# Patient Record
Sex: MALE | Race: WHITE | NOT HISPANIC OR LATINO | Employment: UNEMPLOYED | URBAN - METROPOLITAN AREA
[De-identification: names, ages, dates, MRNs, and addresses within clinical notes are randomized per-mention and may not be internally consistent; named-entity substitution may affect disease eponyms.]

---

## 2017-01-24 ENCOUNTER — HOSPITAL ENCOUNTER (INPATIENT)
Facility: HOSPITAL | Age: 47
LOS: 6 days | Discharge: HOME HEALTH | End: 2017-01-31
Attending: EMERGENCY MEDICINE | Admitting: FAMILY MEDICINE

## 2017-01-24 DIAGNOSIS — L03.211 FACIAL CELLULITIS: Primary | ICD-10-CM

## 2017-01-24 DIAGNOSIS — IMO0001 UNCONTROLLED TYPE 2 DIABETES MELLITUS WITHOUT COMPLICATION, WITHOUT LONG-TERM CURRENT USE OF INSULIN: ICD-10-CM

## 2017-01-24 LAB
ALBUMIN SERPL-MCNC: 3.9 G/DL (ref 3.5–5.2)
ALBUMIN/GLOB SERPL: 1 G/DL
ALP SERPL-CCNC: 108 U/L (ref 39–117)
ALT SERPL W P-5'-P-CCNC: 15 U/L (ref 1–41)
ANION GAP SERPL CALCULATED.3IONS-SCNC: 14.2 MMOL/L
AST SERPL-CCNC: 10 U/L (ref 1–40)
BASOPHILS # BLD AUTO: 0.04 10*3/MM3 (ref 0–0.2)
BASOPHILS NFR BLD AUTO: 0.2 % (ref 0–1.5)
BILIRUB SERPL-MCNC: 0.5 MG/DL (ref 0.1–1.2)
BUN BLD-MCNC: 10 MG/DL (ref 6–20)
BUN/CREAT SERPL: 11.4 (ref 7–25)
CALCIUM SPEC-SCNC: 9.9 MG/DL (ref 8.6–10.5)
CHLORIDE SERPL-SCNC: 95 MMOL/L (ref 98–107)
CO2 SERPL-SCNC: 28.8 MMOL/L (ref 22–29)
CREAT BLD-MCNC: 0.88 MG/DL (ref 0.76–1.27)
DEPRECATED RDW RBC AUTO: 42.9 FL (ref 37–54)
EOSINOPHIL # BLD AUTO: 0.16 10*3/MM3 (ref 0–0.7)
EOSINOPHIL NFR BLD AUTO: 0.9 % (ref 0.3–6.2)
ERYTHROCYTE [DISTWIDTH] IN BLOOD BY AUTOMATED COUNT: 13.1 % (ref 11.5–14.5)
GFR SERPL CREATININE-BSD FRML MDRD: 94 ML/MIN/1.73
GLOBULIN UR ELPH-MCNC: 3.8 GM/DL
GLUCOSE BLD-MCNC: 317 MG/DL (ref 65–99)
HCT VFR BLD AUTO: 48 % (ref 40.4–52.2)
HGB BLD-MCNC: 15.8 G/DL (ref 13.7–17.6)
IMM GRANULOCYTES # BLD: 0.06 10*3/MM3 (ref 0–0.03)
IMM GRANULOCYTES NFR BLD: 0.3 % (ref 0–0.5)
LYMPHOCYTES # BLD AUTO: 5.76 10*3/MM3 (ref 0.9–4.8)
LYMPHOCYTES NFR BLD AUTO: 31.5 % (ref 19.6–45.3)
MCH RBC QN AUTO: 29.6 PG (ref 27–32.7)
MCHC RBC AUTO-ENTMCNC: 32.9 G/DL (ref 32.6–36.4)
MCV RBC AUTO: 89.9 FL (ref 79.8–96.2)
MONOCYTES # BLD AUTO: 1.44 10*3/MM3 (ref 0.2–1.2)
MONOCYTES NFR BLD AUTO: 7.9 % (ref 5–12)
NEUTROPHILS # BLD AUTO: 10.82 10*3/MM3 (ref 1.9–8.1)
NEUTROPHILS NFR BLD AUTO: 59.2 % (ref 42.7–76)
PLATELET # BLD AUTO: 237 10*3/MM3 (ref 140–500)
PMV BLD AUTO: 11.4 FL (ref 6–12)
POTASSIUM BLD-SCNC: 4.8 MMOL/L (ref 3.5–5.2)
PROCALCITONIN SERPL-MCNC: 0.14 NG/ML (ref 0.1–0.25)
PROT SERPL-MCNC: 7.7 G/DL (ref 6–8.5)
RBC # BLD AUTO: 5.34 10*6/MM3 (ref 4.6–6)
SODIUM BLD-SCNC: 138 MMOL/L (ref 136–145)
WBC NRBC COR # BLD: 18.28 10*3/MM3 (ref 4.5–10.7)

## 2017-01-24 PROCEDURE — 83605 ASSAY OF LACTIC ACID: CPT | Performed by: PHYSICIAN ASSISTANT

## 2017-01-24 PROCEDURE — 36415 COLL VENOUS BLD VENIPUNCTURE: CPT | Performed by: FAMILY MEDICINE

## 2017-01-24 PROCEDURE — 84145 PROCALCITONIN (PCT): CPT | Performed by: PHYSICIAN ASSISTANT

## 2017-01-24 PROCEDURE — 83036 HEMOGLOBIN GLYCOSYLATED A1C: CPT | Performed by: FAMILY MEDICINE

## 2017-01-24 PROCEDURE — 25010000002 ONDANSETRON PER 1 MG: Performed by: PHYSICIAN ASSISTANT

## 2017-01-24 PROCEDURE — 25010000002 MORPHINE PER 10 MG: Performed by: EMERGENCY MEDICINE

## 2017-01-24 PROCEDURE — 80053 COMPREHEN METABOLIC PANEL: CPT | Performed by: EMERGENCY MEDICINE

## 2017-01-24 PROCEDURE — 85025 COMPLETE CBC W/AUTO DIFF WBC: CPT | Performed by: EMERGENCY MEDICINE

## 2017-01-24 PROCEDURE — 99284 EMERGENCY DEPT VISIT MOD MDM: CPT

## 2017-01-24 PROCEDURE — 25010000002 PIPERACILLIN SOD-TAZOBACTAM PER 1 G: Performed by: PHYSICIAN ASSISTANT

## 2017-01-24 PROCEDURE — 87040 BLOOD CULTURE FOR BACTERIA: CPT | Performed by: PHYSICIAN ASSISTANT

## 2017-01-24 RX ORDER — SODIUM CHLORIDE 0.9 % (FLUSH) 0.9 %
10 SYRINGE (ML) INJECTION AS NEEDED
Status: DISCONTINUED | OUTPATIENT
Start: 2017-01-24 | End: 2017-01-31 | Stop reason: HOSPADM

## 2017-01-24 RX ORDER — ONDANSETRON 2 MG/ML
4 INJECTION INTRAMUSCULAR; INTRAVENOUS ONCE
Status: COMPLETED | OUTPATIENT
Start: 2017-01-24 | End: 2017-01-24

## 2017-01-24 RX ADMIN — ONDANSETRON 4 MG: 2 INJECTION INTRAMUSCULAR; INTRAVENOUS at 23:42

## 2017-01-24 RX ADMIN — MORPHINE SULFATE 4 MG: 4 INJECTION, SOLUTION INTRAMUSCULAR; INTRAVENOUS at 23:41

## 2017-01-24 RX ADMIN — TAZOBACTAM SODIUM AND PIPERACILLIN SODIUM 4.5 G: 500; 4 INJECTION, SOLUTION INTRAVENOUS at 23:28

## 2017-01-25 ENCOUNTER — APPOINTMENT (OUTPATIENT)
Dept: GENERAL RADIOLOGY | Facility: HOSPITAL | Age: 47
End: 2017-01-25
Attending: FAMILY MEDICINE

## 2017-01-25 PROBLEM — L03.211 FACIAL CELLULITIS: Status: ACTIVE | Noted: 2017-01-25

## 2017-01-25 LAB
25(OH)D3 SERPL-MCNC: 17.9 NG/ML (ref 30–100)
ABO GROUP BLD: NORMAL
ALBUMIN SERPL-MCNC: 3.9 G/DL (ref 3.5–5.2)
ALBUMIN/GLOB SERPL: 1.1 G/DL
ALP SERPL-CCNC: 106 U/L (ref 39–117)
ALT SERPL W P-5'-P-CCNC: 12 U/L (ref 1–41)
AMPHET+METHAMPHET UR QL: NEGATIVE
ANION GAP SERPL CALCULATED.3IONS-SCNC: 12.4 MMOL/L
APTT PPP: 26.6 SECONDS (ref 22.7–35.4)
ARTERIAL PATENCY WRIST A: POSITIVE
AST SERPL-CCNC: 14 U/L (ref 1–40)
ATMOSPHERIC PRESS: 740.6 MMHG
BARBITURATES UR QL SCN: NEGATIVE
BASE EXCESS BLDA CALC-SCNC: 1.1 MMOL/L (ref 0–2)
BASOPHILS # BLD AUTO: 0.05 10*3/MM3 (ref 0–0.2)
BASOPHILS NFR BLD AUTO: 0.3 % (ref 0–1.5)
BDY SITE: ABNORMAL
BENZODIAZ UR QL SCN: NEGATIVE
BILIRUB SERPL-MCNC: 0.6 MG/DL (ref 0.1–1.2)
BILIRUB UR QL STRIP: NEGATIVE
BLD GP AB SCN SERPL QL: NEGATIVE
BUN BLD-MCNC: 7 MG/DL (ref 6–20)
BUN/CREAT SERPL: 8.6 (ref 7–25)
CALCIUM SPEC-SCNC: 9.3 MG/DL (ref 8.6–10.5)
CANNABINOIDS SERPL QL: NEGATIVE
CHLORIDE SERPL-SCNC: 94 MMOL/L (ref 98–107)
CHOLEST SERPL-MCNC: 149 MG/DL (ref 0–200)
CLARITY UR: CLEAR
CO2 SERPL-SCNC: 28.6 MMOL/L (ref 22–29)
COCAINE UR QL: NEGATIVE
COLOR UR: YELLOW
CREAT BLD-MCNC: 0.81 MG/DL (ref 0.76–1.27)
CRP SERPL-MCNC: 5 MG/DL (ref 0–0.5)
D DIMER PPP FEU-MCNC: 0.42 MCGFEU/ML (ref 0–0.49)
D-LACTATE SERPL-SCNC: 1.2 MMOL/L (ref 0.5–2)
D-LACTATE SERPL-SCNC: 1.3 MMOL/L (ref 0.5–2)
DEPRECATED RDW RBC AUTO: 41.3 FL (ref 37–54)
EOSINOPHIL # BLD AUTO: 0.21 10*3/MM3 (ref 0–0.7)
EOSINOPHIL NFR BLD AUTO: 1.1 % (ref 0.3–6.2)
ERYTHROCYTE [DISTWIDTH] IN BLOOD BY AUTOMATED COUNT: 12.9 % (ref 11.5–14.5)
ERYTHROCYTE [SEDIMENTATION RATE] IN BLOOD: 42 MM/HR (ref 0–15)
FIBRINOGEN PPP-MCNC: 652 MG/DL (ref 219–464)
GFR SERPL CREATININE-BSD FRML MDRD: 104 ML/MIN/1.73
GLOBULIN UR ELPH-MCNC: 3.6 GM/DL
GLUCOSE BLD-MCNC: 308 MG/DL (ref 65–99)
GLUCOSE BLDC GLUCOMTR-MCNC: 224 MG/DL (ref 70–130)
GLUCOSE BLDC GLUCOMTR-MCNC: 235 MG/DL (ref 70–130)
GLUCOSE BLDC GLUCOMTR-MCNC: 260 MG/DL (ref 70–130)
GLUCOSE BLDC GLUCOMTR-MCNC: 272 MG/DL (ref 70–130)
GLUCOSE BLDC GLUCOMTR-MCNC: 292 MG/DL (ref 70–130)
GLUCOSE UR STRIP-MCNC: ABNORMAL MG/DL
HBA1C MFR BLD: 12.2 % (ref 4.8–5.6)
HCO3 BLDA-SCNC: 26.1 MMOL/L (ref 22–28)
HCT VFR BLD AUTO: 45.7 % (ref 40.4–52.2)
HDLC SERPL-MCNC: 27 MG/DL (ref 40–60)
HGB BLD-MCNC: 15.1 G/DL (ref 13.7–17.6)
HGB UR QL STRIP.AUTO: NEGATIVE
IMM GRANULOCYTES # BLD: 0.07 10*3/MM3 (ref 0–0.03)
IMM GRANULOCYTES NFR BLD: 0.4 % (ref 0–0.5)
INR PPP: 1.1 (ref 0.9–1.1)
KETONES UR QL STRIP: ABNORMAL
LDH SERPL-CCNC: 137 U/L (ref 135–225)
LDLC SERPL CALC-MCNC: 72 MG/DL (ref 0–100)
LDLC/HDLC SERPL: 2.67 {RATIO}
LEUKOCYTE ESTERASE UR QL STRIP.AUTO: NEGATIVE
LYMPHOCYTES # BLD AUTO: 4.79 10*3/MM3 (ref 0.9–4.8)
LYMPHOCYTES NFR BLD AUTO: 25.8 % (ref 19.6–45.3)
MAGNESIUM SERPL-MCNC: 2 MG/DL (ref 1.6–2.6)
MCH RBC QN AUTO: 29.1 PG (ref 27–32.7)
MCHC RBC AUTO-ENTMCNC: 33 G/DL (ref 32.6–36.4)
MCV RBC AUTO: 88.1 FL (ref 79.8–96.2)
METHADONE UR QL SCN: NEGATIVE
MODALITY: ABNORMAL
MONOCYTES # BLD AUTO: 1.81 10*3/MM3 (ref 0.2–1.2)
MONOCYTES NFR BLD AUTO: 9.7 % (ref 5–12)
NEUTROPHILS # BLD AUTO: 11.65 10*3/MM3 (ref 1.9–8.1)
NEUTROPHILS NFR BLD AUTO: 62.7 % (ref 42.7–76)
NITRITE UR QL STRIP: NEGATIVE
NRBC BLD MANUAL-RTO: 0 /100 WBC (ref 0–0)
OPIATES UR QL: POSITIVE
OXYCODONE UR QL SCN: POSITIVE
PCO2 BLDA: 41.5 MM HG (ref 35–45)
PH BLDA: 7.41 PH UNITS (ref 7.35–7.45)
PH UR STRIP.AUTO: 7 [PH] (ref 5–8)
PLATELET # BLD AUTO: 213 10*3/MM3 (ref 140–500)
PMV BLD AUTO: 11.2 FL (ref 6–12)
PO2 BLDA: 69.9 MM HG (ref 80–100)
POTASSIUM BLD-SCNC: 4 MMOL/L (ref 3.5–5.2)
PROT SERPL-MCNC: 7.5 G/DL (ref 6–8.5)
PROT UR QL STRIP: NEGATIVE
PROTHROMBIN TIME: 13.8 SECONDS (ref 11.7–14.2)
RBC # BLD AUTO: 5.19 10*6/MM3 (ref 4.6–6)
RH BLD: POSITIVE
SAO2 % BLDCOA: 93.8 % (ref 92–99)
SODIUM BLD-SCNC: 135 MMOL/L (ref 136–145)
SP GR UR STRIP: 1.03 (ref 1–1.03)
TOTAL RATE: 20 BREATHS/MINUTE
TRIGL SERPL-MCNC: 250 MG/DL (ref 0–150)
URATE SERPL-MCNC: 4.1 MG/DL (ref 3.4–7)
UROBILINOGEN UR QL STRIP: ABNORMAL
VLDLC SERPL-MCNC: 50 MG/DL (ref 5–40)
WBC NRBC COR # BLD: 18.58 10*3/MM3 (ref 4.5–10.7)

## 2017-01-25 PROCEDURE — 80307 DRUG TEST PRSMV CHEM ANLYZR: CPT | Performed by: FAMILY MEDICINE

## 2017-01-25 PROCEDURE — 82306 VITAMIN D 25 HYDROXY: CPT | Performed by: FAMILY MEDICINE

## 2017-01-25 PROCEDURE — 84432 ASSAY OF THYROGLOBULIN: CPT | Performed by: FAMILY MEDICINE

## 2017-01-25 PROCEDURE — 63710000001 INSULIN ASPART PER 5 UNITS: Performed by: FAMILY MEDICINE

## 2017-01-25 PROCEDURE — 86900 BLOOD TYPING SEROLOGIC ABO: CPT

## 2017-01-25 PROCEDURE — 84479 ASSAY OF THYROID (T3 OR T4): CPT | Performed by: FAMILY MEDICINE

## 2017-01-25 PROCEDURE — 25010000002 HYDROMORPHONE PER 4 MG: Performed by: FAMILY MEDICINE

## 2017-01-25 PROCEDURE — 81003 URINALYSIS AUTO W/O SCOPE: CPT | Performed by: FAMILY MEDICINE

## 2017-01-25 PROCEDURE — 85379 FIBRIN DEGRADATION QUANT: CPT | Performed by: FAMILY MEDICINE

## 2017-01-25 PROCEDURE — 94799 UNLISTED PULMONARY SVC/PX: CPT

## 2017-01-25 PROCEDURE — 86901 BLOOD TYPING SEROLOGIC RH(D): CPT

## 2017-01-25 PROCEDURE — 82962 GLUCOSE BLOOD TEST: CPT

## 2017-01-25 PROCEDURE — 83735 ASSAY OF MAGNESIUM: CPT | Performed by: FAMILY MEDICINE

## 2017-01-25 PROCEDURE — 25010000002 VANCOMYCIN: Performed by: PHYSICIAN ASSISTANT

## 2017-01-25 PROCEDURE — 71020 HC CHEST PA AND LATERAL: CPT

## 2017-01-25 PROCEDURE — 80202 ASSAY OF VANCOMYCIN: CPT | Performed by: FAMILY MEDICINE

## 2017-01-25 PROCEDURE — 25010000002 VANCOMYCIN: Performed by: FAMILY MEDICINE

## 2017-01-25 PROCEDURE — 82803 BLOOD GASES ANY COMBINATION: CPT

## 2017-01-25 PROCEDURE — 25010000002 HYDROMORPHONE PER 4 MG: Performed by: EMERGENCY MEDICINE

## 2017-01-25 PROCEDURE — 84436 ASSAY OF TOTAL THYROXINE: CPT | Performed by: FAMILY MEDICINE

## 2017-01-25 PROCEDURE — 80053 COMPREHEN METABOLIC PANEL: CPT | Performed by: FAMILY MEDICINE

## 2017-01-25 PROCEDURE — 86140 C-REACTIVE PROTEIN: CPT | Performed by: FAMILY MEDICINE

## 2017-01-25 PROCEDURE — 82652 VIT D 1 25-DIHYDROXY: CPT | Performed by: FAMILY MEDICINE

## 2017-01-25 PROCEDURE — 85730 THROMBOPLASTIN TIME PARTIAL: CPT | Performed by: FAMILY MEDICINE

## 2017-01-25 PROCEDURE — 84260 ASSAY OF SEROTONIN: CPT | Performed by: FAMILY MEDICINE

## 2017-01-25 PROCEDURE — 83605 ASSAY OF LACTIC ACID: CPT | Performed by: FAMILY MEDICINE

## 2017-01-25 PROCEDURE — 84681 ASSAY OF C-PEPTIDE: CPT | Performed by: FAMILY MEDICINE

## 2017-01-25 PROCEDURE — 85652 RBC SED RATE AUTOMATED: CPT | Performed by: FAMILY MEDICINE

## 2017-01-25 PROCEDURE — 85384 FIBRINOGEN ACTIVITY: CPT | Performed by: FAMILY MEDICINE

## 2017-01-25 PROCEDURE — 25010000002 ENOXAPARIN PER 10 MG: Performed by: FAMILY MEDICINE

## 2017-01-25 PROCEDURE — 84550 ASSAY OF BLOOD/URIC ACID: CPT | Performed by: FAMILY MEDICINE

## 2017-01-25 PROCEDURE — 86850 RBC ANTIBODY SCREEN: CPT

## 2017-01-25 PROCEDURE — 83525 ASSAY OF INSULIN: CPT | Performed by: FAMILY MEDICINE

## 2017-01-25 PROCEDURE — 80061 LIPID PANEL: CPT | Performed by: FAMILY MEDICINE

## 2017-01-25 PROCEDURE — 83615 LACTATE (LD) (LDH) ENZYME: CPT | Performed by: FAMILY MEDICINE

## 2017-01-25 PROCEDURE — 85025 COMPLETE CBC W/AUTO DIFF WBC: CPT | Performed by: FAMILY MEDICINE

## 2017-01-25 PROCEDURE — 25010000002 PIPERACILLIN SOD-TAZOBACTAM PER 1 G: Performed by: FAMILY MEDICINE

## 2017-01-25 PROCEDURE — 84480 ASSAY TRIIODOTHYRONINE (T3): CPT | Performed by: FAMILY MEDICINE

## 2017-01-25 PROCEDURE — 85610 PROTHROMBIN TIME: CPT | Performed by: FAMILY MEDICINE

## 2017-01-25 PROCEDURE — 36600 WITHDRAWAL OF ARTERIAL BLOOD: CPT

## 2017-01-25 PROCEDURE — 25010000002 METHYLPREDNISOLONE PER 125 MG: Performed by: FAMILY MEDICINE

## 2017-01-25 PROCEDURE — 84443 ASSAY THYROID STIM HORMONE: CPT | Performed by: FAMILY MEDICINE

## 2017-01-25 PROCEDURE — 86376 MICROSOMAL ANTIBODY EACH: CPT | Performed by: FAMILY MEDICINE

## 2017-01-25 RX ORDER — TELMISARTAN AND HYDROCHLORTHIAZIDE 80; 12.5 MG/1; MG/1
1 TABLET ORAL DAILY
Status: DISCONTINUED | OUTPATIENT
Start: 2017-01-25 | End: 2017-01-26

## 2017-01-25 RX ORDER — HYDROMORPHONE HYDROCHLORIDE 1 MG/ML
0.5 INJECTION, SOLUTION INTRAMUSCULAR; INTRAVENOUS; SUBCUTANEOUS
Status: DISCONTINUED | OUTPATIENT
Start: 2017-01-25 | End: 2017-01-31

## 2017-01-25 RX ORDER — PIOGLITAZONEHYDROCHLORIDE 30 MG/1
30 TABLET ORAL DAILY
Status: DISCONTINUED | OUTPATIENT
Start: 2017-01-25 | End: 2017-01-31 | Stop reason: HOSPADM

## 2017-01-25 RX ORDER — NEBIVOLOL 5 MG/1
5 TABLET ORAL DAILY
Status: DISCONTINUED | OUTPATIENT
Start: 2017-01-25 | End: 2017-01-31 | Stop reason: HOSPADM

## 2017-01-25 RX ORDER — POTASSIUM CHLORIDE 1.5 G/1.77G
40 POWDER, FOR SOLUTION ORAL AS NEEDED
Status: DISCONTINUED | OUTPATIENT
Start: 2017-01-25 | End: 2017-01-31 | Stop reason: HOSPADM

## 2017-01-25 RX ORDER — ACETAMINOPHEN 325 MG/1
650 TABLET ORAL EVERY 4 HOURS PRN
Status: DISCONTINUED | OUTPATIENT
Start: 2017-01-25 | End: 2017-01-31 | Stop reason: HOSPADM

## 2017-01-25 RX ORDER — POTASSIUM CHLORIDE 7.45 MG/ML
10 INJECTION INTRAVENOUS
Status: DISCONTINUED | OUTPATIENT
Start: 2017-01-25 | End: 2017-01-31 | Stop reason: HOSPADM

## 2017-01-25 RX ORDER — METHYLPREDNISOLONE SODIUM SUCCINATE 125 MG/2ML
125 INJECTION, POWDER, LYOPHILIZED, FOR SOLUTION INTRAMUSCULAR; INTRAVENOUS EVERY 8 HOURS
Status: DISCONTINUED | OUTPATIENT
Start: 2017-01-25 | End: 2017-01-26

## 2017-01-25 RX ORDER — ATORVASTATIN CALCIUM 20 MG/1
20 TABLET, FILM COATED ORAL DAILY
Status: DISCONTINUED | OUTPATIENT
Start: 2017-01-25 | End: 2017-01-31 | Stop reason: HOSPADM

## 2017-01-25 RX ORDER — NICOTINE 21 MG/24HR
1 PATCH, TRANSDERMAL 24 HOURS TRANSDERMAL EVERY 24 HOURS
Status: DISCONTINUED | OUTPATIENT
Start: 2017-01-25 | End: 2017-01-31 | Stop reason: HOSPADM

## 2017-01-25 RX ORDER — HYDROCODONE BITARTRATE AND ACETAMINOPHEN 7.5; 325 MG/1; MG/1
1 TABLET ORAL EVERY 6 HOURS PRN
Status: DISCONTINUED | OUTPATIENT
Start: 2017-01-25 | End: 2017-01-31 | Stop reason: HOSPADM

## 2017-01-25 RX ORDER — CLONIDINE HYDROCHLORIDE 0.2 MG/1
0.2 TABLET ORAL 2 TIMES DAILY PRN
Status: DISCONTINUED | OUTPATIENT
Start: 2017-01-25 | End: 2017-01-31 | Stop reason: HOSPADM

## 2017-01-25 RX ORDER — SODIUM CHLORIDE 0.9 % (FLUSH) 0.9 %
1-10 SYRINGE (ML) INJECTION AS NEEDED
Status: DISCONTINUED | OUTPATIENT
Start: 2017-01-25 | End: 2017-01-31 | Stop reason: HOSPADM

## 2017-01-25 RX ORDER — NIFEDIPINE 90 MG/1
90 TABLET, EXTENDED RELEASE ORAL DAILY
Status: DISCONTINUED | OUTPATIENT
Start: 2017-01-25 | End: 2017-01-31 | Stop reason: HOSPADM

## 2017-01-25 RX ORDER — POTASSIUM CHLORIDE 750 MG/1
40 CAPSULE, EXTENDED RELEASE ORAL AS NEEDED
Status: DISCONTINUED | OUTPATIENT
Start: 2017-01-25 | End: 2017-01-31 | Stop reason: HOSPADM

## 2017-01-25 RX ORDER — IPRATROPIUM BROMIDE AND ALBUTEROL SULFATE 2.5; .5 MG/3ML; MG/3ML
3 SOLUTION RESPIRATORY (INHALATION) EVERY 4 HOURS PRN
Status: DISCONTINUED | OUTPATIENT
Start: 2017-01-25 | End: 2017-01-31 | Stop reason: HOSPADM

## 2017-01-25 RX ADMIN — HYDROCODONE BITARTRATE AND ACETAMINOPHEN 1 TABLET: 7.5; 325 TABLET ORAL at 20:11

## 2017-01-25 RX ADMIN — PIOGLITAZONE 30 MG: 30 TABLET ORAL at 11:40

## 2017-01-25 RX ADMIN — ATORVASTATIN CALCIUM 20 MG: 20 TABLET, FILM COATED ORAL at 08:45

## 2017-01-25 RX ADMIN — METFORMIN HYDROCHLORIDE 1000 MG: 1000 TABLET ORAL at 17:20

## 2017-01-25 RX ADMIN — TELMISARTAN AND HYDROCHLOROTHIAZIDE 1 TABLET: 80; 12.5 TABLET ORAL at 08:45

## 2017-01-25 RX ADMIN — TAZOBACTAM SODIUM AND PIPERACILLIN SODIUM 3.38 G: 375; 3 INJECTION, SOLUTION INTRAVENOUS at 11:36

## 2017-01-25 RX ADMIN — HYDROMORPHONE HYDROCHLORIDE 0.5 MG: 1 INJECTION, SOLUTION INTRAMUSCULAR; INTRAVENOUS; SUBCUTANEOUS at 17:20

## 2017-01-25 RX ADMIN — VANCOMYCIN HYDROCHLORIDE 1500 MG: 1 INJECTION, POWDER, LYOPHILIZED, FOR SOLUTION INTRAVENOUS at 09:01

## 2017-01-25 RX ADMIN — METFORMIN HYDROCHLORIDE 1000 MG: 1000 TABLET ORAL at 08:45

## 2017-01-25 RX ADMIN — HYDROMORPHONE HYDROCHLORIDE 0.5 MG: 1 INJECTION, SOLUTION INTRAMUSCULAR; INTRAVENOUS; SUBCUTANEOUS at 06:43

## 2017-01-25 RX ADMIN — HYDROMORPHONE HYDROCHLORIDE 0.5 MG: 1 INJECTION, SOLUTION INTRAMUSCULAR; INTRAVENOUS; SUBCUTANEOUS at 02:34

## 2017-01-25 RX ADMIN — HYDROMORPHONE HYDROCHLORIDE 0.5 MG: 1 INJECTION, SOLUTION INTRAMUSCULAR; INTRAVENOUS; SUBCUTANEOUS at 04:32

## 2017-01-25 RX ADMIN — NICOTINE 1 PATCH: 14 PATCH, EXTENDED RELEASE TRANSDERMAL at 01:32

## 2017-01-25 RX ADMIN — SITAGLIPTIN 100 MG: 100 TABLET, FILM COATED ORAL at 08:45

## 2017-01-25 RX ADMIN — ENOXAPARIN SODIUM 40 MG: 40 INJECTION SUBCUTANEOUS at 08:45

## 2017-01-25 RX ADMIN — INSULIN ASPART 7 UNITS: 100 INJECTION, SOLUTION INTRAVENOUS; SUBCUTANEOUS at 02:42

## 2017-01-25 RX ADMIN — INSULIN ASPART 7 UNITS: 100 INJECTION, SOLUTION INTRAVENOUS; SUBCUTANEOUS at 11:34

## 2017-01-25 RX ADMIN — METHYLPREDNISOLONE SODIUM SUCCINATE 125 MG: 125 INJECTION, POWDER, FOR SOLUTION INTRAMUSCULAR; INTRAVENOUS at 22:22

## 2017-01-25 RX ADMIN — HYDROMORPHONE HYDROCHLORIDE 0.5 MG: 1 INJECTION, SOLUTION INTRAMUSCULAR; INTRAVENOUS; SUBCUTANEOUS at 13:51

## 2017-01-25 RX ADMIN — HYDROMORPHONE HYDROCHLORIDE 0.5 MG: 1 INJECTION, SOLUTION INTRAMUSCULAR; INTRAVENOUS; SUBCUTANEOUS at 11:33

## 2017-01-25 RX ADMIN — HYDROMORPHONE HYDROCHLORIDE 0.5 MG: 1 INJECTION, SOLUTION INTRAMUSCULAR; INTRAVENOUS; SUBCUTANEOUS at 22:21

## 2017-01-25 RX ADMIN — NIFEDIPINE 90 MG: 90 TABLET, EXTENDED RELEASE ORAL at 08:45

## 2017-01-25 RX ADMIN — INSULIN ASPART 5 UNITS: 100 INJECTION, SOLUTION INTRAVENOUS; SUBCUTANEOUS at 08:46

## 2017-01-25 RX ADMIN — VANCOMYCIN HYDROCHLORIDE 1500 MG: 1 INJECTION, POWDER, LYOPHILIZED, FOR SOLUTION INTRAVENOUS at 19:25

## 2017-01-25 RX ADMIN — HYDROMORPHONE HYDROCHLORIDE 1 MG: 1 INJECTION, SOLUTION INTRAMUSCULAR; INTRAVENOUS; SUBCUTANEOUS at 01:02

## 2017-01-25 RX ADMIN — INSULIN ASPART 7 UNITS: 100 INJECTION, SOLUTION INTRAVENOUS; SUBCUTANEOUS at 17:20

## 2017-01-25 RX ADMIN — TAZOBACTAM SODIUM AND PIPERACILLIN SODIUM 3.38 G: 375; 3 INJECTION, SOLUTION INTRAVENOUS at 17:20

## 2017-01-25 RX ADMIN — VANCOMYCIN HYDROCHLORIDE 3000 MG: 1 INJECTION, POWDER, LYOPHILIZED, FOR SOLUTION INTRAVENOUS at 00:23

## 2017-01-25 RX ADMIN — HYDROMORPHONE HYDROCHLORIDE 0.5 MG: 1 INJECTION, SOLUTION INTRAMUSCULAR; INTRAVENOUS; SUBCUTANEOUS at 08:46

## 2017-01-25 RX ADMIN — INSULIN ASPART 5 UNITS: 100 INJECTION, SOLUTION INTRAVENOUS; SUBCUTANEOUS at 22:21

## 2017-01-25 RX ADMIN — HYDROMORPHONE HYDROCHLORIDE 0.5 MG: 1 INJECTION, SOLUTION INTRAMUSCULAR; INTRAVENOUS; SUBCUTANEOUS at 20:11

## 2017-01-26 ENCOUNTER — APPOINTMENT (OUTPATIENT)
Dept: CT IMAGING | Facility: HOSPITAL | Age: 47
End: 2017-01-26

## 2017-01-26 ENCOUNTER — APPOINTMENT (OUTPATIENT)
Dept: MRI IMAGING | Facility: HOSPITAL | Age: 47
End: 2017-01-26
Attending: FAMILY MEDICINE

## 2017-01-26 PROBLEM — J20.9 ACUTE BRONCHITIS: Status: ACTIVE | Noted: 2017-01-26

## 2017-01-26 PROBLEM — R09.02 HYPOXIA: Status: ACTIVE | Noted: 2017-01-26

## 2017-01-26 PROBLEM — R82.4 KETONURIA: Status: ACTIVE | Noted: 2017-01-26

## 2017-01-26 PROBLEM — L02.03: Status: ACTIVE | Noted: 2017-01-26

## 2017-01-26 PROBLEM — E11.00 TYPE 2 DIABETES MELLITUS WITH HYPEROSMOLARITY, UNCONTROLLED (HCC): Status: ACTIVE | Noted: 2017-01-26

## 2017-01-26 PROBLEM — E11.65 TYPE 2 DIABETES MELLITUS WITH HYPEROSMOLARITY, UNCONTROLLED (HCC): Status: ACTIVE | Noted: 2017-01-26

## 2017-01-26 PROBLEM — E78.1 HYPERTRIGLYCERIDEMIA: Status: ACTIVE | Noted: 2017-01-26

## 2017-01-26 PROBLEM — I10 SEVERE HYPERTENSION: Status: ACTIVE | Noted: 2017-01-26

## 2017-01-26 PROBLEM — R81 GLUCOSURIA: Status: ACTIVE | Noted: 2017-01-26

## 2017-01-26 PROBLEM — E87.1 HYPONATREMIA: Status: ACTIVE | Noted: 2017-01-26

## 2017-01-26 PROBLEM — E55.9 VITAMIN D DEFICIENCY: Status: ACTIVE | Noted: 2017-01-26

## 2017-01-26 LAB
ALBUMIN SERPL-MCNC: 3.5 G/DL (ref 3.5–5.2)
ALBUMIN SERPL-MCNC: 3.6 G/DL (ref 3.5–5.2)
ALBUMIN/GLOB SERPL: 0.9 G/DL
ALBUMIN/GLOB SERPL: 1 G/DL
ALP SERPL-CCNC: 96 U/L (ref 39–117)
ALP SERPL-CCNC: 99 U/L (ref 39–117)
ALT SERPL W P-5'-P-CCNC: 11 U/L (ref 1–41)
ALT SERPL W P-5'-P-CCNC: 12 U/L (ref 1–41)
ANION GAP SERPL CALCULATED.3IONS-SCNC: 18.2 MMOL/L
ANION GAP SERPL CALCULATED.3IONS-SCNC: 22.3 MMOL/L
AST SERPL-CCNC: 12 U/L (ref 1–40)
AST SERPL-CCNC: 12 U/L (ref 1–40)
BASOPHILS # BLD AUTO: 0.01 10*3/MM3 (ref 0–0.2)
BASOPHILS NFR BLD AUTO: 0.1 % (ref 0–1.5)
BILIRUB SERPL-MCNC: 0.5 MG/DL (ref 0.1–1.2)
BILIRUB SERPL-MCNC: 0.6 MG/DL (ref 0.1–1.2)
BUN BLD-MCNC: 14 MG/DL (ref 6–20)
BUN BLD-MCNC: 18 MG/DL (ref 6–20)
BUN/CREAT SERPL: 16.9 (ref 7–25)
BUN/CREAT SERPL: 20 (ref 7–25)
C PEPTIDE SERPL-MCNC: 2.4 NG/ML (ref 1.1–4.4)
CALCIUM SPEC-SCNC: 9.3 MG/DL (ref 8.6–10.5)
CALCIUM SPEC-SCNC: 9.6 MG/DL (ref 8.6–10.5)
CHLORIDE SERPL-SCNC: 89 MMOL/L (ref 98–107)
CHLORIDE SERPL-SCNC: 90 MMOL/L (ref 98–107)
CO2 SERPL-SCNC: 17.7 MMOL/L (ref 22–29)
CO2 SERPL-SCNC: 22.8 MMOL/L (ref 22–29)
CREAT BLD-MCNC: 0.83 MG/DL (ref 0.76–1.27)
CREAT BLD-MCNC: 0.9 MG/DL (ref 0.76–1.27)
DEPRECATED RDW RBC AUTO: 39.2 FL (ref 37–54)
EOSINOPHIL # BLD AUTO: 0 10*3/MM3 (ref 0–0.7)
EOSINOPHIL NFR BLD AUTO: 0 % (ref 0.3–6.2)
ERYTHROCYTE [DISTWIDTH] IN BLOOD BY AUTOMATED COUNT: 12.4 % (ref 11.5–14.5)
FT4I SERPL CALC-MCNC: 2.4 (ref 1.2–4.9)
GFR SERPL CREATININE-BSD FRML MDRD: 101 ML/MIN/1.73
GFR SERPL CREATININE-BSD FRML MDRD: 92 ML/MIN/1.73
GLOBULIN UR ELPH-MCNC: 3.5 GM/DL
GLOBULIN UR ELPH-MCNC: 3.8 GM/DL
GLUCOSE BLD-MCNC: 380 MG/DL (ref 65–99)
GLUCOSE BLD-MCNC: 386 MG/DL (ref 65–99)
GLUCOSE BLDC GLUCOMTR-MCNC: 303 MG/DL (ref 70–130)
GLUCOSE BLDC GLUCOMTR-MCNC: 319 MG/DL (ref 70–130)
GLUCOSE BLDC GLUCOMTR-MCNC: 362 MG/DL (ref 70–130)
GLUCOSE BLDC GLUCOMTR-MCNC: 365 MG/DL (ref 70–130)
HCT VFR BLD AUTO: 43.7 % (ref 40.4–52.2)
HGB BLD-MCNC: 15.1 G/DL (ref 13.7–17.6)
IMM GRANULOCYTES # BLD: 0.09 10*3/MM3 (ref 0–0.03)
IMM GRANULOCYTES NFR BLD: 0.5 % (ref 0–0.5)
LYMPHOCYTES # BLD AUTO: 1.84 10*3/MM3 (ref 0.9–4.8)
LYMPHOCYTES NFR BLD AUTO: 10 % (ref 19.6–45.3)
MAGNESIUM SERPL-MCNC: 2 MG/DL (ref 1.6–2.6)
MCH RBC QN AUTO: 29.6 PG (ref 27–32.7)
MCHC RBC AUTO-ENTMCNC: 34.6 G/DL (ref 32.6–36.4)
MCV RBC AUTO: 85.7 FL (ref 79.8–96.2)
MONOCYTES # BLD AUTO: 0.45 10*3/MM3 (ref 0.2–1.2)
MONOCYTES NFR BLD AUTO: 2.5 % (ref 5–12)
NEUTROPHILS # BLD AUTO: 15.94 10*3/MM3 (ref 1.9–8.1)
NEUTROPHILS NFR BLD AUTO: 86.9 % (ref 42.7–76)
PLATELET # BLD AUTO: 258 10*3/MM3 (ref 140–500)
PMV BLD AUTO: 11 FL (ref 6–12)
POTASSIUM BLD-SCNC: 4.2 MMOL/L (ref 3.5–5.2)
POTASSIUM BLD-SCNC: 4.4 MMOL/L (ref 3.5–5.2)
PROT SERPL-MCNC: 7.1 G/DL (ref 6–8.5)
PROT SERPL-MCNC: 7.3 G/DL (ref 6–8.5)
RBC # BLD AUTO: 5.1 10*6/MM3 (ref 4.6–6)
SODIUM BLD-SCNC: 130 MMOL/L (ref 136–145)
SODIUM BLD-SCNC: 130 MMOL/L (ref 136–145)
T3 SERPL-MCNC: 162 NG/DL (ref 71–180)
T3RU NFR SERPL: 27 % (ref 24–39)
T4 SERPL-MCNC: 8.9 UG/DL (ref 4.5–12)
THYROPEROXIDASE AB SERPL-ACNC: 7 IU/ML (ref 0–34)
TSH SERPL-ACNC: 2.05 UIU/ML (ref 0.45–4.5)
VANCOMYCIN TROUGH SERPL-MCNC: 9.3 MCG/ML (ref 5–20)
WBC NRBC COR # BLD: 18.33 10*3/MM3 (ref 4.5–10.7)

## 2017-01-26 PROCEDURE — 0 GADOBENATE DIMEGLUMINE 529 MG/ML SOLUTION: Performed by: FAMILY MEDICINE

## 2017-01-26 PROCEDURE — 0 IOPAMIDOL 61 % SOLUTION: Performed by: FAMILY MEDICINE

## 2017-01-26 PROCEDURE — 70487 CT MAXILLOFACIAL W/DYE: CPT

## 2017-01-26 PROCEDURE — 25010000002 ENOXAPARIN PER 10 MG: Performed by: FAMILY MEDICINE

## 2017-01-26 PROCEDURE — 25010000002 PIPERACILLIN SOD-TAZOBACTAM PER 1 G: Performed by: FAMILY MEDICINE

## 2017-01-26 PROCEDURE — 87205 SMEAR GRAM STAIN: CPT | Performed by: SURGERY

## 2017-01-26 PROCEDURE — 63710000001 INSULIN ASPART PER 5 UNITS: Performed by: FAMILY MEDICINE

## 2017-01-26 PROCEDURE — 87075 CULTR BACTERIA EXCEPT BLOOD: CPT | Performed by: SURGERY

## 2017-01-26 PROCEDURE — 87147 CULTURE TYPE IMMUNOLOGIC: CPT | Performed by: SURGERY

## 2017-01-26 PROCEDURE — 25010000002 LEVOFLOXACIN PER 250 MG: Performed by: FAMILY MEDICINE

## 2017-01-26 PROCEDURE — 80053 COMPREHEN METABOLIC PANEL: CPT | Performed by: FAMILY MEDICINE

## 2017-01-26 PROCEDURE — 83735 ASSAY OF MAGNESIUM: CPT | Performed by: FAMILY MEDICINE

## 2017-01-26 PROCEDURE — 70480 CT ORBIT/EAR/FOSSA W/O DYE: CPT

## 2017-01-26 PROCEDURE — 87070 CULTURE OTHR SPECIMN AEROBIC: CPT | Performed by: SURGERY

## 2017-01-26 PROCEDURE — 87186 SC STD MICRODIL/AGAR DIL: CPT | Performed by: SURGERY

## 2017-01-26 PROCEDURE — 70553 MRI BRAIN STEM W/O & W/DYE: CPT

## 2017-01-26 PROCEDURE — 70470 CT HEAD/BRAIN W/O & W/DYE: CPT

## 2017-01-26 PROCEDURE — 25010000002 METHYLPREDNISOLONE PER 125 MG: Performed by: FAMILY MEDICINE

## 2017-01-26 PROCEDURE — A9577 INJ MULTIHANCE: HCPCS | Performed by: FAMILY MEDICINE

## 2017-01-26 PROCEDURE — 82962 GLUCOSE BLOOD TEST: CPT

## 2017-01-26 PROCEDURE — 25010000002 HYDROMORPHONE PER 4 MG: Performed by: FAMILY MEDICINE

## 2017-01-26 PROCEDURE — 85025 COMPLETE CBC W/AUTO DIFF WBC: CPT | Performed by: FAMILY MEDICINE

## 2017-01-26 PROCEDURE — 25010000002 METHYLPREDNISOLONE PER 40 MG: Performed by: FAMILY MEDICINE

## 2017-01-26 PROCEDURE — 25010000002 VANCOMYCIN: Performed by: FAMILY MEDICINE

## 2017-01-26 RX ORDER — ZOLPIDEM TARTRATE 10 MG/1
10 TABLET ORAL NIGHTLY PRN
Status: DISCONTINUED | OUTPATIENT
Start: 2017-01-26 | End: 2017-01-31 | Stop reason: HOSPADM

## 2017-01-26 RX ORDER — METHYLPREDNISOLONE SODIUM SUCCINATE 40 MG/ML
40 INJECTION, POWDER, LYOPHILIZED, FOR SOLUTION INTRAMUSCULAR; INTRAVENOUS EVERY 8 HOURS
Status: DISCONTINUED | OUTPATIENT
Start: 2017-01-26 | End: 2017-01-27

## 2017-01-26 RX ORDER — CIPROFLOXACIN 2 MG/ML
400 INJECTION, SOLUTION INTRAVENOUS EVERY 12 HOURS
Status: DISCONTINUED | OUTPATIENT
Start: 2017-01-26 | End: 2017-01-26 | Stop reason: CLARIF

## 2017-01-26 RX ORDER — LEVOFLOXACIN 5 MG/ML
750 INJECTION, SOLUTION INTRAVENOUS EVERY 24 HOURS
Status: DISCONTINUED | OUTPATIENT
Start: 2017-01-26 | End: 2017-01-28

## 2017-01-26 RX ORDER — IRBESARTAN AND HYDROCHLOROTHIAZIDE 150; 12.5 MG/1; MG/1
1 TABLET, FILM COATED ORAL
Status: DISCONTINUED | OUTPATIENT
Start: 2017-01-26 | End: 2017-01-28

## 2017-01-26 RX ORDER — ERGOCALCIFEROL 1.25 MG/1
50000 CAPSULE ORAL
Status: DISCONTINUED | OUTPATIENT
Start: 2017-01-26 | End: 2017-01-31 | Stop reason: HOSPADM

## 2017-01-26 RX ORDER — PANTOPRAZOLE SODIUM 40 MG/1
40 TABLET, DELAYED RELEASE ORAL
Status: DISCONTINUED | OUTPATIENT
Start: 2017-01-26 | End: 2017-01-31 | Stop reason: HOSPADM

## 2017-01-26 RX ORDER — TELMISARTAN AND HYDROCHLORTHIAZIDE 80; 12.5 MG/1; MG/1
0.5 TABLET ORAL DAILY
Status: DISCONTINUED | OUTPATIENT
Start: 2017-01-26 | End: 2017-01-26

## 2017-01-26 RX ORDER — METHYLPREDNISOLONE SODIUM SUCCINATE 125 MG/2ML
60 INJECTION, POWDER, LYOPHILIZED, FOR SOLUTION INTRAMUSCULAR; INTRAVENOUS EVERY 8 HOURS
Status: DISCONTINUED | OUTPATIENT
Start: 2017-01-26 | End: 2017-01-26

## 2017-01-26 RX ADMIN — HYDROCODONE BITARTRATE AND ACETAMINOPHEN 1 TABLET: 7.5; 325 TABLET ORAL at 08:46

## 2017-01-26 RX ADMIN — METHYLPREDNISOLONE SODIUM SUCCINATE 60 MG: 125 INJECTION, POWDER, FOR SOLUTION INTRAMUSCULAR; INTRAVENOUS at 05:53

## 2017-01-26 RX ADMIN — HYDROMORPHONE HYDROCHLORIDE 0.5 MG: 1 INJECTION, SOLUTION INTRAMUSCULAR; INTRAVENOUS; SUBCUTANEOUS at 02:23

## 2017-01-26 RX ADMIN — IOPAMIDOL 85 ML: 612 INJECTION, SOLUTION INTRAVENOUS at 09:10

## 2017-01-26 RX ADMIN — VANCOMYCIN HYDROCHLORIDE 1750 MG: 1 INJECTION, POWDER, LYOPHILIZED, FOR SOLUTION INTRAVENOUS at 17:42

## 2017-01-26 RX ADMIN — METHYLPREDNISOLONE SODIUM SUCCINATE 40 MG: 40 INJECTION, POWDER, FOR SOLUTION INTRAMUSCULAR; INTRAVENOUS at 20:27

## 2017-01-26 RX ADMIN — HYDROCODONE BITARTRATE AND ACETAMINOPHEN 1 TABLET: 7.5; 325 TABLET ORAL at 02:23

## 2017-01-26 RX ADMIN — GADOBENATE DIMEGLUMINE 20 ML: 529 INJECTION, SOLUTION INTRAVENOUS at 14:10

## 2017-01-26 RX ADMIN — ERGOCALCIFEROL 50000 UNITS: 1.25 CAPSULE ORAL at 08:47

## 2017-01-26 RX ADMIN — VANCOMYCIN HYDROCHLORIDE 1500 MG: 1 INJECTION, POWDER, LYOPHILIZED, FOR SOLUTION INTRAVENOUS at 09:35

## 2017-01-26 RX ADMIN — SITAGLIPTIN 100 MG: 100 TABLET, FILM COATED ORAL at 08:47

## 2017-01-26 RX ADMIN — VANCOMYCIN HYDROCHLORIDE 1500 MG: 1 INJECTION, POWDER, LYOPHILIZED, FOR SOLUTION INTRAVENOUS at 04:10

## 2017-01-26 RX ADMIN — HYDROMORPHONE HYDROCHLORIDE 0.5 MG: 1 INJECTION, SOLUTION INTRAMUSCULAR; INTRAVENOUS; SUBCUTANEOUS at 06:54

## 2017-01-26 RX ADMIN — TAZOBACTAM SODIUM AND PIPERACILLIN SODIUM 3.38 G: 375; 3 INJECTION, SOLUTION INTRAVENOUS at 02:14

## 2017-01-26 RX ADMIN — IRBESARTAN AND HYDROCHLOROTHIAZIDE 1 TABLET: 150; 12.5 TABLET ORAL at 08:47

## 2017-01-26 RX ADMIN — NIFEDIPINE 90 MG: 90 TABLET, EXTENDED RELEASE ORAL at 08:48

## 2017-01-26 RX ADMIN — INSULIN ASPART 13 UNITS: 100 INJECTION, SOLUTION INTRAVENOUS; SUBCUTANEOUS at 17:09

## 2017-01-26 RX ADMIN — INSULIN ASPART 13 UNITS: 100 INJECTION, SOLUTION INTRAVENOUS; SUBCUTANEOUS at 13:18

## 2017-01-26 RX ADMIN — PIOGLITAZONE 30 MG: 30 TABLET ORAL at 08:47

## 2017-01-26 RX ADMIN — METFORMIN HYDROCHLORIDE 1000 MG: 1000 TABLET ORAL at 17:09

## 2017-01-26 RX ADMIN — PANTOPRAZOLE SODIUM 40 MG: 40 TABLET, DELAYED RELEASE ORAL at 05:53

## 2017-01-26 RX ADMIN — NEBIVOLOL HYDROCHLORIDE 5 MG: 5 TABLET ORAL at 08:47

## 2017-01-26 RX ADMIN — ATORVASTATIN CALCIUM 20 MG: 20 TABLET, FILM COATED ORAL at 08:47

## 2017-01-26 RX ADMIN — HYDROMORPHONE HYDROCHLORIDE 0.5 MG: 1 INJECTION, SOLUTION INTRAMUSCULAR; INTRAVENOUS; SUBCUTANEOUS at 00:18

## 2017-01-26 RX ADMIN — TAZOBACTAM SODIUM AND PIPERACILLIN SODIUM 3.38 G: 375; 3 INJECTION, SOLUTION INTRAVENOUS at 17:42

## 2017-01-26 RX ADMIN — LEVOFLOXACIN 750 MG: 750 INJECTION, SOLUTION INTRAVENOUS at 02:14

## 2017-01-26 RX ADMIN — INSULIN ASPART 10 UNITS: 100 INJECTION, SOLUTION INTRAVENOUS; SUBCUTANEOUS at 08:46

## 2017-01-26 RX ADMIN — TAZOBACTAM SODIUM AND PIPERACILLIN SODIUM 3.38 G: 375; 3 INJECTION, SOLUTION INTRAVENOUS at 09:35

## 2017-01-26 RX ADMIN — METFORMIN HYDROCHLORIDE 1000 MG: 1000 TABLET ORAL at 08:47

## 2017-01-26 RX ADMIN — ENOXAPARIN SODIUM 40 MG: 40 INJECTION SUBCUTANEOUS at 08:47

## 2017-01-26 RX ADMIN — NICOTINE 1 PATCH: 14 PATCH, EXTENDED RELEASE TRANSDERMAL at 00:23

## 2017-01-26 RX ADMIN — METHYLPREDNISOLONE SODIUM SUCCINATE 60 MG: 125 INJECTION, POWDER, FOR SOLUTION INTRAMUSCULAR; INTRAVENOUS at 13:17

## 2017-01-27 ENCOUNTER — TRANSCRIBE ORDERS (OUTPATIENT)
Dept: ADMINISTRATIVE | Facility: HOSPITAL | Age: 47
End: 2017-01-27

## 2017-01-27 ENCOUNTER — HOSPITAL ENCOUNTER (OUTPATIENT)
Dept: INFUSION THERAPY | Facility: HOSPITAL | Age: 47
Discharge: HOME | End: 2017-01-27
Attending: SURGERY | Admitting: SURGERY

## 2017-01-27 VITALS
HEART RATE: 73 BPM | DIASTOLIC BLOOD PRESSURE: 63 MMHG | OXYGEN SATURATION: 97 % | SYSTOLIC BLOOD PRESSURE: 114 MMHG | TEMPERATURE: 97.9 F | RESPIRATION RATE: 18 BRPM

## 2017-01-27 DIAGNOSIS — L02.01 ABSCESS OF TEMPLE REGION: Primary | ICD-10-CM

## 2017-01-27 LAB
1,25(OH)2D3 SERPL-MCNC: 76.5 PG/ML (ref 19.9–79.3)
ALBUMIN SERPL-MCNC: 3.3 G/DL (ref 3.5–5.2)
ALBUMIN/GLOB SERPL: 1.1 G/DL
ALP SERPL-CCNC: 77 U/L (ref 39–117)
ALT SERPL W P-5'-P-CCNC: 9 U/L (ref 1–41)
ANION GAP SERPL CALCULATED.3IONS-SCNC: 18.3 MMOL/L
AST SERPL-CCNC: 11 U/L (ref 1–40)
BASOPHILS # BLD AUTO: 0 10*3/MM3 (ref 0–0.2)
BASOPHILS NFR BLD AUTO: 0 % (ref 0–1.5)
BILIRUB SERPL-MCNC: 0.4 MG/DL (ref 0.1–1.2)
BUN BLD-MCNC: 24 MG/DL (ref 6–20)
BUN/CREAT SERPL: 30 (ref 7–25)
CALCIUM SPEC-SCNC: 9.2 MG/DL (ref 8.6–10.5)
CHLORIDE SERPL-SCNC: 95 MMOL/L (ref 98–107)
CO2 SERPL-SCNC: 18.7 MMOL/L (ref 22–29)
CREAT BLD-MCNC: 0.8 MG/DL (ref 0.76–1.27)
DEPRECATED RDW RBC AUTO: 39.7 FL (ref 37–54)
EOSINOPHIL # BLD AUTO: 0 10*3/MM3 (ref 0–0.7)
EOSINOPHIL NFR BLD AUTO: 0 % (ref 0.3–6.2)
ERYTHROCYTE [DISTWIDTH] IN BLOOD BY AUTOMATED COUNT: 12.6 % (ref 11.5–14.5)
GFR SERPL CREATININE-BSD FRML MDRD: 105 ML/MIN/1.73
GLOBULIN UR ELPH-MCNC: 3.1 GM/DL
GLUCOSE BLD-MCNC: 367 MG/DL (ref 65–99)
GLUCOSE BLDC GLUCOMTR-MCNC: 290 MG/DL (ref 70–130)
GLUCOSE BLDC GLUCOMTR-MCNC: 299 MG/DL (ref 70–130)
GLUCOSE BLDC GLUCOMTR-MCNC: 389 MG/DL (ref 70–130)
GLUCOSE BLDC GLUCOMTR-MCNC: 390 MG/DL (ref 70–130)
HCT VFR BLD AUTO: 39.6 % (ref 40.4–52.2)
HGB BLD-MCNC: 13.6 G/DL (ref 13.7–17.6)
IMM GRANULOCYTES # BLD: 0.13 10*3/MM3 (ref 0–0.03)
IMM GRANULOCYTES NFR BLD: 0.6 % (ref 0–0.5)
INSULIN SERPL-ACNC: 18 UIU/ML
LYMPHOCYTES # BLD AUTO: 2.45 10*3/MM3 (ref 0.9–4.8)
LYMPHOCYTES NFR BLD AUTO: 11.5 % (ref 19.6–45.3)
MAGNESIUM SERPL-MCNC: 2 MG/DL (ref 1.6–2.6)
MCH RBC QN AUTO: 29.4 PG (ref 27–32.7)
MCHC RBC AUTO-ENTMCNC: 34.3 G/DL (ref 32.6–36.4)
MCV RBC AUTO: 85.5 FL (ref 79.8–96.2)
MONOCYTES # BLD AUTO: 1.45 10*3/MM3 (ref 0.2–1.2)
MONOCYTES NFR BLD AUTO: 6.8 % (ref 5–12)
NEUTROPHILS # BLD AUTO: 17.23 10*3/MM3 (ref 1.9–8.1)
NEUTROPHILS NFR BLD AUTO: 81.1 % (ref 42.7–76)
PLATELET # BLD AUTO: 274 10*3/MM3 (ref 140–500)
PMV BLD AUTO: 11.2 FL (ref 6–12)
POTASSIUM BLD-SCNC: 4.7 MMOL/L (ref 3.5–5.2)
PROT SERPL-MCNC: 6.4 G/DL (ref 6–8.5)
RBC # BLD AUTO: 4.63 10*6/MM3 (ref 4.6–6)
SEROTONIN PLAS-MCNC: 31 NG/ML (ref 21–321)
SODIUM BLD-SCNC: 132 MMOL/L (ref 136–145)
WBC NRBC COR # BLD: 21.26 10*3/MM3 (ref 4.5–10.7)

## 2017-01-27 PROCEDURE — 80053 COMPREHEN METABOLIC PANEL: CPT | Performed by: FAMILY MEDICINE

## 2017-01-27 PROCEDURE — 25010000002 HYDROMORPHONE PER 4 MG: Performed by: FAMILY MEDICINE

## 2017-01-27 PROCEDURE — 83735 ASSAY OF MAGNESIUM: CPT | Performed by: FAMILY MEDICINE

## 2017-01-27 PROCEDURE — 63710000001 INSULIN ASPART PER 5 UNITS: Performed by: FAMILY MEDICINE

## 2017-01-27 PROCEDURE — 25010000002 LEVOFLOXACIN PER 250 MG: Performed by: FAMILY MEDICINE

## 2017-01-27 PROCEDURE — 85025 COMPLETE CBC W/AUTO DIFF WBC: CPT | Performed by: FAMILY MEDICINE

## 2017-01-27 PROCEDURE — 25010000002 VANCOMYCIN: Performed by: FAMILY MEDICINE

## 2017-01-27 PROCEDURE — 25010000002 METHYLPREDNISOLONE PER 40 MG: Performed by: FAMILY MEDICINE

## 2017-01-27 PROCEDURE — 82962 GLUCOSE BLOOD TEST: CPT

## 2017-01-27 PROCEDURE — 96401 CHEMO ANTI-NEOPL SQ/IM: CPT

## 2017-01-27 PROCEDURE — 25010000002 ENOXAPARIN PER 10 MG: Performed by: FAMILY MEDICINE

## 2017-01-27 PROCEDURE — 25010000002 PIPERACILLIN SOD-TAZOBACTAM PER 1 G: Performed by: FAMILY MEDICINE

## 2017-01-27 RX ORDER — HYOSCYAMINE SULFATE 16 OZ
SOLUTION MISCELLANEOUS EVERY 12 HOURS SCHEDULED
Status: DISCONTINUED | OUTPATIENT
Start: 2017-01-27 | End: 2017-01-31 | Stop reason: HOSPADM

## 2017-01-27 RX ORDER — LIDOCAINE HYDROCHLORIDE 10 MG/ML
20 INJECTION, SOLUTION INFILTRATION; PERINEURAL ONCE
Status: DISCONTINUED | OUTPATIENT
Start: 2017-01-27 | End: 2017-01-29 | Stop reason: HOSPADM

## 2017-01-27 RX ORDER — LIDOCAINE HYDROCHLORIDE AND EPINEPHRINE 10; 10 MG/ML; UG/ML
20 INJECTION, SOLUTION INFILTRATION; PERINEURAL ONCE
Status: COMPLETED | OUTPATIENT
Start: 2017-01-27 | End: 2017-01-27

## 2017-01-27 RX ORDER — HYOSCYAMINE SULFATE 16 OZ
SOLUTION MISCELLANEOUS
Status: DISCONTINUED | OUTPATIENT
Start: 2017-01-27 | End: 2017-01-29 | Stop reason: HOSPADM

## 2017-01-27 RX ADMIN — HYOSCYAMINE SULFATE 473 ML: 16 SOLUTION at 16:38

## 2017-01-27 RX ADMIN — TAZOBACTAM SODIUM AND PIPERACILLIN SODIUM 3.38 G: 375; 3 INJECTION, SOLUTION INTRAVENOUS at 02:17

## 2017-01-27 RX ADMIN — VANCOMYCIN HYDROCHLORIDE 1750 MG: 1 INJECTION, POWDER, LYOPHILIZED, FOR SOLUTION INTRAVENOUS at 00:31

## 2017-01-27 RX ADMIN — PIOGLITAZONE 30 MG: 30 TABLET ORAL at 09:20

## 2017-01-27 RX ADMIN — HYDROMORPHONE HYDROCHLORIDE 0.5 MG: 1 INJECTION, SOLUTION INTRAMUSCULAR; INTRAVENOUS; SUBCUTANEOUS at 21:55

## 2017-01-27 RX ADMIN — METHYLPREDNISOLONE SODIUM SUCCINATE 40 MG: 40 INJECTION, POWDER, FOR SOLUTION INTRAMUSCULAR; INTRAVENOUS at 13:43

## 2017-01-27 RX ADMIN — TAZOBACTAM SODIUM AND PIPERACILLIN SODIUM 3.38 G: 375; 3 INJECTION, SOLUTION INTRAVENOUS at 17:56

## 2017-01-27 RX ADMIN — METFORMIN HYDROCHLORIDE 1000 MG: 1000 TABLET ORAL at 09:20

## 2017-01-27 RX ADMIN — VANCOMYCIN HYDROCHLORIDE 1750 MG: 1 INJECTION, POWDER, LYOPHILIZED, FOR SOLUTION INTRAVENOUS at 09:20

## 2017-01-27 RX ADMIN — INSULIN ASPART 13 UNITS: 100 INJECTION, SOLUTION INTRAVENOUS; SUBCUTANEOUS at 12:06

## 2017-01-27 RX ADMIN — INSULIN ASPART 7 UNITS: 100 INJECTION, SOLUTION INTRAVENOUS; SUBCUTANEOUS at 17:56

## 2017-01-27 RX ADMIN — ENOXAPARIN SODIUM 40 MG: 40 INJECTION SUBCUTANEOUS at 09:20

## 2017-01-27 RX ADMIN — METFORMIN HYDROCHLORIDE 1000 MG: 1000 TABLET ORAL at 17:56

## 2017-01-27 RX ADMIN — METHYLPREDNISOLONE SODIUM SUCCINATE 40 MG: 40 INJECTION, POWDER, FOR SOLUTION INTRAMUSCULAR; INTRAVENOUS at 05:34

## 2017-01-27 RX ADMIN — INSULIN ASPART 13 UNITS: 100 INJECTION, SOLUTION INTRAVENOUS; SUBCUTANEOUS at 21:29

## 2017-01-27 RX ADMIN — SITAGLIPTIN 100 MG: 100 TABLET, FILM COATED ORAL at 09:20

## 2017-01-27 RX ADMIN — VANCOMYCIN HYDROCHLORIDE 1750 MG: 1 INJECTION, POWDER, LYOPHILIZED, FOR SOLUTION INTRAVENOUS at 17:56

## 2017-01-27 RX ADMIN — ATORVASTATIN CALCIUM 20 MG: 20 TABLET, FILM COATED ORAL at 09:20

## 2017-01-27 RX ADMIN — INSULIN ASPART 7 UNITS: 100 INJECTION, SOLUTION INTRAVENOUS; SUBCUTANEOUS at 09:25

## 2017-01-27 RX ADMIN — HYDROMORPHONE HYDROCHLORIDE 0.5 MG: 1 INJECTION, SOLUTION INTRAMUSCULAR; INTRAVENOUS; SUBCUTANEOUS at 18:04

## 2017-01-27 RX ADMIN — TAZOBACTAM SODIUM AND PIPERACILLIN SODIUM 3.38 G: 375; 3 INJECTION, SOLUTION INTRAVENOUS at 09:30

## 2017-01-27 RX ADMIN — SODIUM BICARBONATE 50 MEQ: 84 INJECTION, SOLUTION INTRAVENOUS at 16:23

## 2017-01-27 RX ADMIN — HYDROCODONE BITARTRATE AND ACETAMINOPHEN 1 TABLET: 7.5; 325 TABLET ORAL at 18:52

## 2017-01-27 RX ADMIN — LIDOCAINE HYDROCHLORIDE,EPINEPHRINE BITARTRATE 20 ML: 10; .01 INJECTION, SOLUTION INFILTRATION; PERINEURAL at 16:22

## 2017-01-27 RX ADMIN — LEVOFLOXACIN 750 MG: 750 INJECTION, SOLUTION INTRAVENOUS at 03:36

## 2017-01-27 RX ADMIN — HYOSCYAMINE SULFATE: 16 SOLUTION at 21:00

## 2017-01-27 NOTE — PROGRESS NOTES
Medications added to the MAR by the ACU nurse were used for procedure by provider.  See provider procedure dictation for details regarding provider's method and timing of administration as well as dosages. Patient tolerated procedure well. Transport took patient at 1718 per wheelchair to return to room 719. Report called to P nurse.

## 2017-01-28 LAB
ALBUMIN SERPL-MCNC: 3.2 G/DL (ref 3.5–5.2)
ALBUMIN/GLOB SERPL: 1 G/DL
ALP SERPL-CCNC: 76 U/L (ref 39–117)
ALT SERPL W P-5'-P-CCNC: 10 U/L (ref 1–41)
ANION GAP SERPL CALCULATED.3IONS-SCNC: 15.2 MMOL/L
AST SERPL-CCNC: 7 U/L (ref 1–40)
BACTERIA SPEC AEROBE CULT: ABNORMAL
BACTERIA SPEC AEROBE CULT: ABNORMAL
BASOPHILS # BLD AUTO: 0.01 10*3/MM3 (ref 0–0.2)
BASOPHILS NFR BLD AUTO: 0.1 % (ref 0–1.5)
BILIRUB SERPL-MCNC: 0.2 MG/DL (ref 0.1–1.2)
BUN BLD-MCNC: 22 MG/DL (ref 6–20)
BUN/CREAT SERPL: 25.6 (ref 7–25)
CALCIUM SPEC-SCNC: 9.2 MG/DL (ref 8.6–10.5)
CHLORIDE SERPL-SCNC: 96 MMOL/L (ref 98–107)
CO2 SERPL-SCNC: 23.8 MMOL/L (ref 22–29)
CREAT BLD-MCNC: 0.86 MG/DL (ref 0.76–1.27)
DEPRECATED RDW RBC AUTO: 41.2 FL (ref 37–54)
EOSINOPHIL # BLD AUTO: 0.01 10*3/MM3 (ref 0–0.7)
EOSINOPHIL NFR BLD AUTO: 0.1 % (ref 0.3–6.2)
ERYTHROCYTE [DISTWIDTH] IN BLOOD BY AUTOMATED COUNT: 13 % (ref 11.5–14.5)
GFR SERPL CREATININE-BSD FRML MDRD: 97 ML/MIN/1.73
GLOBULIN UR ELPH-MCNC: 3.1 GM/DL
GLUCOSE BLD-MCNC: 339 MG/DL (ref 65–99)
GLUCOSE BLDC GLUCOMTR-MCNC: 263 MG/DL (ref 70–130)
GLUCOSE BLDC GLUCOMTR-MCNC: 277 MG/DL (ref 70–130)
GLUCOSE BLDC GLUCOMTR-MCNC: 277 MG/DL (ref 70–130)
GLUCOSE BLDC GLUCOMTR-MCNC: 310 MG/DL (ref 70–130)
GRAM STN SPEC: ABNORMAL
HCT VFR BLD AUTO: 39.8 % (ref 40.4–52.2)
HGB BLD-MCNC: 13.7 G/DL (ref 13.7–17.6)
IMM GRANULOCYTES # BLD: 0.17 10*3/MM3 (ref 0–0.03)
IMM GRANULOCYTES NFR BLD: 0.9 % (ref 0–0.5)
LYMPHOCYTES # BLD AUTO: 3.15 10*3/MM3 (ref 0.9–4.8)
LYMPHOCYTES NFR BLD AUTO: 16.3 % (ref 19.6–45.3)
MAGNESIUM SERPL-MCNC: 2 MG/DL (ref 1.6–2.6)
MCH RBC QN AUTO: 29.8 PG (ref 27–32.7)
MCHC RBC AUTO-ENTMCNC: 34.4 G/DL (ref 32.6–36.4)
MCV RBC AUTO: 86.7 FL (ref 79.8–96.2)
MONOCYTES # BLD AUTO: 1.9 10*3/MM3 (ref 0.2–1.2)
MONOCYTES NFR BLD AUTO: 9.8 % (ref 5–12)
NEUTROPHILS # BLD AUTO: 14.12 10*3/MM3 (ref 1.9–8.1)
NEUTROPHILS NFR BLD AUTO: 72.8 % (ref 42.7–76)
PLATELET # BLD AUTO: 286 10*3/MM3 (ref 140–500)
PMV BLD AUTO: 11.2 FL (ref 6–12)
POTASSIUM BLD-SCNC: 4.2 MMOL/L (ref 3.5–5.2)
PROT SERPL-MCNC: 6.3 G/DL (ref 6–8.5)
RBC # BLD AUTO: 4.59 10*6/MM3 (ref 4.6–6)
SODIUM BLD-SCNC: 135 MMOL/L (ref 136–145)
VANCOMYCIN TROUGH SERPL-MCNC: 15 MCG/ML (ref 5–20)
WBC NRBC COR # BLD: 19.36 10*3/MM3 (ref 4.5–10.7)

## 2017-01-28 PROCEDURE — 25010000002 VANCOMYCIN: Performed by: FAMILY MEDICINE

## 2017-01-28 PROCEDURE — 25010000002 HYDROMORPHONE PER 4 MG: Performed by: FAMILY MEDICINE

## 2017-01-28 PROCEDURE — 25810000003 DEXTROSE-NACL PER 500 ML: Performed by: FAMILY MEDICINE

## 2017-01-28 PROCEDURE — 83735 ASSAY OF MAGNESIUM: CPT | Performed by: FAMILY MEDICINE

## 2017-01-28 PROCEDURE — 82962 GLUCOSE BLOOD TEST: CPT

## 2017-01-28 PROCEDURE — 25010000002 LEVOFLOXACIN PER 250 MG: Performed by: FAMILY MEDICINE

## 2017-01-28 PROCEDURE — 25010000002 PIPERACILLIN SOD-TAZOBACTAM PER 1 G: Performed by: FAMILY MEDICINE

## 2017-01-28 PROCEDURE — 63710000001 INSULIN DETEMER PER 5 UNITS: Performed by: FAMILY MEDICINE

## 2017-01-28 PROCEDURE — 25010000002 CIPROFLOXACIN PER 200 MG: Performed by: FAMILY MEDICINE

## 2017-01-28 PROCEDURE — 80202 ASSAY OF VANCOMYCIN: CPT | Performed by: FAMILY MEDICINE

## 2017-01-28 PROCEDURE — 25010000002 ENOXAPARIN PER 10 MG: Performed by: FAMILY MEDICINE

## 2017-01-28 PROCEDURE — 80053 COMPREHEN METABOLIC PANEL: CPT | Performed by: FAMILY MEDICINE

## 2017-01-28 PROCEDURE — 85025 COMPLETE CBC W/AUTO DIFF WBC: CPT | Performed by: FAMILY MEDICINE

## 2017-01-28 PROCEDURE — 63710000001 INSULIN ASPART PER 5 UNITS: Performed by: FAMILY MEDICINE

## 2017-01-28 RX ORDER — DEXTROSE AND SODIUM CHLORIDE 5; .9 G/100ML; G/100ML
100 INJECTION, SOLUTION INTRAVENOUS CONTINUOUS
Status: DISCONTINUED | OUTPATIENT
Start: 2017-01-28 | End: 2017-01-31 | Stop reason: HOSPADM

## 2017-01-28 RX ORDER — IRBESARTAN 150 MG/1
150 TABLET ORAL
Status: DISCONTINUED | OUTPATIENT
Start: 2017-01-29 | End: 2017-01-31 | Stop reason: HOSPADM

## 2017-01-28 RX ORDER — CIPROFLOXACIN 2 MG/ML
400 INJECTION, SOLUTION INTRAVENOUS EVERY 12 HOURS
Status: DISCONTINUED | OUTPATIENT
Start: 2017-01-28 | End: 2017-01-31

## 2017-01-28 RX ORDER — MAGNESIUM HYDROXIDE 1200 MG/15ML
1000 LIQUID ORAL DAILY
Status: DISCONTINUED | OUTPATIENT
Start: 2017-01-28 | End: 2017-01-31 | Stop reason: HOSPADM

## 2017-01-28 RX ADMIN — HYDROMORPHONE HYDROCHLORIDE 0.5 MG: 1 INJECTION, SOLUTION INTRAMUSCULAR; INTRAVENOUS; SUBCUTANEOUS at 21:36

## 2017-01-28 RX ADMIN — PANTOPRAZOLE SODIUM 40 MG: 40 TABLET, DELAYED RELEASE ORAL at 06:03

## 2017-01-28 RX ADMIN — IRBESARTAN AND HYDROCHLOROTHIAZIDE 1 TABLET: 150; 12.5 TABLET ORAL at 09:31

## 2017-01-28 RX ADMIN — VANCOMYCIN HYDROCHLORIDE 1750 MG: 1 INJECTION, POWDER, LYOPHILIZED, FOR SOLUTION INTRAVENOUS at 02:28

## 2017-01-28 RX ADMIN — ATORVASTATIN CALCIUM 20 MG: 20 TABLET, FILM COATED ORAL at 09:31

## 2017-01-28 RX ADMIN — INSULIN ASPART 7 UNITS: 100 INJECTION, SOLUTION INTRAVENOUS; SUBCUTANEOUS at 17:07

## 2017-01-28 RX ADMIN — HYDROMORPHONE HYDROCHLORIDE 0.5 MG: 1 INJECTION, SOLUTION INTRAMUSCULAR; INTRAVENOUS; SUBCUTANEOUS at 18:43

## 2017-01-28 RX ADMIN — PIOGLITAZONE 30 MG: 30 TABLET ORAL at 09:31

## 2017-01-28 RX ADMIN — HYDROCODONE BITARTRATE AND ACETAMINOPHEN 1 TABLET: 7.5; 325 TABLET ORAL at 02:08

## 2017-01-28 RX ADMIN — VANCOMYCIN HYDROCHLORIDE 1750 MG: 1 INJECTION, POWDER, LYOPHILIZED, FOR SOLUTION INTRAVENOUS at 18:21

## 2017-01-28 RX ADMIN — VANCOMYCIN HYDROCHLORIDE 1750 MG: 1 INJECTION, POWDER, LYOPHILIZED, FOR SOLUTION INTRAVENOUS at 09:32

## 2017-01-28 RX ADMIN — INSULIN ASPART 10 UNITS: 100 INJECTION, SOLUTION INTRAVENOUS; SUBCUTANEOUS at 13:36

## 2017-01-28 RX ADMIN — HYOSCYAMINE SULFATE 473 ML: 16 SOLUTION at 09:32

## 2017-01-28 RX ADMIN — METFORMIN HYDROCHLORIDE 1000 MG: 1000 TABLET ORAL at 09:31

## 2017-01-28 RX ADMIN — HYOSCYAMINE SULFATE 473 ML: 16 SOLUTION at 18:23

## 2017-01-28 RX ADMIN — TAZOBACTAM SODIUM AND PIPERACILLIN SODIUM 3.38 G: 375; 3 INJECTION, SOLUTION INTRAVENOUS at 18:27

## 2017-01-28 RX ADMIN — TAZOBACTAM SODIUM AND PIPERACILLIN SODIUM 3.38 G: 375; 3 INJECTION, SOLUTION INTRAVENOUS at 09:33

## 2017-01-28 RX ADMIN — INSULIN DETEMIR 32 UNITS: 100 INJECTION, SOLUTION SUBCUTANEOUS at 21:56

## 2017-01-28 RX ADMIN — DEXTROSE AND SODIUM CHLORIDE 100 ML/HR: 5; 900 INJECTION, SOLUTION INTRAVENOUS at 17:30

## 2017-01-28 RX ADMIN — SITAGLIPTIN 100 MG: 100 TABLET, FILM COATED ORAL at 09:31

## 2017-01-28 RX ADMIN — NIFEDIPINE 90 MG: 90 TABLET, EXTENDED RELEASE ORAL at 09:31

## 2017-01-28 RX ADMIN — TAZOBACTAM SODIUM AND PIPERACILLIN SODIUM 3.38 G: 375; 3 INJECTION, SOLUTION INTRAVENOUS at 02:08

## 2017-01-28 RX ADMIN — HYDROMORPHONE HYDROCHLORIDE 0.5 MG: 1 INJECTION, SOLUTION INTRAMUSCULAR; INTRAVENOUS; SUBCUTANEOUS at 01:20

## 2017-01-28 RX ADMIN — ENOXAPARIN SODIUM 40 MG: 40 INJECTION SUBCUTANEOUS at 09:30

## 2017-01-28 RX ADMIN — Medication 10 ML: at 18:27

## 2017-01-28 RX ADMIN — LEVOFLOXACIN 750 MG: 750 INJECTION, SOLUTION INTRAVENOUS at 06:03

## 2017-01-28 RX ADMIN — METFORMIN HYDROCHLORIDE 1000 MG: 1000 TABLET ORAL at 17:07

## 2017-01-28 RX ADMIN — INSULIN ASPART 7 UNITS: 100 INJECTION, SOLUTION INTRAVENOUS; SUBCUTANEOUS at 21:55

## 2017-01-28 RX ADMIN — NEBIVOLOL HYDROCHLORIDE 5 MG: 5 TABLET ORAL at 09:31

## 2017-01-28 RX ADMIN — CIPROFLOXACIN 400 MG: 2 INJECTION, SOLUTION INTRAVENOUS at 16:25

## 2017-01-28 RX ADMIN — HYDROMORPHONE HYDROCHLORIDE 0.5 MG: 1 INJECTION, SOLUTION INTRAMUSCULAR; INTRAVENOUS; SUBCUTANEOUS at 09:58

## 2017-01-29 LAB
ALBUMIN SERPL-MCNC: 2.9 G/DL (ref 3.5–5.2)
ALBUMIN/GLOB SERPL: 1 G/DL
ALP SERPL-CCNC: 63 U/L (ref 39–117)
ALT SERPL W P-5'-P-CCNC: 17 U/L (ref 1–41)
ANION GAP SERPL CALCULATED.3IONS-SCNC: 12.1 MMOL/L
AST SERPL-CCNC: 20 U/L (ref 1–40)
BASOPHILS # BLD AUTO: 0.02 10*3/MM3 (ref 0–0.2)
BASOPHILS NFR BLD AUTO: 0.2 % (ref 0–1.5)
BILIRUB SERPL-MCNC: 0.2 MG/DL (ref 0.1–1.2)
BUN BLD-MCNC: 14 MG/DL (ref 6–20)
BUN/CREAT SERPL: 17.7 (ref 7–25)
CALCIUM SPEC-SCNC: 8.6 MG/DL (ref 8.6–10.5)
CHLORIDE SERPL-SCNC: 98 MMOL/L (ref 98–107)
CO2 SERPL-SCNC: 25.9 MMOL/L (ref 22–29)
CREAT BLD-MCNC: 0.79 MG/DL (ref 0.76–1.27)
DEPRECATED RDW RBC AUTO: 42.8 FL (ref 37–54)
EOSINOPHIL # BLD AUTO: 0.04 10*3/MM3 (ref 0–0.7)
EOSINOPHIL NFR BLD AUTO: 0.3 % (ref 0.3–6.2)
ERYTHROCYTE [DISTWIDTH] IN BLOOD BY AUTOMATED COUNT: 13.1 % (ref 11.5–14.5)
GFR SERPL CREATININE-BSD FRML MDRD: 107 ML/MIN/1.73
GLOBULIN UR ELPH-MCNC: 2.9 GM/DL
GLUCOSE BLD-MCNC: 305 MG/DL (ref 65–99)
GLUCOSE BLDC GLUCOMTR-MCNC: 265 MG/DL (ref 70–130)
GLUCOSE BLDC GLUCOMTR-MCNC: 269 MG/DL (ref 70–130)
GLUCOSE BLDC GLUCOMTR-MCNC: 286 MG/DL (ref 70–130)
GLUCOSE BLDC GLUCOMTR-MCNC: 291 MG/DL (ref 70–130)
HCT VFR BLD AUTO: 40.7 % (ref 40.4–52.2)
HGB BLD-MCNC: 13.2 G/DL (ref 13.7–17.6)
IMM GRANULOCYTES # BLD: 0.11 10*3/MM3 (ref 0–0.03)
IMM GRANULOCYTES NFR BLD: 0.9 % (ref 0–0.5)
LYMPHOCYTES # BLD AUTO: 6.06 10*3/MM3 (ref 0.9–4.8)
LYMPHOCYTES NFR BLD AUTO: 48.6 % (ref 19.6–45.3)
MAGNESIUM SERPL-MCNC: 1.7 MG/DL (ref 1.6–2.6)
MCH RBC QN AUTO: 29.1 PG (ref 27–32.7)
MCHC RBC AUTO-ENTMCNC: 32.4 G/DL (ref 32.6–36.4)
MCV RBC AUTO: 89.6 FL (ref 79.8–96.2)
MONOCYTES # BLD AUTO: 1.29 10*3/MM3 (ref 0.2–1.2)
MONOCYTES NFR BLD AUTO: 10.3 % (ref 5–12)
NEUTROPHILS # BLD AUTO: 4.96 10*3/MM3 (ref 1.9–8.1)
NEUTROPHILS NFR BLD AUTO: 39.7 % (ref 42.7–76)
PLATELET # BLD AUTO: 270 10*3/MM3 (ref 140–500)
PMV BLD AUTO: 10.8 FL (ref 6–12)
POTASSIUM BLD-SCNC: 3.9 MMOL/L (ref 3.5–5.2)
PROT SERPL-MCNC: 5.8 G/DL (ref 6–8.5)
RBC # BLD AUTO: 4.54 10*6/MM3 (ref 4.6–6)
SODIUM BLD-SCNC: 136 MMOL/L (ref 136–145)
WBC NRBC COR # BLD: 12.48 10*3/MM3 (ref 4.5–10.7)

## 2017-01-29 PROCEDURE — 63710000001 INSULIN ASPART PER 5 UNITS: Performed by: FAMILY MEDICINE

## 2017-01-29 PROCEDURE — 25010000002 PIPERACILLIN SOD-TAZOBACTAM PER 1 G: Performed by: FAMILY MEDICINE

## 2017-01-29 PROCEDURE — 25010000002 VANCOMYCIN: Performed by: FAMILY MEDICINE

## 2017-01-29 PROCEDURE — 25010000002 ENOXAPARIN PER 10 MG: Performed by: FAMILY MEDICINE

## 2017-01-29 PROCEDURE — 63710000001 INSULIN DETEMER PER 5 UNITS: Performed by: FAMILY MEDICINE

## 2017-01-29 PROCEDURE — 82962 GLUCOSE BLOOD TEST: CPT

## 2017-01-29 PROCEDURE — 83735 ASSAY OF MAGNESIUM: CPT | Performed by: FAMILY MEDICINE

## 2017-01-29 PROCEDURE — 80053 COMPREHEN METABOLIC PANEL: CPT | Performed by: FAMILY MEDICINE

## 2017-01-29 PROCEDURE — 25010000002 CIPROFLOXACIN PER 200 MG: Performed by: FAMILY MEDICINE

## 2017-01-29 PROCEDURE — 25010000002 HYDROMORPHONE PER 4 MG: Performed by: FAMILY MEDICINE

## 2017-01-29 PROCEDURE — 94799 UNLISTED PULMONARY SVC/PX: CPT

## 2017-01-29 PROCEDURE — 85025 COMPLETE CBC W/AUTO DIFF WBC: CPT | Performed by: FAMILY MEDICINE

## 2017-01-29 RX ADMIN — VANCOMYCIN HYDROCHLORIDE 1750 MG: 1 INJECTION, POWDER, LYOPHILIZED, FOR SOLUTION INTRAVENOUS at 01:15

## 2017-01-29 RX ADMIN — ENOXAPARIN SODIUM 40 MG: 40 INJECTION SUBCUTANEOUS at 09:22

## 2017-01-29 RX ADMIN — INSULIN ASPART 7 UNITS: 100 INJECTION, SOLUTION INTRAVENOUS; SUBCUTANEOUS at 06:36

## 2017-01-29 RX ADMIN — INSULIN ASPART 7 UNITS: 100 INJECTION, SOLUTION INTRAVENOUS; SUBCUTANEOUS at 21:35

## 2017-01-29 RX ADMIN — IRBESARTAN 150 MG: 150 TABLET ORAL at 09:22

## 2017-01-29 RX ADMIN — TAZOBACTAM SODIUM AND PIPERACILLIN SODIUM 3.38 G: 375; 3 INJECTION, SOLUTION INTRAVENOUS at 17:19

## 2017-01-29 RX ADMIN — VANCOMYCIN HYDROCHLORIDE 1750 MG: 1 INJECTION, POWDER, LYOPHILIZED, FOR SOLUTION INTRAVENOUS at 09:22

## 2017-01-29 RX ADMIN — HYOSCYAMINE SULFATE 473 ML: 16 SOLUTION at 09:23

## 2017-01-29 RX ADMIN — INSULIN DETEMIR 32 UNITS: 100 INJECTION, SOLUTION SUBCUTANEOUS at 09:21

## 2017-01-29 RX ADMIN — VANCOMYCIN HYDROCHLORIDE 1750 MG: 1 INJECTION, POWDER, LYOPHILIZED, FOR SOLUTION INTRAVENOUS at 17:19

## 2017-01-29 RX ADMIN — INSULIN ASPART 7 UNITS: 100 INJECTION, SOLUTION INTRAVENOUS; SUBCUTANEOUS at 13:28

## 2017-01-29 RX ADMIN — ATORVASTATIN CALCIUM 20 MG: 20 TABLET, FILM COATED ORAL at 09:20

## 2017-01-29 RX ADMIN — CIPROFLOXACIN 400 MG: 2 INJECTION, SOLUTION INTRAVENOUS at 15:18

## 2017-01-29 RX ADMIN — SODIUM CHLORIDE 1000 ML: 900 IRRIGANT IRRIGATION at 14:11

## 2017-01-29 RX ADMIN — HYDROMORPHONE HYDROCHLORIDE 0.5 MG: 1 INJECTION, SOLUTION INTRAMUSCULAR; INTRAVENOUS; SUBCUTANEOUS at 14:12

## 2017-01-29 RX ADMIN — INSULIN ASPART 7 UNITS: 100 INJECTION, SOLUTION INTRAVENOUS; SUBCUTANEOUS at 17:19

## 2017-01-29 RX ADMIN — SITAGLIPTIN 100 MG: 100 TABLET, FILM COATED ORAL at 09:22

## 2017-01-29 RX ADMIN — PANTOPRAZOLE SODIUM 40 MG: 40 TABLET, DELAYED RELEASE ORAL at 06:36

## 2017-01-29 RX ADMIN — PIOGLITAZONE 30 MG: 30 TABLET ORAL at 09:22

## 2017-01-29 RX ADMIN — CIPROFLOXACIN 400 MG: 2 INJECTION, SOLUTION INTRAVENOUS at 04:50

## 2017-01-29 RX ADMIN — TAZOBACTAM SODIUM AND PIPERACILLIN SODIUM 3.38 G: 375; 3 INJECTION, SOLUTION INTRAVENOUS at 09:22

## 2017-01-29 RX ADMIN — Medication 10 ML: at 09:23

## 2017-01-29 RX ADMIN — HYDROMORPHONE HYDROCHLORIDE 0.5 MG: 1 INJECTION, SOLUTION INTRAMUSCULAR; INTRAVENOUS; SUBCUTANEOUS at 17:18

## 2017-01-29 RX ADMIN — NEBIVOLOL HYDROCHLORIDE 5 MG: 5 TABLET ORAL at 09:20

## 2017-01-29 RX ADMIN — TAZOBACTAM SODIUM AND PIPERACILLIN SODIUM 3.38 G: 375; 3 INJECTION, SOLUTION INTRAVENOUS at 01:28

## 2017-01-29 RX ADMIN — NIFEDIPINE 90 MG: 90 TABLET, EXTENDED RELEASE ORAL at 09:20

## 2017-01-29 RX ADMIN — METFORMIN HYDROCHLORIDE 1000 MG: 1000 TABLET ORAL at 09:20

## 2017-01-29 RX ADMIN — METFORMIN HYDROCHLORIDE 1000 MG: 1000 TABLET ORAL at 17:20

## 2017-01-29 RX ADMIN — HYDROMORPHONE HYDROCHLORIDE 0.5 MG: 1 INJECTION, SOLUTION INTRAMUSCULAR; INTRAVENOUS; SUBCUTANEOUS at 01:27

## 2017-01-30 LAB
ALBUMIN SERPL-MCNC: 3 G/DL (ref 3.5–5.2)
ALBUMIN/GLOB SERPL: 1.1 G/DL
ALP SERPL-CCNC: 64 U/L (ref 39–117)
ALT SERPL W P-5'-P-CCNC: 23 U/L (ref 1–41)
ANION GAP SERPL CALCULATED.3IONS-SCNC: 16.3 MMOL/L
AST SERPL-CCNC: 16 U/L (ref 1–40)
BACTERIA SPEC AEROBE CULT: NORMAL
BASOPHILS # BLD AUTO: 0.03 10*3/MM3 (ref 0–0.2)
BASOPHILS NFR BLD AUTO: 0.2 % (ref 0–1.5)
BILIRUB SERPL-MCNC: 0.2 MG/DL (ref 0.1–1.2)
BUN BLD-MCNC: 7 MG/DL (ref 6–20)
BUN/CREAT SERPL: 9.2 (ref 7–25)
CALCIUM SPEC-SCNC: 8.8 MG/DL (ref 8.6–10.5)
CHLORIDE SERPL-SCNC: 97 MMOL/L (ref 98–107)
CO2 SERPL-SCNC: 25.7 MMOL/L (ref 22–29)
CREAT BLD-MCNC: 0.76 MG/DL (ref 0.76–1.27)
DEPRECATED RDW RBC AUTO: 42.6 FL (ref 37–54)
EOSINOPHIL # BLD AUTO: 0.09 10*3/MM3 (ref 0–0.7)
EOSINOPHIL NFR BLD AUTO: 0.6 % (ref 0.3–6.2)
ERYTHROCYTE [DISTWIDTH] IN BLOOD BY AUTOMATED COUNT: 12.8 % (ref 11.5–14.5)
GFR SERPL CREATININE-BSD FRML MDRD: 111 ML/MIN/1.73
GLOBULIN UR ELPH-MCNC: 2.8 GM/DL
GLUCOSE BLD-MCNC: 240 MG/DL (ref 65–99)
GLUCOSE BLDC GLUCOMTR-MCNC: 220 MG/DL (ref 70–130)
GLUCOSE BLDC GLUCOMTR-MCNC: 229 MG/DL (ref 70–130)
GLUCOSE BLDC GLUCOMTR-MCNC: 241 MG/DL (ref 70–130)
GLUCOSE BLDC GLUCOMTR-MCNC: 258 MG/DL (ref 70–130)
HCT VFR BLD AUTO: 42.8 % (ref 40.4–52.2)
HGB BLD-MCNC: 13.7 G/DL (ref 13.7–17.6)
IMM GRANULOCYTES # BLD: 0.19 10*3/MM3 (ref 0–0.03)
IMM GRANULOCYTES NFR BLD: 1.3 % (ref 0–0.5)
LYMPHOCYTES # BLD AUTO: 5.75 10*3/MM3 (ref 0.9–4.8)
LYMPHOCYTES NFR BLD AUTO: 40.7 % (ref 19.6–45.3)
MAGNESIUM SERPL-MCNC: 1.7 MG/DL (ref 1.6–2.6)
MCH RBC QN AUTO: 28.9 PG (ref 27–32.7)
MCHC RBC AUTO-ENTMCNC: 32 G/DL (ref 32.6–36.4)
MCV RBC AUTO: 90.3 FL (ref 79.8–96.2)
MONOCYTES # BLD AUTO: 1.2 10*3/MM3 (ref 0.2–1.2)
MONOCYTES NFR BLD AUTO: 8.5 % (ref 5–12)
NEUTROPHILS # BLD AUTO: 6.88 10*3/MM3 (ref 1.9–8.1)
NEUTROPHILS NFR BLD AUTO: 48.7 % (ref 42.7–76)
PLATELET # BLD AUTO: 276 10*3/MM3 (ref 140–500)
PMV BLD AUTO: 10.6 FL (ref 6–12)
POTASSIUM BLD-SCNC: 3.7 MMOL/L (ref 3.5–5.2)
PROT SERPL-MCNC: 5.8 G/DL (ref 6–8.5)
RBC # BLD AUTO: 4.74 10*6/MM3 (ref 4.6–6)
SODIUM BLD-SCNC: 139 MMOL/L (ref 136–145)
THYROGLOBULIN (TG-RIA): 10 NG/ML
WBC NRBC COR # BLD: 14.14 10*3/MM3 (ref 4.5–10.7)

## 2017-01-30 PROCEDURE — 82962 GLUCOSE BLOOD TEST: CPT

## 2017-01-30 PROCEDURE — 25010000002 PIPERACILLIN SOD-TAZOBACTAM PER 1 G: Performed by: FAMILY MEDICINE

## 2017-01-30 PROCEDURE — 83735 ASSAY OF MAGNESIUM: CPT | Performed by: FAMILY MEDICINE

## 2017-01-30 PROCEDURE — 25010000002 ENOXAPARIN PER 10 MG: Performed by: FAMILY MEDICINE

## 2017-01-30 PROCEDURE — 25010000002 VANCOMYCIN: Performed by: FAMILY MEDICINE

## 2017-01-30 PROCEDURE — 25010000002 CIPROFLOXACIN PER 200 MG: Performed by: FAMILY MEDICINE

## 2017-01-30 PROCEDURE — 85025 COMPLETE CBC W/AUTO DIFF WBC: CPT | Performed by: FAMILY MEDICINE

## 2017-01-30 PROCEDURE — 80053 COMPREHEN METABOLIC PANEL: CPT | Performed by: FAMILY MEDICINE

## 2017-01-30 PROCEDURE — 25010000002 HYDROMORPHONE PER 4 MG: Performed by: FAMILY MEDICINE

## 2017-01-30 PROCEDURE — 63710000001 INSULIN ASPART PER 5 UNITS: Performed by: FAMILY MEDICINE

## 2017-01-30 RX ADMIN — ENOXAPARIN SODIUM 40 MG: 40 INJECTION SUBCUTANEOUS at 08:42

## 2017-01-30 RX ADMIN — TAZOBACTAM SODIUM AND PIPERACILLIN SODIUM 3.38 G: 375; 3 INJECTION, SOLUTION INTRAVENOUS at 10:32

## 2017-01-30 RX ADMIN — CIPROFLOXACIN 400 MG: 2 INJECTION, SOLUTION INTRAVENOUS at 16:04

## 2017-01-30 RX ADMIN — METFORMIN HYDROCHLORIDE 1000 MG: 1000 TABLET ORAL at 08:42

## 2017-01-30 RX ADMIN — TAZOBACTAM SODIUM AND PIPERACILLIN SODIUM 3.38 G: 375; 3 INJECTION, SOLUTION INTRAVENOUS at 17:20

## 2017-01-30 RX ADMIN — INSULIN ASPART 7 UNITS: 100 INJECTION, SOLUTION INTRAVENOUS; SUBCUTANEOUS at 12:06

## 2017-01-30 RX ADMIN — VANCOMYCIN HYDROCHLORIDE 1750 MG: 1 INJECTION, POWDER, LYOPHILIZED, FOR SOLUTION INTRAVENOUS at 01:37

## 2017-01-30 RX ADMIN — PANTOPRAZOLE SODIUM 40 MG: 40 TABLET, DELAYED RELEASE ORAL at 05:23

## 2017-01-30 RX ADMIN — HYDROMORPHONE HYDROCHLORIDE 0.5 MG: 1 INJECTION, SOLUTION INTRAMUSCULAR; INTRAVENOUS; SUBCUTANEOUS at 10:49

## 2017-01-30 RX ADMIN — VANCOMYCIN HYDROCHLORIDE 1750 MG: 1 INJECTION, POWDER, LYOPHILIZED, FOR SOLUTION INTRAVENOUS at 17:20

## 2017-01-30 RX ADMIN — VANCOMYCIN HYDROCHLORIDE 1750 MG: 1 INJECTION, POWDER, LYOPHILIZED, FOR SOLUTION INTRAVENOUS at 10:32

## 2017-01-30 RX ADMIN — TAZOBACTAM SODIUM AND PIPERACILLIN SODIUM 3.38 G: 375; 3 INJECTION, SOLUTION INTRAVENOUS at 02:30

## 2017-01-30 RX ADMIN — SITAGLIPTIN 100 MG: 100 TABLET, FILM COATED ORAL at 08:42

## 2017-01-30 RX ADMIN — INSULIN ASPART 5 UNITS: 100 INJECTION, SOLUTION INTRAVENOUS; SUBCUTANEOUS at 17:19

## 2017-01-30 RX ADMIN — METFORMIN HYDROCHLORIDE 1000 MG: 1000 TABLET ORAL at 18:03

## 2017-01-30 RX ADMIN — PIOGLITAZONE 30 MG: 30 TABLET ORAL at 08:42

## 2017-01-30 RX ADMIN — NIFEDIPINE 90 MG: 90 TABLET, EXTENDED RELEASE ORAL at 08:42

## 2017-01-30 RX ADMIN — INSULIN ASPART 5 UNITS: 100 INJECTION, SOLUTION INTRAVENOUS; SUBCUTANEOUS at 08:42

## 2017-01-30 RX ADMIN — IRBESARTAN 150 MG: 150 TABLET ORAL at 08:42

## 2017-01-30 RX ADMIN — ATORVASTATIN CALCIUM 20 MG: 20 TABLET, FILM COATED ORAL at 08:42

## 2017-01-30 RX ADMIN — INSULIN ASPART 5 UNITS: 100 INJECTION, SOLUTION INTRAVENOUS; SUBCUTANEOUS at 21:31

## 2017-01-30 RX ADMIN — NEBIVOLOL HYDROCHLORIDE 5 MG: 5 TABLET ORAL at 08:42

## 2017-01-30 RX ADMIN — CIPROFLOXACIN 400 MG: 2 INJECTION, SOLUTION INTRAVENOUS at 05:23

## 2017-01-31 VITALS
HEART RATE: 60 BPM | OXYGEN SATURATION: 97 % | HEIGHT: 73 IN | BODY MASS INDEX: 41.75 KG/M2 | TEMPERATURE: 97.7 F | DIASTOLIC BLOOD PRESSURE: 81 MMHG | WEIGHT: 315 LBS | SYSTOLIC BLOOD PRESSURE: 135 MMHG | RESPIRATION RATE: 16 BRPM

## 2017-01-31 LAB
ALBUMIN SERPL-MCNC: 3.2 G/DL (ref 3.5–5.2)
ALBUMIN/GLOB SERPL: 1.2 G/DL
ALP SERPL-CCNC: 62 U/L (ref 39–117)
ALT SERPL W P-5'-P-CCNC: 19 U/L (ref 1–41)
ANION GAP SERPL CALCULATED.3IONS-SCNC: 12.7 MMOL/L
AST SERPL-CCNC: 13 U/L (ref 1–40)
BACTERIA SPEC ANAEROBE CULT: NORMAL
BASOPHILS # BLD AUTO: 0.02 10*3/MM3 (ref 0–0.2)
BASOPHILS NFR BLD AUTO: 0.1 % (ref 0–1.5)
BILIRUB SERPL-MCNC: 0.2 MG/DL (ref 0.1–1.2)
BUN BLD-MCNC: 7 MG/DL (ref 6–20)
BUN/CREAT SERPL: 9.7 (ref 7–25)
CALCIUM SPEC-SCNC: 8.8 MG/DL (ref 8.6–10.5)
CHLORIDE SERPL-SCNC: 101 MMOL/L (ref 98–107)
CO2 SERPL-SCNC: 26.3 MMOL/L (ref 22–29)
CREAT BLD-MCNC: 0.72 MG/DL (ref 0.76–1.27)
DEPRECATED RDW RBC AUTO: 40.7 FL (ref 37–54)
EOSINOPHIL # BLD AUTO: 0.18 10*3/MM3 (ref 0–0.7)
EOSINOPHIL NFR BLD AUTO: 1.3 % (ref 0.3–6.2)
ERYTHROCYTE [DISTWIDTH] IN BLOOD BY AUTOMATED COUNT: 12.7 % (ref 11.5–14.5)
GFR SERPL CREATININE-BSD FRML MDRD: 119 ML/MIN/1.73
GLOBULIN UR ELPH-MCNC: 2.7 GM/DL
GLUCOSE BLD-MCNC: 236 MG/DL (ref 65–99)
GLUCOSE BLDC GLUCOMTR-MCNC: 215 MG/DL (ref 70–130)
GLUCOSE BLDC GLUCOMTR-MCNC: 216 MG/DL (ref 70–130)
GLUCOSE BLDC GLUCOMTR-MCNC: 229 MG/DL (ref 70–130)
HCT VFR BLD AUTO: 41 % (ref 40.4–52.2)
HGB BLD-MCNC: 13.8 G/DL (ref 13.7–17.6)
IMM GRANULOCYTES # BLD: 0.16 10*3/MM3 (ref 0–0.03)
IMM GRANULOCYTES NFR BLD: 1.1 % (ref 0–0.5)
LYMPHOCYTES # BLD AUTO: 5.37 10*3/MM3 (ref 0.9–4.8)
LYMPHOCYTES NFR BLD AUTO: 38.6 % (ref 19.6–45.3)
MAGNESIUM SERPL-MCNC: 1.5 MG/DL (ref 1.6–2.6)
MCH RBC QN AUTO: 29.3 PG (ref 27–32.7)
MCHC RBC AUTO-ENTMCNC: 33.7 G/DL (ref 32.6–36.4)
MCV RBC AUTO: 87 FL (ref 79.8–96.2)
MONOCYTES # BLD AUTO: 1.08 10*3/MM3 (ref 0.2–1.2)
MONOCYTES NFR BLD AUTO: 7.8 % (ref 5–12)
NEUTROPHILS # BLD AUTO: 7.11 10*3/MM3 (ref 1.9–8.1)
NEUTROPHILS NFR BLD AUTO: 51.1 % (ref 42.7–76)
PLATELET # BLD AUTO: 255 10*3/MM3 (ref 140–500)
PMV BLD AUTO: 10.6 FL (ref 6–12)
POTASSIUM BLD-SCNC: 3.2 MMOL/L (ref 3.5–5.2)
PROT SERPL-MCNC: 5.9 G/DL (ref 6–8.5)
RBC # BLD AUTO: 4.71 10*6/MM3 (ref 4.6–6)
SODIUM BLD-SCNC: 140 MMOL/L (ref 136–145)
WBC NRBC COR # BLD: 13.92 10*3/MM3 (ref 4.5–10.7)

## 2017-01-31 PROCEDURE — 25010000002 CIPROFLOXACIN PER 200 MG: Performed by: FAMILY MEDICINE

## 2017-01-31 PROCEDURE — 25010000002 ENOXAPARIN PER 10 MG: Performed by: FAMILY MEDICINE

## 2017-01-31 PROCEDURE — 85025 COMPLETE CBC W/AUTO DIFF WBC: CPT | Performed by: FAMILY MEDICINE

## 2017-01-31 PROCEDURE — 80053 COMPREHEN METABOLIC PANEL: CPT | Performed by: FAMILY MEDICINE

## 2017-01-31 PROCEDURE — 25010000002 PIPERACILLIN SOD-TAZOBACTAM PER 1 G: Performed by: FAMILY MEDICINE

## 2017-01-31 PROCEDURE — 82962 GLUCOSE BLOOD TEST: CPT

## 2017-01-31 PROCEDURE — 83735 ASSAY OF MAGNESIUM: CPT | Performed by: FAMILY MEDICINE

## 2017-01-31 PROCEDURE — 25010000002 HYDROMORPHONE PER 4 MG: Performed by: FAMILY MEDICINE

## 2017-01-31 PROCEDURE — 25010000002 VANCOMYCIN: Performed by: FAMILY MEDICINE

## 2017-01-31 RX ORDER — HYDROCODONE BITARTRATE AND ACETAMINOPHEN 5; 325 MG/1; MG/1
1 TABLET ORAL EVERY 6 HOURS PRN
Qty: 30 TABLET | Refills: 0 | Status: SHIPPED | OUTPATIENT
Start: 2017-01-31 | End: 2019-01-03 | Stop reason: HOSPADM

## 2017-01-31 RX ORDER — CEPHALEXIN 500 MG/1
500 CAPSULE ORAL 4 TIMES DAILY
Qty: 40 CAPSULE | Refills: 0 | Status: SHIPPED | OUTPATIENT
Start: 2017-01-31 | End: 2017-02-10

## 2017-01-31 RX ORDER — ACETAMINOPHEN 325 MG/1
325 TABLET ORAL EVERY 4 HOURS PRN
Refills: 0
Start: 2017-01-31

## 2017-01-31 RX ORDER — HYOSCYAMINE SULFATE 16 OZ
SOLUTION MISCELLANEOUS
Start: 2017-01-31 | End: 2018-04-10

## 2017-01-31 RX ADMIN — TAZOBACTAM SODIUM AND PIPERACILLIN SODIUM 3.38 G: 375; 3 INJECTION, SOLUTION INTRAVENOUS at 10:54

## 2017-01-31 RX ADMIN — HYDROMORPHONE HYDROCHLORIDE 0.5 MG: 1 INJECTION, SOLUTION INTRAMUSCULAR; INTRAVENOUS; SUBCUTANEOUS at 01:10

## 2017-01-31 RX ADMIN — VANCOMYCIN HYDROCHLORIDE 1750 MG: 1 INJECTION, POWDER, LYOPHILIZED, FOR SOLUTION INTRAVENOUS at 01:20

## 2017-01-31 RX ADMIN — PIOGLITAZONE 30 MG: 30 TABLET ORAL at 08:34

## 2017-01-31 RX ADMIN — PANTOPRAZOLE SODIUM 40 MG: 40 TABLET, DELAYED RELEASE ORAL at 05:46

## 2017-01-31 RX ADMIN — ENOXAPARIN SODIUM 40 MG: 40 INJECTION SUBCUTANEOUS at 08:35

## 2017-01-31 RX ADMIN — METFORMIN HYDROCHLORIDE 1000 MG: 1000 TABLET ORAL at 17:54

## 2017-01-31 RX ADMIN — SITAGLIPTIN 100 MG: 100 TABLET, FILM COATED ORAL at 08:34

## 2017-01-31 RX ADMIN — IRBESARTAN 150 MG: 150 TABLET ORAL at 08:35

## 2017-01-31 RX ADMIN — NIFEDIPINE 90 MG: 90 TABLET, EXTENDED RELEASE ORAL at 08:34

## 2017-01-31 RX ADMIN — NEBIVOLOL HYDROCHLORIDE 5 MG: 5 TABLET ORAL at 08:35

## 2017-01-31 RX ADMIN — HYOSCYAMINE SULFATE: 16 SOLUTION at 00:00

## 2017-01-31 RX ADMIN — VANCOMYCIN HYDROCHLORIDE 1750 MG: 1 INJECTION, POWDER, LYOPHILIZED, FOR SOLUTION INTRAVENOUS at 08:51

## 2017-01-31 RX ADMIN — TAZOBACTAM SODIUM AND PIPERACILLIN SODIUM 3.38 G: 375; 3 INJECTION, SOLUTION INTRAVENOUS at 01:20

## 2017-01-31 RX ADMIN — METFORMIN HYDROCHLORIDE 1000 MG: 1000 TABLET ORAL at 08:35

## 2017-01-31 RX ADMIN — CIPROFLOXACIN 400 MG: 2 INJECTION, SOLUTION INTRAVENOUS at 04:47

## 2017-01-31 RX ADMIN — ATORVASTATIN CALCIUM 20 MG: 20 TABLET, FILM COATED ORAL at 08:35

## 2017-02-01 PROBLEM — I10 SEVERE HYPERTENSION: Status: RESOLVED | Noted: 2017-01-26 | Resolved: 2017-02-01

## 2017-02-01 PROBLEM — R82.4 KETONURIA: Status: RESOLVED | Noted: 2017-01-26 | Resolved: 2017-02-01

## 2017-02-01 PROBLEM — J20.9 ACUTE BRONCHITIS: Status: RESOLVED | Noted: 2017-01-26 | Resolved: 2017-02-01

## 2017-02-01 PROBLEM — E55.9 VITAMIN D DEFICIENCY: Status: RESOLVED | Noted: 2017-01-26 | Resolved: 2017-02-01

## 2017-02-01 PROBLEM — E78.1 HYPERTRIGLYCERIDEMIA: Status: RESOLVED | Noted: 2017-01-26 | Resolved: 2017-02-01

## 2017-02-01 PROBLEM — L03.211 FACIAL CELLULITIS: Status: RESOLVED | Noted: 2017-01-25 | Resolved: 2017-02-01

## 2017-02-01 PROBLEM — R09.02 HYPOXIA: Status: RESOLVED | Noted: 2017-01-26 | Resolved: 2017-02-01

## 2017-02-01 PROBLEM — E87.1 HYPONATREMIA: Status: RESOLVED | Noted: 2017-01-26 | Resolved: 2017-02-01

## 2017-02-01 PROBLEM — L02.03: Status: RESOLVED | Noted: 2017-01-26 | Resolved: 2017-02-01

## 2017-02-01 PROBLEM — R81 GLUCOSURIA: Status: RESOLVED | Noted: 2017-01-26 | Resolved: 2017-02-01

## 2017-02-01 RX ORDER — NEBIVOLOL 5 MG/1
5 TABLET ORAL DAILY
Qty: 90 TABLET | Refills: 4
Start: 2017-02-01 | End: 2018-04-10

## 2017-02-01 RX ORDER — IRBESARTAN 300 MG/1
300 TABLET ORAL NIGHTLY
Qty: 90 TABLET | Refills: 4
Start: 2017-02-01 | End: 2018-04-10

## 2017-02-01 RX ORDER — PIOGLITAZONEHYDROCHLORIDE 30 MG/1
30 TABLET ORAL DAILY
Qty: 90 TABLET | Refills: 4 | Status: ON HOLD
Start: 2017-02-01 | End: 2019-01-03 | Stop reason: SDUPTHER

## 2017-02-01 RX ORDER — ERGOCALCIFEROL 1.25 MG/1
50000 CAPSULE ORAL
Qty: 12 CAPSULE | Refills: 4
Start: 2017-02-01

## 2017-02-01 RX ORDER — NIFEDIPINE 90 MG/1
90 TABLET, EXTENDED RELEASE ORAL DAILY
Qty: 90 TABLET | Refills: 4
Start: 2017-02-01 | End: 2018-04-10

## 2017-02-01 RX ORDER — PANTOPRAZOLE SODIUM 40 MG/1
40 TABLET, DELAYED RELEASE ORAL DAILY
Qty: 90 TABLET | Refills: 4
Start: 2017-02-01 | End: 2018-04-10

## 2017-02-01 NOTE — DISCHARGE SUMMARY
Graham Meléndez  46 y.o.  1970  [id number]  [id number]  02/01/17    Patient Care Team:  Rosenberg Acosta Reyes, MD as PCP - General (Family Medicine)    PRINCIPLE DIAGNOSIS:  Acute Left Sided Facial Cellulitis due to Staphylococcus aureus (A) without Necrotizing Fascitis nor Sepsis but with possible Osteomyelitis.    SECONDARY DIAGNOSIS:    1)  Hypoxia.    2)  Ketonuria.    3)  Glucosuria.    4)  Hyponatremia.    5)  Acute Bronchitis.    6)  Vitamin D Deficiency.    7)  Hypertriglyceridemia.    8)  Severe Hypertension.    9)  Right Temporal Carbuncle.    10)  Uncontrolled Diabetes Mellitus Type 2 with Hyperosmolarity.    11)  Hypertension.    12)  Hyperlipidemia.    13)  Tobacco Abuse.    14)  Myofascial Pain.    15)  Allergic Rhinitis.    16)  Anxiety and Depression.    17)  Gastroesophageal Reflux Disease.    18)  Low Back Pain with Radiculopathy.    19)  Uncontrolled Diabetes Mellitus Type 2.    CHIEF COMPLAINT:  Left sided facial swelling with pain and oozing bump on left temple.     HPI  HISTORY OF PRESENT ILLNESS:  The patient is 46 years old white male with a known past medical history of Hypertension, Hyperlipidemia, Tobacco Abuse, Myofascial Pain, Allergic Rhinitis, Gastroesophageal Reflux Disease, Low Back Pain with Radiculopathy, Uncontrolled Diabetes Mellitus Type 2, and Anxiety and Depression who went to the emergency room due to left sided facial swelling and pain.  The present condition started nine days prior to admission on 1/15/2017 when he felt slight swelling of the left side of the face and a small boil on his left temple.  He started using some form of suave that he bought over the counter that was suppose to heal it.  He kept thinking that it would heal.  It continued to get bigger and painful.  His blood sugars has been high.  His blood pressure has also been high.  He has not had any medications for his blood sugar except for metformin nor for his hypertension for the past few months.   "When asked why he did not go to the office, he stated that he lost his insurance and that we did not have any of his medications anymore.  A few hours prior to admission, the left side of his face became so swollen, became hot to touch, and became tender to touch,  The boil had enlarge, becaming very painful, and had popped. He states \"Stuff was coming out and it hurt so bad.\"  He also states that he has been coughing a bit.  He also continues to smoke.  He has been hurting all over.  He also developed fever and chills.  He denies body aches nor fatigue.  He has also been very sweaty.  He finally decided to go to [facility] emergency room.  In the emergency room, he was noted to have cellulitis of the left side of her face as well as a large carbuncle on the left temporal area. He is being admitted for further evaluation and management.     PAST MEDICAL HISTORY:    1)  Hypertension.    2)  Hyperlipidemia.    3)  Tobacco Abuse.    4)  Myofascial Pain.    5)  Allergic Rhinitis.    6)  Gastroesophageal Reflux Disease.    7)  Low Back Pain with Radiculopathy.    8)  Uncontrolled Diabetes Mellitus Type 2.     PAST SURGICAL HISTORY:    1)  Dental Procedure.    2)  Incision and Drainage of Right Groin Abscess.     ALLERGIES:  NO KNOWN DRUG ALLERGIES.    No Known Allergies    MEDICATIONS:    1)  Actos 30 mg 1 tab PO Q Daily.    2)  Lipitor 20 mg 1 tab PO Q Daily.    3)  Bystolic 5 mg 1 tab PO Q Daily.    4)  Norvasc 5 mg 1 tab PO Q Daily.    5)  Victoza 1.2 mg inject SQ Q Daily.    6)  Januvia 100 mg 1 tab PO Q Daily.    7)  Metformin 1000 mg 1 tab PO BID.    8)  Avalide 150/12.5 mg 1 tab PO Q Daily.    MEDICATIONS IN THE WARDS:  No current facility-administered medications for this encounter.     Current Outpatient Prescriptions:   •  Atorvastatin Calcium (LIPITOR PO), Take  by mouth., Disp: , Rfl:   •  metFORMIN (GLUCOPHAGE) 1000 MG tablet, Take 1,000 mg by mouth 2 (Two) Times a Day With Meals., Disp: , Rfl:   •  " SITagliptin (JANUVIA) 100 MG tablet, Take 100 mg by mouth Daily., Disp: , Rfl:   •  UNKNOWN TO PATIENT, A BLOOD PRESSURE MEDICATION, Disp: , Rfl:   •  acetaminophen (TYLENOL) 325 MG tablet, Take 1 tablet by mouth Every 4 (Four) Hours As Needed for mild pain (1-3)., Disp: , Rfl: 0  •  cephalexin (KEFLEX) 500 MG capsule, Take 1 capsule by mouth 4 (Four) Times a Day for 10 days., Disp: 40 capsule, Rfl: 0  •  dakin's (HYSEPT) 0.25 % solution topical solution, Apply  topically Daily. Saturate 2 X 2 gauze with Dakins solution and re-pack wound daily, by VNA, Disp: , Rfl:   •  HYDROcodone-acetaminophen (NORCO) 5-325 MG per tablet, Take 1 tablet by mouth Every 6 (Six) Hours As Needed for moderate pain (4-6)., Disp: 30 tablet, Rfl: 0  •  irbesartan (AVAPRO) 300 MG tablet, Take 1 tablet by mouth Every Night., Disp: 90 tablet, Rfl: 4  •  Liraglutide 18 MG/3ML solution pen-injector, Inject 1.8 mg under the skin Daily., Disp: 10 pen, Rfl: 4  •  nebivolol (BYSTOLIC) 5 MG tablet, Take 1 tablet by mouth Daily., Disp: 90 tablet, Rfl: 4  •  NIFEdipine XL (PROCARDIA XL) 90 MG 24 hr tablet, Take 1 tablet by mouth Daily., Disp: 90 tablet, Rfl: 4  •  pantoprazole (PROTONIX) 40 MG EC tablet, Take 1 tablet by mouth Daily., Disp: 90 tablet, Rfl: 4  •  pioglitazone (ACTOS) 30 MG tablet, Take 1 tablet by mouth Daily., Disp: 90 tablet, Rfl: 4  •  vitamin D (ERGOCALCIFEROL) 46420 UNITS capsule capsule, Take 1 capsule by mouth Every 7 (Seven) Days., Disp: 12 capsule, Rfl: 4    SOCIAL HISTORY:  He does eat fair.  He does exercise.  He smokes 1 to 2 packs per day since he was 38 years old and quit at 45 years old.  However, he has started smoking again but now only smokes 1 pack per day. He drinks a fifth of Jack Vel twice a year.  He denies illicit drug abuse.  He has had an eleventh grade education.  He works as a .  He remains single.  He lives in an apartment with his parents.  He has no pets.  He has good family support.  He is  not sexually active.  He has no child.  There is no dysfunction at home.  There are no weapons, violence, nor abuse at home.    Social History     Social History   • Marital status: Single     Spouse name: N/A   • Number of children: 0   • Years of education: 11th grade     Occupational History   •       Social History Main Topics   • Smoking status: Current Every Day Smoker     Packs/day: 1.00     Years: 8.00   • Smokeless tobacco: Not on file   • Alcohol use Yes      Comment: fifth of Joe Geronimo twice a year   • Drug use: No   • Sexual activity: Not Currently     Partners: Female     Other Topics Concern   • Not on file     Social History Narrative    He does eat fair.  He does exercise.  He smokes 1 to 2 packs per day since he was 38 years old and quit at 45 years old.  However, he has started smoking again but now only smokes 1 pack per day.  He drinks a fifth of Joe Vel twice a year.  He denies illicit drug abuse.  He has had an eleventh grade education.  He works as a .  He remains single.  He lives in an apartment with his parents.  He has no pets.  He has good family support.  He is not sexually active.  He has no child.  There is no dysfunction at home.  There are no weapons, violence, nor abuse at home.     Social History     Social History Narrative    He does eat fair.  He does exercise.  He smokes 1 to 2 packs per day since he was 38 years old and quit at 45 years old.  However, he has started smoking again but now only smokes 1 pack per day.  He drinks a fifth of Joe Vel twice a year.  He denies illicit drug abuse.  He has had an eleventh grade education.  He works as a .  He remains single.  He lives in an apartment with his parents.  He has no pets.  He has good family support.  He is not sexually active.  He has no child.  There is no dysfunction at home.  There are no weapons, violence, nor abuse at home.      FAMILY HISTORY:  Mother has hypertension  and gastroesophageal reflux disease.  She is still alive.  Father has hypertension, hyperlipidemia, coronary artery disease, myocardial infarction, and diabetes mellitus type.  He is still alive.    Family History   Problem Relation Age of Onset   • Hypertension Mother    • ZOEY disease Mother    • Hypertension Father    • Hyperlipidemia Father    • Diabetes Father    • Coronary artery disease Father    • Heart attack Father      PSYCHIATRIC HISTORY:  Anxiety and Depression.    REVIEW OF SYSTEM:  None except those stated on the History of Present Illness.    Results Review:    I reviewed the patient's new clinical results.  Discussed with patient.    PHYSICAL EXAMINATION:    VITAL SIGNS:  TMax  99.2  MI  93 RR  18  B/P  143/70  BMI  43.5.    GENERAL:  Fairly nourish.  Fairly developed.  Morbidly obese.  Fair Affect.    SKIN:  Diaphoretic at face and neck.  Fair skin turgor.  Not jaundice.  Positive erythema and hot to touch of left facial, left orbital, left temporal, and left ear.    HEENMT:  Normocephalic/Atraumatic.  Pinkish palpebral conjunctiva.  Anicteric sclerae.  PERRLA.  EOMI.  Oral mucosal membrane are moist.  Pharynx is not red.   Positive large carbuncle on the left temporal area with noted drainage.    NECK:  Supple.  No JVD.  No bruits.    THORAX AND LUNGS:  Slightly decrease breath sounds with rhonchi and wheezing without rales.  No tenderness upon deep palpation of the costochondral junction.    CARDIOVASCULAR SYSTEM:  Regular rate and rhythm, no murmur.  Normal S1 / S2.  No S3 / S4.  No heaves.    ABDOMEN:  No Hepatosplenomegaly.  Soft.  Not tender.  Not distended.  Positive bowel sounds.    EXTREMITIES:  No cyanosis, clubbing, nor edema.  No calf tenderness.    CNS:  Alert.  Oriented x 3. Cranial nerves are intact.  Sensory / Motor are intact.    LABORATORY DATA:  Lab Results (all)     Procedure Component Value Units Date/Time    CBC & Differential [[id number]] Collected:  01/24/17 9637    Specimen:   Blood Updated:  01/24/17 1700    Narrative:       The following orders were created for panel order CBC & Differential.  Procedure                               Abnormality         Status                     ---------                               -----------         ------                     CBC Auto Differential[[id number]]         Abnormal            Final result                 Please view results for these tests on the individual orders.    CBC Auto Differential [[id number]]  (Abnormal) Collected:  01/24/17 1636    Specimen:  Blood Updated:  01/24/17 1700     WBC 18.28 (H) 10*3/mm3      RBC 5.34 10*6/mm3      Hemoglobin 15.8 g/dL      Hematocrit 48.0 %      MCV 89.9 fL      MCH 29.6 pg      MCHC 32.9 g/dL      RDW 13.1 %      RDW-SD 42.9 fl      MPV 11.4 fL      Platelets 237 10*3/mm3      Neutrophil % 59.2 %      Lymphocyte % 31.5 %      Monocyte % 7.9 %      Eosinophil % 0.9 %      Basophil % 0.2 %      Immature Grans % 0.3 %      Neutrophils, Absolute 10.82 (H) 10*3/mm3      Lymphocytes, Absolute 5.76 (H) 10*3/mm3      Monocytes, Absolute 1.44 (H) 10*3/mm3      Eosinophils, Absolute 0.16 10*3/mm3      Basophils, Absolute 0.04 10*3/mm3      Immature Grans, Absolute 0.06 (H) 10*3/mm3     Comprehensive Metabolic Panel [[id number]]  (Abnormal) Collected:  01/24/17 1636    Specimen:  Blood Updated:  01/24/17 1717     Glucose 317 (H) mg/dL      BUN 10 mg/dL      Creatinine 0.88 mg/dL      Sodium 138 mmol/L      Potassium 4.8 mmol/L      Chloride 95 (L) mmol/L      CO2 28.8 mmol/L      Calcium 9.9 mg/dL      Total Protein 7.7 g/dL      Albumin 3.90 g/dL      ALT (SGPT) 15 U/L      AST (SGOT) 10 U/L      Alkaline Phosphatase 108 U/L      Total Bilirubin 0.5 mg/dL      eGFR Non African Amer 94 mL/min/1.73      Globulin 3.8 gm/dL      A/G Ratio 1.0 g/dL      BUN/Creatinine Ratio 11.4      Anion Gap 14.2 mmol/L     Procalcitonin [[id number]]  (Normal) Collected:  01/24/17 1636    Specimen:  Blood Updated:  01/24/17 9470      "Procalcitonin 0.14 ng/mL     Narrative:       As a Marker for Sepsis (Non-Neonates):   1. <0.5 ng/mL represents a low risk of severe sepsis and/or septic shock.  1. >2 ng/mL represents a high risk of severe sepsis and/or septic shock.    As a Marker for Lower Respiratory Tract Infections that require antibiotic therapy:  PCT on Admission     Antibiotic Therapy             6-12 Hrs later  > 0.5                Strongly Recommended            >0.25 - <0.5         Recommended  0.1 - 0.25           Discouraged                   Remeasure/reassess PCT  <0.1                 Strongly Discouraged          Remeasure/reassess PCT      As 28 day mortality risk marker: \"Change in Procalcitonin Result\" (> 80 % or <=80 %) if Day 0 (or Day 1) and Day 4 values are available. Refer to [email or web address]   Change in PCT <=80 %   A decrease of PCT levels below or equal to 80 % defines a positive change in PCT test result representing a higher risk for 28-day all-cause mortality of patients diagnosed with severe sepsis or septic shock.  Change in PCT > 80 %   A decrease of PCT levels of more than 80 % defines a negative change in PCT result representing a lower risk for 28-day all-cause mortality of patients diagnosed with severe sepsis or septic shock.                Lactic Acid, Plasma [[id number]]  (Normal) Collected:  01/24/17 2316    Specimen:  Blood Updated:  01/25/17 0005     Lactate 1.2 mmol/L     POC Glucose Fingerstick [[id number]]  (Abnormal) Collected:  01/25/17 0232    Specimen:  Blood Updated:  01/25/17 0242     Glucose 260 (H) mg/dL     Narrative:       Meter: [id number] : [id number] Joyner Jennifer    Hemoglobin A1c [[id number]]  (Abnormal) Collected:  01/24/17 1636    Specimen:  Blood Updated:  01/25/17 0317     Hemoglobin A1C 12.20 (H) %     Narrative:       Hemoglobin A1C Ranges:    Increased Risk for Diabetes  5.7% to 6.4%  Diabetes                     >= 6.5%  Diabetic Goal                < 7.0% "    Blood Gas, Arterial [[id number]]  (Abnormal) Collected:  01/25/17 0342    Specimen:  Arterial Blood Updated:  01/25/17 0344     Site Arterial: right radial      Conrad's Test Positive      pH, Arterial 7.406 pH units      pCO2, Arterial 41.5 mm Hg      pO2, Arterial 69.9 (L) mm Hg      HCO3, Arterial 26.1 mmol/L      Base Excess, Arterial 1.1 mmol/L      O2 Saturation Calculated 93.8 %      Barometric Pressure for Blood Gas 740.6 mmHg      Modality Room air      Rate 20 Breaths/minute     Narrative:       SAT 96 Meter: [id number] : [id number] Bert Gandhi    CBC Auto Differential [[id number]]  (Abnormal) Collected:  01/25/17 0358    Specimen:  Blood Updated:  01/25/17 0416     WBC 18.58 (H) 10*3/mm3      RBC 5.19 10*6/mm3      Hemoglobin 15.1 g/dL      Hematocrit 45.7 %      MCV 88.1 fL      MCH 29.1 pg      MCHC 33.0 g/dL      RDW 12.9 %      RDW-SD 41.3 fl      MPV 11.2 fL      Platelets 213 10*3/mm3      Neutrophil % 62.7 %      Lymphocyte % 25.8 %      Monocyte % 9.7 %      Eosinophil % 1.1 %      Basophil % 0.3 %      Immature Grans % 0.4 %      Neutrophils, Absolute 11.65 (H) 10*3/mm3      Lymphocytes, Absolute 4.79 10*3/mm3      Monocytes, Absolute 1.81 (H) 10*3/mm3      Eosinophils, Absolute 0.21 10*3/mm3      Basophils, Absolute 0.05 10*3/mm3      Immature Grans, Absolute 0.07 (H) 10*3/mm3      nRBC 0.0 /100 WBC     D-dimer, Quantitative [[id number]]  (Normal) Collected:  01/25/17 0358    Specimen:  Blood Updated:  01/25/17 0432     D-Dimer, Quantitative 0.42 MCGFEU/mL     Narrative:       The Stago D-Dimer test has been cleared by the FDA for the exclusion of pulmonary embolism (PE). The Stago D-Dimer test, along with clinical findings, may be used to aid in the diagnosis of deep vein thrombosis (DVT).    Protime-INR [[id number]]  (Normal) Collected:  01/25/17 0358    Specimen:  Blood Updated:  01/25/17 0432     Protime 13.8 Seconds      INR 1.10     aPTT [[id number]]  (Normal) Collected:  01/25/17  0358    Specimen:  Blood Updated:  01/25/17 0432     PTT 26.6 seconds     Fibrinogen [[id number]]  (Abnormal) Collected:  01/25/17 0358    Specimen:  Blood Updated:  01/25/17 0432     Fibrinogen 652 (H) mg/dL     Lactic Acid, Plasma [[id number]]  (Normal) Collected:  01/25/17 0358    Specimen:  Blood Updated:  01/25/17 0432     Lactate 1.3 mmol/L     Lipid Panel [[id number]]  (Abnormal) Collected:  01/25/17 0358    Specimen:  Blood Updated:  01/25/17 0445     Total Cholesterol 149 mg/dL      Triglycerides 250 (H) mg/dL      HDL Cholesterol 27 (L) mg/dL      LDL Cholesterol  72 mg/dL      VLDL Cholesterol 50 (H) mg/dL      LDL/HDL Ratio 2.67     Narrative:       Cholesterol Reference Ranges  (U.S. Department of Health and Human Services ATP III Classifications)    Desirable          <200 mg/dL  Borderline High    200-239 mg/dL  High Risk          >240 mg/dL      Triglyceride Reference Ranges  (U.S. Department of Health and Human Services ATP III Classifications)    Normal           <150 mg/dL  Borderline High  150-199 mg/dL  High             200-499 mg/dL  Very High        >500 mg/dL    HDL Reference Ranges  (U.S. Department of Health and Human Services ATP III Classifcations)    Low     <40 mg/dl (major risk factor for CHD)  High    >60 mg/dl ('negative' risk factor for CHD)        LDL Reference Ranges  (U.S. Department of Health and Human Services ATP III Classifcations)    Optimal          <100 mg/dL  Near Optimal     100-129 mg/dL  Borderline High  130-159 mg/dL  High             160-189 mg/dL  Very High        >189 mg/dL    Magnesium [[id number]]  (Normal) Collected:  01/25/17 0358    Specimen:  Blood Updated:  01/25/17 0445     Magnesium 2.0 mg/dL     Uric Acid [[id number]]  (Normal) Collected:  01/25/17 0358    Specimen:  Blood Updated:  01/25/17 0445     Uric Acid 4.1 mg/dL     Lactate Dehydrogenase [[id number]]  (Normal) Collected:  01/25/17 0358    Specimen:  Blood Updated:  01/25/17 0445      U/L     C-reactive  Protein [[id number]]  (Abnormal) Collected:  01/25/17 0358    Specimen:  Blood Updated:  01/25/17 0445     C-Reactive Protein 5.00 (H) mg/dL     Comprehensive Metabolic Panel [[id number]]  (Abnormal) Collected:  01/25/17 0358    Specimen:  Blood Updated:  01/25/17 0445     Glucose 308 (H) mg/dL      BUN 7 mg/dL      Creatinine 0.81 mg/dL      Sodium 135 (L) mmol/L      Potassium 4.0 mmol/L      Chloride 94 (L) mmol/L      CO2 28.6 mmol/L      Calcium 9.3 mg/dL      Total Protein 7.5 g/dL      Albumin 3.90 g/dL      ALT (SGPT) 12 U/L      AST (SGOT) 14 U/L      Alkaline Phosphatase 106 U/L      Total Bilirubin 0.6 mg/dL      eGFR Non African Amer 104 mL/min/1.73      Globulin 3.6 gm/dL      A/G Ratio 1.1 g/dL      BUN/Creatinine Ratio 8.6      Anion Gap 12.4 mmol/L     Vitamin D 25 Hydroxy [[id number]]  (Abnormal) Collected:  01/25/17 0358    Specimen:  Blood Updated:  01/25/17 0459     25 Hydroxy, Vitamin D 17.9 (L) ng/ml     Narrative:       Reference Range for Total Vitamin D 25(OH)     Deficiency    <20.0 ng/mL   Insufficiency 21-29 ng/mL   Sufficiency    ng/mL  Toxicity      >100 ng/ml         Sedimentation Rate [[id number]]  (Abnormal) Collected:  01/25/17 0358    Specimen:  Blood Updated:  01/25/17 0521     Sed Rate 42 (H) mm/hr     POC Glucose Fingerstick [[id number]]  (Abnormal) Collected:  01/25/17 0608    Specimen:  Blood Updated:  01/25/17 0610     Glucose 224 (H) mg/dL     Narrative:       Meter: [id number] : [id number] Joyner Jennifer    Urine Drug Screen [[id number]]  (Abnormal) Collected:  01/25/17 0639    Specimen:  Urine Updated:  01/25/17 0721     Amphet/Methamphet, Screen Negative      Barbiturates Screen, Urine Negative      Benzodiazepine Screen, Urine Negative      Cocaine Screen, Urine Negative      Opiate Screen Positive (A)      THC, Screen, Urine Negative      Methadone Screen, Urine Negative      Oxycodone Screen, Urine Positive (A)     Narrative:       Negative Thresholds For Drugs  Screened:     Amphetamines               500 ng/ml   Barbiturates               200 ng/ml   Benzodiazepines            100 ng/ml   Cocaine                    300 ng/ml   Methadone                  300 ng/ml   Opiates                    300 ng/ml   Oxycodone                  100 ng/ml   THC                        50 ng/ml    The Normal Value for all drugs tested is negative. This report includes final unconfirmed screening results to be used for medical treatment purposes only. Unconfirmed results must not be used for non-medical purposes such as employment or legal testing. Clinical consideration should be applied to any drug of abuse test, particulary when unconfirmed results are used.    Urinalysis With / Culture If Indicated [[id number]]  (Abnormal) Collected:  01/25/17 0639    Specimen:  Urine Updated:  01/25/17 0732     Color, UA Yellow      Appearance, UA Clear      pH, UA 7.0      Specific Gravity, UA 1.027      Glucose, UA >=1000 mg/dL (3+) (A)      Ketones, UA 40 mg/dL (2+) (A)      Bilirubin, UA Negative      Blood, UA Negative      Protein, UA Negative      Leuk Esterase, UA Negative      Nitrite, UA Negative      Urobilinogen, UA 1.0 E.U./dL     Narrative:       Urine microscopic not indicated.    POC Glucose Fingerstick [[id number]]  (Abnormal) Collected:  01/25/17 1108    Specimen:  Blood Updated:  01/25/17 1109     Glucose 272 (H) mg/dL     Narrative:       Meter: [id number] : [id number] Ma-papeterie    POC Glucose Fingerstick [[id number]]  (Abnormal) Collected:  01/25/17 1608    Specimen:  Blood Updated:  01/25/17 1609     Glucose 292 (H) mg/dL     Narrative:       Meter: [id number] : [id number] [place]    POC Glucose Fingerstick [[id number]]  (Abnormal) Collected:  01/25/17 2159    Specimen:  Blood Updated:  01/25/17 2200     Glucose 235 (H) mg/dL     Narrative:       Meter: [id number] : [id number] Ar Rodriguez    Vancomycin, Trough [[id number]]  (Normal) Collected:  01/25/17 1950     Specimen:  Blood Updated:  01/26/17 0022     Vancomycin Trough 9.30 mcg/mL     Magnesium [[id number]]  (Normal) Collected:  01/26/17 0337    Specimen:  Blood Updated:  01/26/17 0430     Magnesium 2.0 mg/dL     Comprehensive Metabolic Panel [[id number]]  (Abnormal) Collected:  01/26/17 0337    Specimen:  Blood Updated:  01/26/17 0432     Glucose 380 (H) mg/dL      BUN 14 mg/dL      Creatinine 0.83 mg/dL      Sodium 130 (L) mmol/L      Potassium 4.4 mmol/L      Chloride 89 (L) mmol/L      CO2 22.8 mmol/L      Calcium 9.3 mg/dL      Total Protein 7.3 g/dL      Albumin 3.50 g/dL      ALT (SGPT) 11 U/L      AST (SGOT) 12 U/L      Alkaline Phosphatase 99 U/L      Total Bilirubin 0.6 mg/dL      eGFR Non African Amer 101 mL/min/1.73      Globulin 3.8 gm/dL      A/G Ratio 0.9 g/dL      BUN/Creatinine Ratio 16.9      Anion Gap 18.2 mmol/L     POC Glucose Fingerstick [[id number]]  (Abnormal) Collected:  01/26/17 0607    Specimen:  Blood Updated:  01/26/17 0608     Glucose 303 (H) mg/dL     Narrative:       Meter: [id number] : [id number] Ar Rodriguez    Thyroid Profile II [[id number]] Collected:  01/25/17 0934    Specimen:  Blood Updated:  01/26/17 0651     TSH 2.050 uIU/mL      T4, Total 8.9 ug/dL      T3 Uptake 27 %      Free Thyroxine Index 2.4      T3, Total 162 ng/dL     Narrative:       Performed at:  [address] [place] [address]  [id number]  : Agustin Centeno PhD, Phone:  [phone]    Thyroid Peroxidase Antibody [[id number]] Collected:  01/25/17 0358    Specimen:  Blood Updated:  01/26/17 0715     Thyroid Peroxidase Antibody 7 IU/mL     Narrative:       Performed at:  [address] [place] [address]  [id number]  : Agustin Centeno PhD, Phone:  [phone]    CBC & Differential [[id number]] Collected:  01/26/17 1153    Specimen:  Blood Updated:  01/26/17 1215    Narrative:       The following orders were created for panel order CBC & Differential.  Procedure                                Abnormality         Status                     ---------                               -----------         ------                     CBC Auto Differential[[id number]]         Abnormal            Final result                 Please view results for these tests on the individual orders.    CBC Auto Differential [[id number]]  (Abnormal) Collected:  01/26/17 1153    Specimen:  Blood Updated:  01/26/17 1215     WBC 18.33 (H) 10*3/mm3      RBC 5.10 10*6/mm3      Hemoglobin 15.1 g/dL      Hematocrit 43.7 %      MCV 85.7 fL      MCH 29.6 pg      MCHC 34.6 g/dL      RDW 12.4 %      RDW-SD 39.2 fl      MPV 11.0 fL      Platelets 258 10*3/mm3      Neutrophil % 86.9 (H) %      Lymphocyte % 10.0 (L) %      Monocyte % 2.5 (L) %      Eosinophil % 0.0 (L) %      Basophil % 0.1 %      Immature Grans % 0.5 %      Neutrophils, Absolute 15.94 (H) 10*3/mm3      Lymphocytes, Absolute 1.84 10*3/mm3      Monocytes, Absolute 0.45 10*3/mm3      Eosinophils, Absolute 0.00 10*3/mm3      Basophils, Absolute 0.01 10*3/mm3      Immature Grans, Absolute 0.09 (H) 10*3/mm3     Comprehensive Metabolic Panel [[id number]]  (Abnormal) Collected:  01/26/17 1153    Specimen:  Blood Updated:  01/26/17 1253     Glucose 386 (H) mg/dL      BUN 18 mg/dL      Creatinine 0.90 mg/dL      Sodium 130 (L) mmol/L      Potassium 4.2 mmol/L      Chloride 90 (L) mmol/L      CO2 17.7 (L) mmol/L      Calcium 9.6 mg/dL      Total Protein 7.1 g/dL      Albumin 3.60 g/dL      ALT (SGPT) 12 U/L      AST (SGOT) 12 U/L      Alkaline Phosphatase 96 U/L      Total Bilirubin 0.5 mg/dL      eGFR Non African Amer 92 mL/min/1.73      Globulin 3.5 gm/dL      A/G Ratio 1.0 g/dL      BUN/Creatinine Ratio 20.0      Anion Gap 22.3 mmol/L     POC Glucose Fingerstick [[id number]]  (Abnormal) Collected:  01/26/17 1312    Specimen:  Blood Updated:  01/26/17 1314     Glucose 365 (H) mg/dL     Narrative:       Meter: [id number] : [id number] German Cruz    C-Peptide  [[id number]] Collected:  01/25/17 0358    Specimen:  Blood Updated:  01/26/17 1518     C-Peptide 2.4 ng/mL       C-Peptide reference interval is for fasting patients.       Narrative:       Performed at:  [address] [place] [address]  [id number]  : Agustin Centeno PhD, Phone:  [phone]    POC Glucose Fingerstick [[id number]]  (Abnormal) Collected:  01/26/17 1650    Specimen:  Blood Updated:  01/26/17 1651     Glucose 362 (H) mg/dL     Narrative:       Meter: [id number] : [id number] German Cruz    POC Glucose Fingerstick [[id number]]  (Abnormal) Collected:  01/26/17 2119    Specimen:  Blood Updated:  01/26/17 2121     Glucose 319 (H) mg/dL     Narrative:       Meter: [id number] : [id number] Ar Rodriguez    CBC & Differential [[id number]] Collected:  01/27/17 0420    Specimen:  Blood Updated:  01/27/17 0459    Narrative:       The following orders were created for panel order CBC & Differential.  Procedure                               Abnormality         Status                     ---------                               -----------         ------                     CBC Auto Differential[[id number]]         Abnormal            Final result                 Please view results for these tests on the individual orders.    CBC Auto Differential [[id number]]  (Abnormal) Collected:  01/27/17 0420    Specimen:  Blood Updated:  01/27/17 0459     WBC 21.26 (H) 10*3/mm3      RBC 4.63 10*6/mm3      Hemoglobin 13.6 (L) g/dL      Hematocrit 39.6 (L) %      MCV 85.5 fL      MCH 29.4 pg      MCHC 34.3 g/dL      RDW 12.6 %      RDW-SD 39.7 fl      MPV 11.2 fL      Platelets 274 10*3/mm3      Neutrophil % 81.1 (H) %      Lymphocyte % 11.5 (L) %      Monocyte % 6.8 %      Eosinophil % 0.0 (L) %      Basophil % 0.0 %      Immature Grans % 0.6 (H) %      Neutrophils, Absolute 17.23 (H) 10*3/mm3      Lymphocytes, Absolute 2.45 10*3/mm3      Monocytes, Absolute 1.45 (H) 10*3/mm3      Eosinophils,  Absolute 0.00 10*3/mm3      Basophils, Absolute 0.00 10*3/mm3      Immature Grans, Absolute 0.13 (H) 10*3/mm3     Magnesium [[id number]]  (Normal) Collected:  01/27/17 0420    Specimen:  Blood Updated:  01/27/17 0525     Magnesium 2.0 mg/dL     POC Glucose Fingerstick [[id number]]  (Abnormal) Collected:  01/27/17 0527    Specimen:  Blood Updated:  01/27/17 0528     Glucose 299 (H) mg/dL     Narrative:       Meter: [id number] : [id number] [facility]    Comprehensive Metabolic Panel [[id number]]  (Abnormal) Collected:  01/27/17 0420    Specimen:  Blood Updated:  01/27/17 0537     Glucose 367 (H) mg/dL      BUN 24 (H) mg/dL      Creatinine 0.80 mg/dL      Sodium 132 (L) mmol/L      Potassium 4.7 mmol/L      Chloride 95 (L) mmol/L      CO2 18.7 (L) mmol/L      Calcium 9.2 mg/dL      Total Protein 6.4 g/dL      Albumin 3.30 (L) g/dL      ALT (SGPT) 9 U/L      AST (SGOT) 11 U/L      Alkaline Phosphatase 77 U/L      Total Bilirubin 0.4 mg/dL      eGFR Non African Amer 105 mL/min/1.73      Globulin 3.1 gm/dL      A/G Ratio 1.1 g/dL      BUN/Creatinine Ratio 30.0 (H)      Anion Gap 18.3 mmol/L     Serotonin Serum [[id number]] Collected:  01/25/17 0934    Specimen:  Blood Updated:  01/27/17 1015     Serotonin, Serum 31 ng/mL     Narrative:       Performed at:  [address] [place] [address]  [id number]  : Braulio Cortes MD, Phone:  [phone]    POC Glucose Fingerstick [[id number]]  (Abnormal) Collected:  01/27/17 1149    Specimen:  Blood Updated:  01/27/17 1152     Glucose 389 (H) mg/dL     Narrative:       Meter: [id number] : [id number] Akira Wood    Vitamin D 1,25 Dihydroxy [[id number]] Collected:  01/25/17 0358    Specimen:  Blood Updated:  01/27/17 1311     1,25-Dihydroxy, Vitamin D 76.5 pg/mL     Narrative:       Performed at:  [address]  [id number]  : Braulio Cortes MD, Phone:  [phone]    POC Glucose  Fingerstick [[id number]]  (Abnormal) Collected:  01/27/17 1731    Specimen:  Blood Updated:  01/27/17 1742     Glucose 290 (H) mg/dL     Narrative:       Meter: [id number] : [id number] Akira Nina    POC Glucose Fingerstick [[id number]]  (Abnormal) Collected:  01/27/17 2105    Specimen:  Blood Updated:  01/27/17 2113     Glucose 390 (H) mg/dL     Narrative:       Meter: [id number] : [id number] Emily Spears    Insulin, Random [[id number]] Collected:  01/25/17 0358    Specimen:  Blood Updated:  01/27/17 2122     Insulin 18 uIU/mL       Reference Range:  Pubertal Children and Adults (fasting):  < or = 17       Narrative:       Performed at:  [address] [place] [address] Endocrinology  [address] [place] [address]  [id number]  : Austin Capellan MD, Phone:  [phone]    CBC Auto Differential [[id number]]  (Abnormal) Collected:  01/28/17 0108    Specimen:  Blood Updated:  01/28/17 0148     WBC 19.36 (H) 10*3/mm3      RBC 4.59 (L) 10*6/mm3      Hemoglobin 13.7 g/dL      Hematocrit 39.8 (L) %      MCV 86.7 fL      MCH 29.8 pg      MCHC 34.4 g/dL      RDW 13.0 %      RDW-SD 41.2 fl      MPV 11.2 fL      Platelets 286 10*3/mm3      Neutrophil % 72.8 %      Lymphocyte % 16.3 (L) %      Monocyte % 9.8 %      Eosinophil % 0.1 (L) %      Basophil % 0.1 %      Immature Grans % 0.9 (H) %      Neutrophils, Absolute 14.12 (H) 10*3/mm3      Lymphocytes, Absolute 3.15 10*3/mm3      Monocytes, Absolute 1.90 (H) 10*3/mm3      Eosinophils, Absolute 0.01 10*3/mm3      Basophils, Absolute 0.01 10*3/mm3      Immature Grans, Absolute 0.17 (H) 10*3/mm3     CBC & Differential [[id number]] Collected:  01/28/17 0108    Specimen:  Blood Updated:  01/28/17 0148    Narrative:       The following orders were created for panel order CBC & Differential.  Procedure                               Abnormality         Status                     ---------                               -----------         ------                     Scan  "Slide[[id number]]                                                                   CBC Auto Differential[[id number]]         Abnormal            Final result                 Please view results for these tests on the individual orders.    Magnesium [[id number]]  (Normal) Collected:  01/28/17 0108    Specimen:  Blood Updated:  01/28/17 0200     Magnesium 2.0 mg/dL     Vancomycin, Trough Vancomycin Dose due at 1:30. Please check with nurse prior to obtaining trough. Do not obtain trough if Vancomycin is infusing. [[id number]]  (Normal) Collected:  01/28/17 0108    Specimen:  Blood Updated:  01/28/17 0204     Vancomycin Trough 15.00 mcg/mL     Comprehensive Metabolic Panel [[id number]]  (Abnormal) Collected:  01/28/17 0108    Specimen:  Blood Updated:  01/28/17 0205     Glucose 339 (H) mg/dL      BUN 22 (H) mg/dL      Creatinine 0.86 mg/dL      Sodium 135 (L) mmol/L      Potassium 4.2 mmol/L      Chloride 96 (L) mmol/L      CO2 23.8 mmol/L      Calcium 9.2 mg/dL      Total Protein 6.3 g/dL      Albumin 3.20 (L) g/dL      ALT (SGPT) 10 U/L      AST (SGOT) 7 U/L      Alkaline Phosphatase 76 U/L      Total Bilirubin 0.2 mg/dL      eGFR Non African Amer 97 mL/min/1.73      Globulin 3.1 gm/dL      A/G Ratio 1.0 g/dL      BUN/Creatinine Ratio 25.6 (H)      Anion Gap 15.2 mmol/L     POC Glucose Fingerstick [[id number]]  (Abnormal) Collected:  01/28/17 0556    Specimen:  Blood Updated:  01/28/17 0557     Glucose 277 (H) mg/dL     Narrative:       Meter: [id number] : [id number] Emily Spears    Wound Culture [[id number]]  (Abnormal)  (Susceptibility) Collected:  01/26/17 0405    Specimen:  Wound from Head Updated:  01/28/17 1052     Wound Culture Light growth (2+) Staphylococcus aureus (A)       D test is positive. Isolate exhibits \"inducible\" resistance to Clindamycin.          Gram Stain Result Few (2+) WBCs seen              Few (2+) Gram positive cocci in pairs, chains and clusters    Susceptibility      Staphylococcus aureus     PAM "     Clindamycin Resistant     Erythromycin >=8 ug/ml Resistant     Oxacillin 0.5 ug/ml Susceptible     Penicillin G >=0.5 ug/ml Resistant     Rifampin <=0.5 ug/ml Susceptible     Tetracycline <=1 ug/ml Susceptible     Trimethoprim + Sulfamethoxazole <=10 ug/ml Susceptible     Vancomycin 1 ug/ml Susceptible                    Wound Culture [[id number]]  (Abnormal) Collected:  01/26/17 1112    Specimen:  Wound from Scalp Updated:  01/28/17 1053     Wound Culture Scant growth (1+) Staphylococcus aureus (A)       Refer to previous wound culture collected on 1/26 for sensitivities          Gram Stain Result Many (4+) WBCs seen              Moderate (3+) Gram positive cocci in clusters    POC Glucose Fingerstick [[id number]]  (Abnormal) Collected:  01/28/17 1107    Specimen:  Blood Updated:  01/28/17 1108     Glucose 310 (H) mg/dL     Narrative:       Meter: [id number] : [id number] Rubin Gonzalez    POC Glucose Fingerstick [[id number]]  (Abnormal) Collected:  01/28/17 1608    Specimen:  Blood Updated:  01/28/17 1609     Glucose 263 (H) mg/dL     Narrative:       Meter: [id number] : [id number] Patricia Lopez    POC Glucose Fingerstick [[id number]]  (Abnormal) Collected:  01/28/17 2032    Specimen:  Blood Updated:  01/28/17 2034     Glucose 277 (H) mg/dL     Narrative:       Meter: [id number] : [id number] Franklin Montes    CBC & Differential [[id number]] Collected:  01/29/17 0322    Specimen:  Blood Updated:  01/29/17 0401    Narrative:       The following orders were created for panel order CBC & Differential.  Procedure                               Abnormality         Status                     ---------                               -----------         ------                     CBC Auto Differential[[id number]]         Abnormal            Final result                 Please view results for these tests on the individual orders.    CBC Auto Differential [[id number]]  (Abnormal) Collected:  01/29/17 0322    Specimen:   Blood Updated:  01/29/17 0401     WBC 12.48 (H) 10*3/mm3      RBC 4.54 (L) 10*6/mm3      Hemoglobin 13.2 (L) g/dL      Hematocrit 40.7 %      MCV 89.6 fL      MCH 29.1 pg      MCHC 32.4 (L) g/dL      RDW 13.1 %      RDW-SD 42.8 fl      MPV 10.8 fL      Platelets 270 10*3/mm3      Neutrophil % 39.7 (L) %      Lymphocyte % 48.6 (H) %      Monocyte % 10.3 %      Eosinophil % 0.3 %      Basophil % 0.2 %      Immature Grans % 0.9 (H) %      Neutrophils, Absolute 4.96 10*3/mm3      Lymphocytes, Absolute 6.06 (H) 10*3/mm3      Monocytes, Absolute 1.29 (H) 10*3/mm3      Eosinophils, Absolute 0.04 10*3/mm3      Basophils, Absolute 0.02 10*3/mm3      Immature Grans, Absolute 0.11 (H) 10*3/mm3     Magnesium [[id number]]  (Normal) Collected:  01/29/17 0322    Specimen:  Blood Updated:  01/29/17 0412     Magnesium 1.7 mg/dL     Comprehensive Metabolic Panel [[id number]]  (Abnormal) Collected:  01/29/17 0322    Specimen:  Blood Updated:  01/29/17 0426     Glucose 305 (H) mg/dL      BUN 14 mg/dL      Creatinine 0.79 mg/dL      Sodium 136 mmol/L      Potassium 3.9 mmol/L      Chloride 98 mmol/L      CO2 25.9 mmol/L      Calcium 8.6 mg/dL      Total Protein 5.8 (L) g/dL      Albumin 2.90 (L) g/dL      ALT (SGPT) 17 U/L      AST (SGOT) 20 U/L      Alkaline Phosphatase 63 U/L      Total Bilirubin 0.2 mg/dL      eGFR Non African Amer 107 mL/min/1.73      Globulin 2.9 gm/dL      A/G Ratio 1.0 g/dL      BUN/Creatinine Ratio 17.7      Anion Gap 12.1 mmol/L     POC Glucose Fingerstick [[id number]]  (Abnormal) Collected:  01/29/17 0556    Specimen:  Blood Updated:  01/29/17 0608     Glucose 291 (H) mg/dL     Narrative:       Meter: [id number] : [id number] Elinor Wood    POC Glucose Fingerstick [[id number]]  (Abnormal) Collected:  01/29/17 1133    Specimen:  Blood Updated:  01/29/17 1137     Glucose 265 (H) mg/dL     Narrative:       Meter: [id number] : [id number] Rubin Gonzalez    POC Glucose Fingerstick [[id number]]  (Abnormal)  Collected:  01/29/17 1610    Specimen:  Blood Updated:  01/29/17 1611     Glucose 269 (H) mg/dL     Narrative:       Meter: [id number] : [id number] Rubin Lisa    POC Glucose Fingerstick [[id number]]  (Abnormal) Collected:  01/29/17 2107    Specimen:  Blood Updated:  01/29/17 2109     Glucose 286 (H) mg/dL     Narrative:       Meter: [id number] : [id number] Ar Rodriguez    Blood Culture [[id number]]  (Normal) Collected:  01/24/17 2316    Specimen:  Blood from Arm, Left Updated:  01/30/17 0001     Blood Culture No growth at 5 days     CBC Auto Differential [[id number]]  (Abnormal) Collected:  01/30/17 0353    Specimen:  Blood Updated:  01/30/17 0426     WBC 14.14 (H) 10*3/mm3      RBC 4.74 10*6/mm3      Hemoglobin 13.7 g/dL      Hematocrit 42.8 %      MCV 90.3 fL      MCH 28.9 pg      MCHC 32.0 (L) g/dL      RDW 12.8 %      RDW-SD 42.6 fl      MPV 10.6 fL      Platelets 276 10*3/mm3      Neutrophil % 48.7 %      Lymphocyte % 40.7 %      Monocyte % 8.5 %      Eosinophil % 0.6 %      Basophil % 0.2 %      Immature Grans % 1.3 (H) %      Neutrophils, Absolute 6.88 10*3/mm3      Lymphocytes, Absolute 5.75 (H) 10*3/mm3      Monocytes, Absolute 1.20 10*3/mm3      Eosinophils, Absolute 0.09 10*3/mm3      Basophils, Absolute 0.03 10*3/mm3      Immature Grans, Absolute 0.19 (H) 10*3/mm3     CBC & Differential [[id number]] Collected:  01/30/17 0353    Specimen:  Blood Updated:  01/30/17 0426    Narrative:       The following orders were created for panel order CBC & Differential.  Procedure                               Abnormality         Status                     ---------                               -----------         ------                     CBC Auto Differential[[id number]]         Abnormal            Final result                 Please view results for these tests on the individual orders.    Magnesium [[id number]]  (Normal) Collected:  01/30/17 0353    Specimen:  Blood Updated:  01/30/17 0453     Magnesium 1.7  mg/dL     Comprehensive Metabolic Panel [[id number]]  (Abnormal) Collected:  01/30/17 0353    Specimen:  Blood Updated:  01/30/17 0501     Glucose 240 (H) mg/dL      BUN 7 mg/dL      Creatinine 0.76 mg/dL      Sodium 139 mmol/L      Potassium 3.7 mmol/L      Chloride 97 (L) mmol/L      CO2 25.7 mmol/L      Calcium 8.8 mg/dL      Total Protein 5.8 (L) g/dL      Albumin 3.00 (L) g/dL      ALT (SGPT) 23 U/L      AST (SGOT) 16 U/L      Alkaline Phosphatase 64 U/L      Total Bilirubin 0.2 mg/dL      eGFR Non African Amer 111 mL/min/1.73      Globulin 2.8 gm/dL      A/G Ratio 1.1 g/dL      BUN/Creatinine Ratio 9.2      Anion Gap 16.3 mmol/L     POC Glucose Fingerstick [[id number]]  (Abnormal) Collected:  01/30/17 0604    Specimen:  Blood Updated:  01/30/17 0605     Glucose 220 (H) mg/dL     Narrative:       Meter: [id number] : [id number] Ar Rodriguez    Thyroglobulin [[id number]] Collected:  01/25/17 0358    Specimen:  Blood Updated:  01/30/17 0621     Thyroglobulin (TG-JOSE DANIEL) 10 ng/mL       Reference Range:  Pubertal Children  and Adults: <40  According to the National Academy of Clinical [employer]ochemistry,  the reference interval for Thyroglobulin (TG) should be  related to euthyroid patients and not for patients who  underwent thyroidectomy.  TG reference intervals for these  patients depend on the residual mass of the thyroid tissue  left after surgery.  Establishing a post-operative baseline  is recommended.  The assay quantitation limit is 2.0 ng/mL.       Narrative:       Performed at:  01 - [facility] Endocrinology  [address] [place] [address]  [id number]  : Austin Capellan MD, Phone:  [phone]    POC Glucose Fingerstick [[id number]]  (Abnormal) Collected:  01/30/17 1055    Specimen:  Blood Updated:  01/30/17 1056     Glucose 258 (H) mg/dL     Narrative:       Meter: [id number] : [id number] Ronnie Corral    POC Glucose Fingerstick [[id number]]  (Abnormal) Collected:  01/30/17 1706     Specimen:  Blood Updated:  01/30/17 1702     Glucose 229 (H) mg/dL     Narrative:       Meter: [id number] : [id number] Ronnie Corral    POC Glucose Fingerstick [[id number]]  (Abnormal) Collected:  01/30/17 2044    Specimen:  Blood Updated:  01/30/17 2047     Glucose 241 (H) mg/dL     Narrative:       Meter: [id number] : [id number] Arlin Sotelo    CBC & Differential [[id number]] Collected:  01/31/17 0352    Specimen:  Blood Updated:  01/31/17 0410    Narrative:       The following orders were created for panel order CBC & Differential.  Procedure                               Abnormality         Status                     ---------                               -----------         ------                     CBC Auto Differential[[id number]]         Abnormal            Final result                 Please view results for these tests on the individual orders.    CBC Auto Differential [[id number]]  (Abnormal) Collected:  01/31/17 0352    Specimen:  Blood Updated:  01/31/17 0410     WBC 13.92 (H) 10*3/mm3      RBC 4.71 10*6/mm3      Hemoglobin 13.8 g/dL      Hematocrit 41.0 %      MCV 87.0 fL      MCH 29.3 pg      MCHC 33.7 g/dL      RDW 12.7 %      RDW-SD 40.7 fl      MPV 10.6 fL      Platelets 255 10*3/mm3      Neutrophil % 51.1 %      Lymphocyte % 38.6 %      Monocyte % 7.8 %      Eosinophil % 1.3 %      Basophil % 0.1 %      Immature Grans % 1.1 (H) %      Neutrophils, Absolute 7.11 10*3/mm3      Lymphocytes, Absolute 5.37 (H) 10*3/mm3      Monocytes, Absolute 1.08 10*3/mm3      Eosinophils, Absolute 0.18 10*3/mm3      Basophils, Absolute 0.02 10*3/mm3      Immature Grans, Absolute 0.16 (H) 10*3/mm3     Magnesium [[id number]]  (Abnormal) Collected:  01/31/17 0352    Specimen:  Blood Updated:  01/31/17 0431     Magnesium 1.5 (L) mg/dL     Comprehensive Metabolic Panel [[id number]]  (Abnormal) Collected:  01/31/17 0352    Specimen:  Blood Updated:  01/31/17 0431     Glucose 236 (H) mg/dL      BUN 7 mg/dL      Creatinine  0.72 (L) mg/dL      Sodium 140 mmol/L      Potassium 3.2 (L) mmol/L      Chloride 101 mmol/L      CO2 26.3 mmol/L      Calcium 8.8 mg/dL      Total Protein 5.9 (L) g/dL      Albumin 3.20 (L) g/dL      ALT (SGPT) 19 U/L      AST (SGOT) 13 U/L      Alkaline Phosphatase 62 U/L      Total Bilirubin 0.2 mg/dL      eGFR Non African Amer 119 mL/min/1.73      Globulin 2.7 gm/dL      A/G Ratio 1.2 g/dL      BUN/Creatinine Ratio 9.7      Anion Gap 12.7 mmol/L     POC Glucose Fingerstick [[id number]]  (Abnormal) Collected:  01/31/17 0555    Specimen:  Blood Updated:  01/31/17 0559     Glucose 216 (H) mg/dL     Narrative:       Meter: [id number] : [id number] Arlin Sotelo    Anaerobic Culture [[id number]]  (Normal) Collected:  01/26/17 0405    Specimen:  Swab from Head Updated:  01/31/17 0914     Culture No anaerobes isolated at 5 days     POC Glucose Fingerstick [[id number]]  (Abnormal) Collected:  01/31/17 1109    Specimen:  Blood Updated:  01/31/17 1112     Glucose 215 (H) mg/dL     Narrative:       Meter: [id number] : [id number] Wisemanankush Hernandez    POC Glucose Fingerstick [[id number]]  (Abnormal) Collected:  01/31/17 1634    Specimen:  Blood Updated:  01/31/17 1637     Glucose 229 (H) mg/dL     Narrative:       Meter: [id number] : [id number] Audie Knott      RADIOGRAPHIC DATA:    TWO-VIEW CHEST:      HISTORY: Allergic reaction. Shortness of breath.      The lungs are well-expanded and clear and the heart and hilar structures are normal. There is no acute disease.      This report was finalized on 1/25/2017 9:47 PM by Dr. William Baldwin MD.     CT SCAN OF THE HEAD WITH AND WITHOUT CONTRAST, CT SCAN OF THE FACIAL BONES WITH CONTRAST, CT SCAN OF THE TEMPORAL BONES WITHOUT CONTRAST:      CLINICAL HISTORY: A large area of soft tissue swelling along the left side of the face and scalp. Evaluate for osteomyelitis. Sensation of pressure behind left eye.      CT scan of the head was obtained with 3 mm axial images before and  after the administration of IV contrast.      FINDINGS:      There is prominent scalp soft tissue swelling along the left lateral frontal, temporal, and parietal scalp. Prominent edema is identified in this region. This area of scalp soft tissue swelling measures approximately 2.2 x 8.1 cm in greatest axial dimensions. The underlying left frontal and parietal bones does appear somewhat sclerotic. These  findings could be due to reactive changes within the bone although the possibility of osteomyelitis could not be excluded. I recommend further  evaluation with a contrast-enhanced MRI of the brain.      Otherwise, the ventricles, sulci, and cisterns are age appropriate. There are no abnormal areas of contrast enhancement within the brain. The basal ganglia and thalami are unremarkable. The posterior fossa structures are within normal limits.      Note is made of some enlarged intraparotid lymph nodes on the left. The largest of these measures up to 1.1 cm in long axis.      IMPRESSION:      There is prominent soft tissues swelling along the left lateral frontal, temporal, parietal scalp with prominent edema noted in this region. Again, the soft tissue swelling measures up to 2.2 x 8.1 cm in greatest axial dimensions. The underlying left frontal and parietal bones do appear somewhat sclerotic and the findings may be due to reactive  changes within the bone although the possibility of osteomyelitis could not be excluded. I recommend further evaluation with a contrast-enhanced MRI of the brain.      CT scan of the maxillofacial region was obtained with 2 mm axial images after the administration of IV contrast.      FINDINGS:      Again noted is the prominent soft tissue swelling within the left lateral temporal, parietal, and to lesser extent frontal scalp. Again, this area of soft tissue swelling measures up to 2.2 x 8.1 cm in greatest axial dimensions. There are enlarged lymph nodes appreciated within the jugular  chains, submandibular regions, and posterior cervical triangles. Yet other enlarged lymph nodes are identified within the periparotid regions and within the parotid glands. The largest of these periparotid lymph nodes is seen on the left measuring up to approximately 1.5 cm in long axis. The largest intraparotid lymph node is seen within the superficial lobe of left parotid gland measuring up to approximately 1.1 cm in long axis. Largest jugular chain lymph node is seen on the left measuring up to approximately 1.6 cm in long axis.      There is no post septal abnormality within the left orbit. There may be a mild degree of left preseptal soft tissue swelling. Both globes, extraocular muscles, optic nerves, and retrobulbar fat are all within normal limits.      IMPRESSION:      Prominent soft tissue swelling along the left lateral temporal, parietal, and to lesser extent frontal scalp. There may be slight left preseptal soft tissue swelling. Otherwise, no post septal abnormality is noted within the left orbit.      Multiple enlarged lymph nodes are identified within the posterior cervical triangles, jugular chains, periparotid regions, submandibular regions, and within the parotid glands.      CT scan of the temporal bones is obtained with 0.5 mm axial images. Coronal and sagittal reconstructed images were obtained.      FINDINGS:      The mastoid air cells and middle ear cavities are clear. There is slight thickening of the left tympanic membrane. The right tympanic membrane is unremarkable in appearance. The ossicular chains are within normal limits. The internal auditory canals, cochlea aqueducts, vestibular aqueducts and membranous labyrinths are within normal limits.      IMPRESSION:       There is slight thickening of the left tympanic membrane. Otherwise, CT scan of the temporal bones is unremarkable.    HOSPITAL COURSE:    The patient was admitted due to left lateral fascial cellulitis along with left  temporal carbuncle.  Dr. Carlos Wadsworth was consulted on for plastic surgery whose assessment and plan included:  Aspiration of abscess:  Skin prepped with alcohol, 18 G. Needle inserted through site of previous drainage.  Thick creamy purulent drainage (2 ml.) obtained and sent for gram stain and C&S.  Wound covered by 4 X 4 gauze.  Adjust antibiotics based on wound culture.  Patient states that site of infection is better and he does not want additional pain medicine.  This was done every day until the patient went home.  Patient did improved and discharge home.    VITAL SIGNS;  Temp:  [96.9 °F (36.1 °C)-97.7 °F (36.5 °C)] 97.7 °F (36.5 °C)  Heart Rate:  [53-60] 60  Resp:  [16] 16  BP: (117-135)/(73-81) 135/81    PHYSICAL EXAMINATIONS:    VITAL SIGNS:  TMax  97.7  NJ  60  RR  16  B/P  135/81  BMI  43.5.    GENERAL:  Fairly nourish.  Fairly developed.  Morbidly obese.  Fair Affect.    SKIN:  Not diaphoretic.  Fair skin turgor.  Not jaundice.  Decreased erythema of the left facial, left orbital, left temporal, and left ear.    HEENMT:  Normocephalic/Atraumatic.  Pinkish palpebral conjunctiva.  Anicteric sclerae.  PERRLA.  EOMI.  Oral mucosal membrane are moist.  Pharynx is not red.  Positive large carbuncle on the left temporal area decrease in size, has packing, and bandaged.    NECK:  Supple.  No JVD.  No bruits.    THORAX AND LUNGS:  Clear to auscultation without wheezing, rhonchi, nor rales.  No tenderness upon deep palpation of the costochondral junction.    CARDIOVASCULAR SYSTEM:  Regular rate and rhythm, no murmur.  Normal S1 / S2.  No S3 / S4.  No heaves.    ABDOMEN:  No Hepatosplenomegaly.  Soft.  Not tender.  Not distended.  Positive bowel sounds.    EXTREMITIES:  No cyanosis, clubbing, nor edema.  No calf tenderness.    CNS:  Alert.  Oriented x 3.  Cranial nerves are intact.  Sensory / Motor are intact.    Results Review:    I reviewed the patient's new clinical results.  Discussed with  patient.    1/31/2017  6:08 PM    Discharge Instructions    1)  D/C home.    2)  Follow up with the other physicians.    3)  D/C IV lines, IV fluids, IV medications.    4)  See Medication Sheet for home medications.    5)  Follow up with Dr. Rosenberg A. Reyes in 2 weeks.    6)  D/C Lovenox, Xopenex, Magnesium sulfate, Novolog sliding scale, Potassium protocol, Ambien, and Phenergan.     Graham Meléndez   Home Medication Instructions JACIEL:[id number]    Printed on:02/01/17 0111   Medication Information                      acetaminophen (TYLENOL) 325 MG tablet  Take 1 tablet by mouth Every 4 (Four) Hours As Needed for mild pain (1-3).             Atorvastatin Calcium (LIPITOR PO)  Take  by mouth.             cephalexin (KEFLEX) 500 MG capsule  Take 1 capsule by mouth 4 (Four) Times a Day for 10 days.             dakin's (HYSEPT) 0.25 % solution topical solution  Apply  topically Daily. Saturate 2 X 2 gauze with Dakins solution and re-pack wound daily, by VNA             HYDROcodone-acetaminophen (NORCO) 5-325 MG per tablet  Take 1 tablet by mouth Every 6 (Six) Hours As Needed for moderate pain (4-6).             irbesartan (AVAPRO) 300 MG tablet  Take 1 tablet by mouth Every Night.             Liraglutide 18 MG/3ML solution pen-injector  Inject 1.8 mg under the skin Daily.             metFORMIN (GLUCOPHAGE) 1000 MG tablet  Take 1,000 mg by mouth 2 (Two) Times a Day With Meals.             nebivolol (BYSTOLIC) 5 MG tablet  Take 1 tablet by mouth Daily.             NIFEdipine XL (PROCARDIA XL) 90 MG 24 hr tablet  Take 1 tablet by mouth Daily.             pantoprazole (PROTONIX) 40 MG EC tablet  Take 1 tablet by mouth Daily.             pioglitazone (ACTOS) 30 MG tablet  Take 1 tablet by mouth Daily.             SITagliptin (JANUVIA) 100 MG tablet  Take 100 mg by mouth Daily.             UNKNOWN TO PATIENT  A BLOOD PRESSURE MEDICATION             vitamin D (ERGOCALCIFEROL) 44603 UNITS capsule capsule  Take 1 capsule by  mouth Every 7 (Seven) Days.               DISCHARGE MEDICATIONS:    1)  Actos 30 mg 1 tab PO Q Daily.    2)  Lipitor 20 mg 1 tab PO Q Daily.    3)  Bystolic 5 mg 1 tab PO Q Daily.    4)  Avapro 300 mg 1 tab PO Q Daily.    5)  Protonix 40 mg 1 tab PO Q Daily.    6)  Victoza 1.2 mg inject SQ Q Daily.    7)  Januvia 100 mg 1 tab PO Q Daily.    8)  Metformin 1000 mg 1 tab PO BID.    9)  Procardia XL 90 mg 1 tab PO Q Daily.    10)  Vitamin D 50,000 units 1 tab PO Q Weekly.    I discussed the patients findings and my recommendations with patient.     Rosenberg Acosta Reyes, MD  02/01/17  1:11 AM    Time: Discharge 60. min

## 2018-04-06 ENCOUNTER — TRANSCRIBE ORDERS (OUTPATIENT)
Dept: ADMINISTRATIVE | Facility: HOSPITAL | Age: 48
End: 2018-04-06

## 2018-04-06 DIAGNOSIS — L02.11 ABSCESS, NECK: Primary | ICD-10-CM

## 2018-04-10 ENCOUNTER — OFFICE VISIT (OUTPATIENT)
Dept: SURGERY | Facility: CLINIC | Age: 48
End: 2018-04-10

## 2018-04-10 ENCOUNTER — HOSPITAL ENCOUNTER (OUTPATIENT)
Dept: GENERAL RADIOLOGY | Facility: HOSPITAL | Age: 48
Discharge: HOME | End: 2018-04-10
Attending: FAMILY MEDICINE | Admitting: FAMILY MEDICINE

## 2018-04-10 VITALS
WEIGHT: 310.5 LBS | SYSTOLIC BLOOD PRESSURE: 110 MMHG | HEART RATE: 83 BPM | BODY MASS INDEX: 42.06 KG/M2 | HEIGHT: 72 IN | OXYGEN SATURATION: 98 % | DIASTOLIC BLOOD PRESSURE: 70 MMHG

## 2018-04-10 VITALS
OXYGEN SATURATION: 96 % | HEIGHT: 72 IN | RESPIRATION RATE: 16 BRPM | TEMPERATURE: 97.4 F | DIASTOLIC BLOOD PRESSURE: 62 MMHG | SYSTOLIC BLOOD PRESSURE: 98 MMHG | WEIGHT: 304 LBS | BODY MASS INDEX: 41.17 KG/M2 | HEART RATE: 88 BPM

## 2018-04-10 DIAGNOSIS — L02.11 ABSCESS, NECK: ICD-10-CM

## 2018-04-10 DIAGNOSIS — L02.91 ABSCESS: Primary | ICD-10-CM

## 2018-04-10 PROCEDURE — 99242 OFF/OP CONSLTJ NEW/EST SF 20: CPT | Performed by: SURGERY

## 2018-04-10 PROCEDURE — C1751 CATH, INF, PER/CENT/MIDLINE: HCPCS

## 2018-04-10 PROCEDURE — 77001 FLUOROGUIDE FOR VEIN DEVICE: CPT

## 2018-04-10 PROCEDURE — 76937 US GUIDE VASCULAR ACCESS: CPT

## 2018-04-10 RX ORDER — LIDOCAINE HYDROCHLORIDE 10 MG/ML
10 INJECTION, SOLUTION INFILTRATION; PERINEURAL ONCE
Status: COMPLETED | OUTPATIENT
Start: 2018-04-10 | End: 2018-04-10

## 2018-04-10 RX ADMIN — LIDOCAINE HYDROCHLORIDE 10 ML: 10 INJECTION, SOLUTION INFILTRATION; PERINEURAL at 10:10

## 2018-04-10 NOTE — NURSING NOTE
Patient states [facility] will take care of the antibiotic infusion.  He is seeing Dr. Rabago in the office today for his neck infection.

## 2018-04-10 NOTE — NURSING NOTE
Patient returned with PICC inserted right upper arm.  Dressing is dry and intact with Bio-Patch in place.  Dressing over PICC is a transparent dressing that is dry and intact.  PICC education given to patient including not to get it wet, make sure dressing is always dry and intact with a Bio-Patch, and PICC card and order given to patient to give to his VNA nurse.  He was instructed to always wear the sleeve on the PICC to protect the pigtail and keep it from getting pulled out.  He was told to call his [facility]NA RN if any problems and for his dressing changes. He was D/[place] via ambulation with his parents.

## 2018-04-10 NOTE — H&P
his          Name: Graham Meléndez ADMIT: 4/10/2018   : 1970  PCP: Rosenberg Acosta Reyes, MD    MRN: [id number] LOS: 0 days   AGE/SEX: 47 y.o. male  ROOM: Room/bed info not found       Chief complaint   Needs picc line.     Past Surgical History:  Past Surgical History:   Procedure Laterality Date   • DENTAL PROCEDURE Bilateral     top and bottom tooth extraction-all   • GROIN ABSCESS INCISION AND DRAINAGE Right        Past Medical History:  Past Medical History:   Diagnosis Date   • Allergic rhinitis    • Diabetes mellitus    • GERD (gastroesophageal reflux disease)    • Hyperlipidemia    • Hypertension    • Low back pain potentially associated with radiculopathy    • Myofascial pain    • Sleep apnea     cpap        Home Medications:    (Not in a hospital admission)    Allergies:  Review of patient's allergies indicates no known allergies.    Family History:  Family History   Problem Relation Age of Onset   • Hypertension Mother    • ZOEY disease Mother    • Hypertension Father    • Hyperlipidemia Father    • Diabetes Father    • Coronary artery disease Father    • Heart attack Father        Social History:  Social History   Substance Use Topics   • Smoking status: Current Every Day Smoker     Packs/day: 1.00     Years: 8.00   • Smokeless tobacco: Not on file   • Alcohol use Yes      Comment: fifth of Joe Geronimo twice a year        Objective     Physical Exam:  Right upper extremity normal    Vital Signs  Temp:  [97.4 °F (36.3 °C)] 97.4 °F (36.3 °C)  Heart Rate:  [88] 88  Resp:  [16] 16  BP: (98)/(62) 98/62    Anticipated Surgical Procedure:  picc    The risks, benefits and alternatives of this procedure have been discussed with the patient or responsible party: Yes        Ryder Hernandez MD  04/10/18  10:21 AM

## 2018-04-13 PROBLEM — L02.91 ABSCESS: Status: ACTIVE | Noted: 2018-04-13

## 2018-04-13 NOTE — PROGRESS NOTES
Subjective   Graham Meléndez is a 47 y.o. male who presents to the office in surgical consultation from Rosenberg Acosta Reyes, MD for a left neck abscess.    History of Present Illness     The patient has a history of abscesses thought to be related to bacteremia.  He recently developed a left neck abscess and underwent an incision and drainage of the abscess.  He has had ongoing wound care with packing and presents to our office for evaluation.    Review of Systems   Constitutional: Negative for activity change, appetite change, fatigue and fever.   HENT: Negative for trouble swallowing and voice change.    Respiratory: Negative for chest tightness and shortness of breath.    Cardiovascular: Negative for chest pain and palpitations.   Gastrointestinal: Negative for abdominal pain, blood in stool, constipation, diarrhea, nausea and vomiting.   Endocrine: Negative for cold intolerance and heat intolerance.   Genitourinary: Negative for dysuria and flank pain.   Neurological: Negative for dizziness and light-headedness.   Hematological: Negative for adenopathy. Does not bruise/bleed easily.   Psychiatric/Behavioral: Negative for agitation and confusion.     Past Medical History:   Diagnosis Date   • Abscess    • Allergic rhinitis    • Diabetes mellitus    • GERD (gastroesophageal reflux disease)    • Hyperlipidemia    • Hypertension    • Low back pain potentially associated with radiculopathy    • Myofascial pain    • Sleep apnea     cpap      Past Surgical History:   Procedure Laterality Date   • DENTAL PROCEDURE Bilateral 2000    top and bottom tooth extraction-all   • GROIN ABSCESS INCISION AND DRAINAGE Right 2012     Family History   Problem Relation Age of Onset   • Hypertension Mother    • ZOEY disease Mother    • Hypertension Father    • Hyperlipidemia Father    • Diabetes Father    • Coronary artery disease Father    • Heart attack Father      Social History     Social History   • Marital status: Single      Spouse name: N/A   • Number of children: 0   • Years of education: 11th grade     Occupational History   •       Social History Main Topics   • Smoking status: Current Every Day Smoker     Packs/day: 1.00     Years: 8.00   • Smokeless tobacco: Never Used   • Alcohol use Yes      Comment: fifth of Joe Geronimo twice a year   • Drug use: No   • Sexual activity: Not Currently     Partners: Female     Other Topics Concern   • Not on file     Social History Narrative    He does eat fair.  He does exercise.  He smokes 1 to 2 packs per day since he was 38 years old and quit at 45 years old.  However, he has started smoking again but now only smokes 1 pack per day.  He drinks a fifth of Joe Crowder twice a year.  He denies illicit drug abuse.  He has had an eleventh grade education.  He works as a .  He remains single.  He lives in an apartment with his parents.  He has no pets.  He has good family support.  He is not sexually active.  He has no child.  There is no dysfunction at home.  There are no weapons, violence, nor abuse at home.       Objective   Physical Exam   Constitutional: He is oriented to person, place, and time. He appears well-developed and well-nourished.  Non-toxic appearance.   Eyes: EOM are normal. No scleral icterus.   Pulmonary/Chest: Effort normal. No respiratory distress.   Abdominal: Normal appearance.   Neurological: He is alert and oriented to person, place, and time.   Skin: Skin is warm and dry.   There is an erythematous lesion of the left neck with a small opening consistent with a drained abscess.  There is no residual abscess present.   Psychiatric: He has a normal mood and affect. His behavior is normal. Judgment and thought content normal.       Assessment/Plan       The encounter diagnosis was Abscess.    The patient has an abscess of the left neck and has already undergone an incision and drainage of the abscess.  No further treatment is necessary at this time.   Local wound care was discussed with him.  He will follow-up on an as-needed basis.

## 2018-12-30 ENCOUNTER — HOSPITAL ENCOUNTER (INPATIENT)
Facility: HOSPITAL | Age: 48
LOS: 4 days | Discharge: HOME | End: 2019-01-03
Attending: EMERGENCY MEDICINE | Admitting: FAMILY MEDICINE

## 2018-12-30 ENCOUNTER — APPOINTMENT (OUTPATIENT)
Dept: CT IMAGING | Facility: HOSPITAL | Age: 48
End: 2018-12-30

## 2018-12-30 DIAGNOSIS — L02.01 FACIAL ABSCESS: ICD-10-CM

## 2018-12-30 DIAGNOSIS — E11.65 UNCONTROLLED TYPE 2 DIABETES MELLITUS WITH HYPERGLYCEMIA (HCC): Primary | ICD-10-CM

## 2018-12-30 DIAGNOSIS — L03.211 FACIAL CELLULITIS: ICD-10-CM

## 2018-12-30 LAB
ALBUMIN SERPL-MCNC: 4 G/DL (ref 3.5–5.2)
ALBUMIN/GLOB SERPL: 1.1 G/DL
ALP SERPL-CCNC: 113 U/L (ref 39–117)
ALT SERPL W P-5'-P-CCNC: 21 U/L (ref 1–41)
ANION GAP SERPL CALCULATED.3IONS-SCNC: 18.6 MMOL/L
AST SERPL-CCNC: 15 U/L (ref 1–40)
B-OH-BUTYR SERPL-SCNC: 0.53 MMOL/L (ref 0.02–0.27)
BASOPHILS # BLD AUTO: 0.02 10*3/MM3 (ref 0–0.2)
BASOPHILS NFR BLD AUTO: 0.1 % (ref 0–1.5)
BILIRUB SERPL-MCNC: 0.4 MG/DL (ref 0.1–1.2)
BILIRUB UR QL STRIP: NEGATIVE
BUN BLD-MCNC: 25 MG/DL (ref 6–20)
BUN/CREAT SERPL: 19.5 (ref 7–25)
CALCIUM SPEC-SCNC: 10.1 MG/DL (ref 8.6–10.5)
CHLORIDE SERPL-SCNC: 83 MMOL/L (ref 98–107)
CLARITY UR: CLEAR
CO2 SERPL-SCNC: 22.4 MMOL/L (ref 22–29)
COLOR UR: YELLOW
CREAT BLD-MCNC: 1.28 MG/DL (ref 0.76–1.27)
D-LACTATE SERPL-SCNC: 4.2 MMOL/L (ref 0.5–2)
DEPRECATED RDW RBC AUTO: 38 FL (ref 37–54)
EOSINOPHIL # BLD AUTO: 0.04 10*3/MM3 (ref 0–0.7)
EOSINOPHIL NFR BLD AUTO: 0.2 % (ref 0.3–6.2)
ERYTHROCYTE [DISTWIDTH] IN BLOOD BY AUTOMATED COUNT: 12.6 % (ref 11.5–14.5)
GFR SERPL CREATININE-BSD FRML MDRD: 61 ML/MIN/1.73
GLOBULIN UR ELPH-MCNC: 3.8 GM/DL
GLUCOSE BLD-MCNC: 747 MG/DL (ref 65–99)
GLUCOSE BLDC GLUCOMTR-MCNC: 483 MG/DL (ref 70–130)
GLUCOSE UR STRIP-MCNC: ABNORMAL MG/DL
HCT VFR BLD AUTO: 46.6 % (ref 40.4–52.2)
HGB BLD-MCNC: 16.8 G/DL (ref 13.7–17.6)
HGB UR QL STRIP.AUTO: NEGATIVE
HOLD SPECIMEN: NORMAL
IMM GRANULOCYTES # BLD AUTO: 0.09 10*3/MM3 (ref 0–0.03)
IMM GRANULOCYTES NFR BLD AUTO: 0.6 % (ref 0–0.5)
KETONES UR QL STRIP: ABNORMAL
LEUKOCYTE ESTERASE UR QL STRIP.AUTO: NEGATIVE
LYMPHOCYTES # BLD AUTO: 4.16 10*3/MM3 (ref 0.9–4.8)
LYMPHOCYTES NFR BLD AUTO: 26 % (ref 19.6–45.3)
MAGNESIUM SERPL-MCNC: 2 MG/DL (ref 1.6–2.6)
MCH RBC QN AUTO: 30.1 PG (ref 27–32.7)
MCHC RBC AUTO-ENTMCNC: 36.1 G/DL (ref 32.6–36.4)
MCV RBC AUTO: 83.4 FL (ref 79.8–96.2)
MONOCYTES # BLD AUTO: 1.61 10*3/MM3 (ref 0.2–1.2)
MONOCYTES NFR BLD AUTO: 10.1 % (ref 5–12)
NEUTROPHILS # BLD AUTO: 10.18 10*3/MM3 (ref 1.9–8.1)
NEUTROPHILS NFR BLD AUTO: 63.6 % (ref 42.7–76)
NITRITE UR QL STRIP: NEGATIVE
PH UR STRIP.AUTO: 5.5 [PH] (ref 5–8)
PHOSPHATE SERPL-MCNC: 4.6 MG/DL (ref 2.5–4.5)
PLATELET # BLD AUTO: 236 10*3/MM3 (ref 140–500)
PMV BLD AUTO: 12 FL (ref 6–12)
POTASSIUM BLD-SCNC: 5.3 MMOL/L (ref 3.5–5.2)
PROT SERPL-MCNC: 7.8 G/DL (ref 6–8.5)
PROT UR QL STRIP: NEGATIVE
RBC # BLD AUTO: 5.59 10*6/MM3 (ref 4.6–6)
SODIUM BLD-SCNC: 124 MMOL/L (ref 136–145)
SP GR UR STRIP: >=1.03 (ref 1–1.03)
UROBILINOGEN UR QL STRIP: ABNORMAL
WBC NRBC COR # BLD: 16.01 10*3/MM3 (ref 4.5–10.7)
WHOLE BLOOD HOLD SPECIMEN: NORMAL
WHOLE BLOOD HOLD SPECIMEN: NORMAL

## 2018-12-30 PROCEDURE — 81003 URINALYSIS AUTO W/O SCOPE: CPT | Performed by: EMERGENCY MEDICINE

## 2018-12-30 PROCEDURE — 93005 ELECTROCARDIOGRAM TRACING: CPT

## 2018-12-30 PROCEDURE — 63710000001 INSULIN REGULAR HUMAN PER 5 UNITS: Performed by: EMERGENCY MEDICINE

## 2018-12-30 PROCEDURE — 83735 ASSAY OF MAGNESIUM: CPT | Performed by: EMERGENCY MEDICINE

## 2018-12-30 PROCEDURE — 93010 ELECTROCARDIOGRAM REPORT: CPT | Performed by: INTERNAL MEDICINE

## 2018-12-30 PROCEDURE — 80053 COMPREHEN METABOLIC PANEL: CPT | Performed by: EMERGENCY MEDICINE

## 2018-12-30 PROCEDURE — 25010000002 HYDROMORPHONE PER 4 MG: Performed by: EMERGENCY MEDICINE

## 2018-12-30 PROCEDURE — 25010000002 VANCOMYCIN 10 G RECONSTITUTED SOLUTION: Performed by: EMERGENCY MEDICINE

## 2018-12-30 PROCEDURE — 84100 ASSAY OF PHOSPHORUS: CPT | Performed by: EMERGENCY MEDICINE

## 2018-12-30 PROCEDURE — 87040 BLOOD CULTURE FOR BACTERIA: CPT | Performed by: EMERGENCY MEDICINE

## 2018-12-30 PROCEDURE — 83605 ASSAY OF LACTIC ACID: CPT | Performed by: EMERGENCY MEDICINE

## 2018-12-30 PROCEDURE — 82010 KETONE BODYS QUAN: CPT | Performed by: EMERGENCY MEDICINE

## 2018-12-30 PROCEDURE — 25010000002 ONDANSETRON PER 1 MG: Performed by: EMERGENCY MEDICINE

## 2018-12-30 PROCEDURE — 99285 EMERGENCY DEPT VISIT HI MDM: CPT

## 2018-12-30 PROCEDURE — 70487 CT MAXILLOFACIAL W/DYE: CPT

## 2018-12-30 PROCEDURE — 82962 GLUCOSE BLOOD TEST: CPT

## 2018-12-30 PROCEDURE — 85025 COMPLETE CBC W/AUTO DIFF WBC: CPT | Performed by: EMERGENCY MEDICINE

## 2018-12-30 PROCEDURE — 25010000002 PIPERACILLIN SOD-TAZOBACTAM PER 1 G: Performed by: EMERGENCY MEDICINE

## 2018-12-30 PROCEDURE — 25010000002 IOPAMIDOL 61 % SOLUTION: Performed by: EMERGENCY MEDICINE

## 2018-12-30 PROCEDURE — 93005 ELECTROCARDIOGRAM TRACING: CPT | Performed by: EMERGENCY MEDICINE

## 2018-12-30 PROCEDURE — 25010000002 HYDROMORPHONE 1 MG/ML SOLUTION: Performed by: EMERGENCY MEDICINE

## 2018-12-30 RX ORDER — MELATONIN
5000 DAILY
Status: DISCONTINUED | OUTPATIENT
Start: 2018-12-31 | End: 2019-01-03 | Stop reason: HOSPADM

## 2018-12-30 RX ORDER — ATORVASTATIN CALCIUM 20 MG/1
20 TABLET, FILM COATED ORAL DAILY
Status: DISCONTINUED | OUTPATIENT
Start: 2018-12-31 | End: 2019-01-03 | Stop reason: HOSPADM

## 2018-12-30 RX ORDER — ONDANSETRON 2 MG/ML
4 INJECTION INTRAMUSCULAR; INTRAVENOUS ONCE
Status: COMPLETED | OUTPATIENT
Start: 2018-12-30 | End: 2018-12-30

## 2018-12-30 RX ORDER — AMLODIPINE BESYLATE 5 MG/1
10 TABLET ORAL DAILY
Status: DISCONTINUED | OUTPATIENT
Start: 2018-12-31 | End: 2018-12-30

## 2018-12-30 RX ORDER — MAGNESIUM SULFATE HEPTAHYDRATE 40 MG/ML
2 INJECTION, SOLUTION INTRAVENOUS AS NEEDED
Status: DISCONTINUED | OUTPATIENT
Start: 2018-12-30 | End: 2019-01-03 | Stop reason: HOSPADM

## 2018-12-30 RX ORDER — ACETAMINOPHEN 325 MG/1
325 TABLET ORAL EVERY 4 HOURS PRN
Status: DISCONTINUED | OUTPATIENT
Start: 2018-12-30 | End: 2019-01-03 | Stop reason: HOSPADM

## 2018-12-30 RX ORDER — FENOFIBRATE 145 MG/1
145 TABLET, COATED ORAL DAILY
Status: DISCONTINUED | OUTPATIENT
Start: 2018-12-31 | End: 2019-01-03 | Stop reason: HOSPADM

## 2018-12-30 RX ORDER — 3% SODIUM CHLORIDE 3 G/100ML
60 INJECTION, SOLUTION INTRAVENOUS ONCE
Status: COMPLETED | OUTPATIENT
Start: 2018-12-30 | End: 2018-12-31

## 2018-12-30 RX ORDER — SODIUM CHLORIDE 0.9 % (FLUSH) 0.9 %
3-10 SYRINGE (ML) INJECTION AS NEEDED
Status: DISCONTINUED | OUTPATIENT
Start: 2018-12-30 | End: 2019-01-03 | Stop reason: HOSPADM

## 2018-12-30 RX ORDER — POTASSIUM CHLORIDE 7.45 MG/ML
10 INJECTION INTRAVENOUS
Status: DISCONTINUED | OUTPATIENT
Start: 2018-12-30 | End: 2019-01-03 | Stop reason: HOSPADM

## 2018-12-30 RX ORDER — SODIUM CHLORIDE 0.9 % (FLUSH) 0.9 %
3 SYRINGE (ML) INJECTION EVERY 12 HOURS SCHEDULED
Status: DISCONTINUED | OUTPATIENT
Start: 2018-12-30 | End: 2019-01-03 | Stop reason: HOSPADM

## 2018-12-30 RX ORDER — VALSARTAN 320 MG/1
320 TABLET ORAL
Status: DISCONTINUED | OUTPATIENT
Start: 2018-12-31 | End: 2018-12-30

## 2018-12-30 RX ORDER — PIOGLITAZONEHYDROCHLORIDE 30 MG/1
30 TABLET ORAL DAILY
Status: DISCONTINUED | OUTPATIENT
Start: 2018-12-31 | End: 2019-01-03 | Stop reason: HOSPADM

## 2018-12-30 RX ORDER — OLMESARTAN MEDOXOMIL 40 MG/1
40 TABLET ORAL DAILY
Status: DISCONTINUED | OUTPATIENT
Start: 2018-12-31 | End: 2019-01-03 | Stop reason: HOSPADM

## 2018-12-30 RX ORDER — AMLODIPINE BESYLATE 10 MG/1
10 TABLET ORAL DAILY
COMMUNITY
End: 2019-01-03 | Stop reason: HOSPADM

## 2018-12-30 RX ORDER — ACETAMINOPHEN 325 MG/1
650 TABLET ORAL EVERY 4 HOURS PRN
Status: DISCONTINUED | OUTPATIENT
Start: 2018-12-30 | End: 2019-01-03 | Stop reason: HOSPADM

## 2018-12-30 RX ORDER — ZOLPIDEM TARTRATE 5 MG/1
5 TABLET ORAL NIGHTLY PRN
Status: DISCONTINUED | OUTPATIENT
Start: 2018-12-30 | End: 2019-01-03 | Stop reason: HOSPADM

## 2018-12-30 RX ORDER — POTASSIUM CHLORIDE 1.5 G/1.77G
40 POWDER, FOR SOLUTION ORAL AS NEEDED
Status: DISCONTINUED | OUTPATIENT
Start: 2018-12-30 | End: 2019-01-03 | Stop reason: HOSPADM

## 2018-12-30 RX ORDER — POTASSIUM CHLORIDE 750 MG/1
40 CAPSULE, EXTENDED RELEASE ORAL AS NEEDED
Status: DISCONTINUED | OUTPATIENT
Start: 2018-12-30 | End: 2019-01-03 | Stop reason: HOSPADM

## 2018-12-30 RX ORDER — OLMESARTAN MEDOXOMIL 20 MG/1
20 TABLET ORAL DAILY
COMMUNITY
End: 2019-01-03 | Stop reason: HOSPADM

## 2018-12-30 RX ORDER — HYDROMORPHONE HYDROCHLORIDE 1 MG/ML
0.5 INJECTION, SOLUTION INTRAMUSCULAR; INTRAVENOUS; SUBCUTANEOUS ONCE
Status: COMPLETED | OUTPATIENT
Start: 2018-12-30 | End: 2018-12-30

## 2018-12-30 RX ORDER — NEBIVOLOL 5 MG/1
5 TABLET ORAL DAILY
Status: DISCONTINUED | OUTPATIENT
Start: 2018-12-31 | End: 2019-01-03 | Stop reason: HOSPADM

## 2018-12-30 RX ORDER — NICOTINE 21 MG/24HR
1 PATCH, TRANSDERMAL 24 HOURS TRANSDERMAL
Status: DISCONTINUED | OUTPATIENT
Start: 2018-12-31 | End: 2019-01-03

## 2018-12-30 RX ORDER — PANTOPRAZOLE SODIUM 40 MG/1
40 TABLET, DELAYED RELEASE ORAL EVERY MORNING
Status: DISCONTINUED | OUTPATIENT
Start: 2018-12-31 | End: 2019-01-03 | Stop reason: HOSPADM

## 2018-12-30 RX ADMIN — SODIUM CHLORIDE 1000 ML: 9 INJECTION, SOLUTION INTRAVENOUS at 19:31

## 2018-12-30 RX ADMIN — INSULIN HUMAN 10 UNITS: 100 INJECTION, SOLUTION PARENTERAL at 19:40

## 2018-12-30 RX ADMIN — SODIUM CHLORIDE 2259 ML: 9 INJECTION, SOLUTION INTRAVENOUS at 21:05

## 2018-12-30 RX ADMIN — IOPAMIDOL 75 ML: 612 INJECTION, SOLUTION INTRAVENOUS at 20:21

## 2018-12-30 RX ADMIN — HYDROMORPHONE HYDROCHLORIDE 0.5 MG: 1 INJECTION, SOLUTION INTRAMUSCULAR; INTRAVENOUS; SUBCUTANEOUS at 22:17

## 2018-12-30 RX ADMIN — VANCOMYCIN HYDROCHLORIDE 2750 MG: 10 INJECTION, POWDER, LYOPHILIZED, FOR SOLUTION INTRAVENOUS at 21:01

## 2018-12-30 RX ADMIN — TAZOBACTAM SODIUM AND PIPERACILLIN SODIUM 4.5 G: 500; 4 INJECTION, SOLUTION INTRAVENOUS at 19:42

## 2018-12-30 RX ADMIN — HYDROMORPHONE HYDROCHLORIDE 1 MG: 1 INJECTION, SOLUTION INTRAMUSCULAR; INTRAVENOUS; SUBCUTANEOUS at 21:15

## 2018-12-30 RX ADMIN — ONDANSETRON 4 MG: 2 INJECTION INTRAMUSCULAR; INTRAVENOUS at 21:14

## 2018-12-31 ENCOUNTER — APPOINTMENT (OUTPATIENT)
Dept: CT IMAGING | Facility: HOSPITAL | Age: 48
End: 2018-12-31

## 2018-12-31 PROBLEM — R81 GLUCOSURIA: Status: ACTIVE | Noted: 2018-12-31

## 2018-12-31 PROBLEM — E11.65 UNCONTROLLED TYPE 2 DIABETES MELLITUS WITH HYPERGLYCEMIA (HCC): Status: ACTIVE | Noted: 2018-12-31

## 2018-12-31 PROBLEM — K12.2 CELLULITIS OF MOUTH: Status: ACTIVE | Noted: 2018-12-30

## 2018-12-31 PROBLEM — E87.1 HYPONATREMIA: Status: ACTIVE | Noted: 2018-12-31

## 2018-12-31 PROBLEM — R79.89 AZOTEMIA: Status: ACTIVE | Noted: 2018-12-31

## 2018-12-31 PROBLEM — K12.2 CELLULITIS OF MOUTH: Status: ACTIVE | Noted: 2018-12-31

## 2018-12-31 PROBLEM — R73.9 HYPERGLYCEMIA: Status: ACTIVE | Noted: 2018-12-31

## 2018-12-31 PROBLEM — E86.0 DEHYDRATION: Status: ACTIVE | Noted: 2018-12-31

## 2018-12-31 PROBLEM — R09.89 LABILE HYPERTENSION: Status: ACTIVE | Noted: 2018-12-31

## 2018-12-31 PROBLEM — E87.5 HYPERKALEMIA: Status: ACTIVE | Noted: 2018-12-31

## 2018-12-31 LAB
ABO GROUP BLD: NORMAL
ALBUMIN SERPL-MCNC: 3.4 G/DL (ref 3.5–5.2)
ALBUMIN/GLOB SERPL: 1 G/DL
ALP SERPL-CCNC: 95 U/L (ref 39–117)
ALT SERPL W P-5'-P-CCNC: 17 U/L (ref 1–41)
AMYLASE SERPL-CCNC: 22 U/L (ref 28–100)
ANION GAP SERPL CALCULATED.3IONS-SCNC: 15.2 MMOL/L
ARTERIAL PATENCY WRIST A: POSITIVE
ARTICHOKE IGE QN: 73 MG/DL (ref 0–100)
AST SERPL-CCNC: 14 U/L (ref 1–40)
ATMOSPHERIC PRESS: 748.3 MMHG
BASE EXCESS BLDA CALC-SCNC: 1.4 MMOL/L (ref 0–2)
BASOPHILS # BLD AUTO: 0.03 10*3/MM3 (ref 0–0.2)
BASOPHILS NFR BLD AUTO: 0.2 % (ref 0–1.5)
BDY SITE: ABNORMAL
BILIRUB CONJ SERPL-MCNC: <0.2 MG/DL (ref 0–0.3)
BILIRUB INDIRECT SERPL-MCNC: NORMAL MG/DL
BILIRUB SERPL-MCNC: 0.2 MG/DL (ref 0.1–1.2)
BILIRUB SERPL-MCNC: 0.3 MG/DL (ref 0.1–1.2)
BLD GP AB SCN SERPL QL: NEGATIVE
BUN BLD-MCNC: 17 MG/DL (ref 6–20)
BUN/CREAT SERPL: 21.8 (ref 7–25)
CALCIUM SPEC-SCNC: 8.7 MG/DL (ref 8.6–10.5)
CHLORIDE SERPL-SCNC: 91 MMOL/L (ref 98–107)
CHOLEST SERPL-MCNC: 195 MG/DL (ref 0–200)
CHROMATIN AB SERPL-ACNC: <10 IU/ML (ref 0–14)
CK SERPL-CCNC: 43 U/L (ref 20–200)
CO2 SERPL-SCNC: 23.8 MMOL/L (ref 22–29)
CREAT BLD-MCNC: 0.78 MG/DL (ref 0.76–1.27)
CRP SERPL-MCNC: 0.97 MG/DL (ref 0.01–0.5)
CRP SERPL-MCNC: 1.25 MG/DL (ref 0–0.5)
D-LACTATE SERPL-SCNC: 1.3 MMOL/L (ref 0.5–2)
DEPRECATED RDW RBC AUTO: 40.6 FL (ref 37–54)
EOSINOPHIL # BLD AUTO: 0.14 10*3/MM3 (ref 0–0.7)
EOSINOPHIL NFR BLD AUTO: 0.7 % (ref 0.3–6.2)
ERYTHROCYTE [DISTWIDTH] IN BLOOD BY AUTOMATED COUNT: 12.8 % (ref 11.5–14.5)
ERYTHROCYTE [SEDIMENTATION RATE] IN BLOOD: 14 MM/HR (ref 0–15)
GFR SERPL CREATININE-BSD FRML MDRD: 107 ML/MIN/1.73
GGT SERPL-CCNC: 58 U/L (ref 8–61)
GLOBULIN UR ELPH-MCNC: 3.4 GM/DL
GLUCOSE BLD-MCNC: 396 MG/DL (ref 65–99)
GLUCOSE BLDC GLUCOMTR-MCNC: 310 MG/DL (ref 70–130)
GLUCOSE BLDC GLUCOMTR-MCNC: 323 MG/DL (ref 70–130)
GLUCOSE BLDC GLUCOMTR-MCNC: 384 MG/DL (ref 70–130)
GLUCOSE BLDC GLUCOMTR-MCNC: 401 MG/DL (ref 70–130)
HBA1C MFR BLD: 15.7 % (ref 4.8–5.6)
HCO3 BLDA-SCNC: 27.8 MMOL/L (ref 22–28)
HCT VFR BLD AUTO: 44 % (ref 40.4–52.2)
HDLC SERPL-MCNC: 24 MG/DL (ref 40–60)
HGB BLD-MCNC: 15.1 G/DL (ref 13.7–17.6)
IMM GRANULOCYTES # BLD AUTO: 0.07 10*3/MM3 (ref 0–0.03)
IMM GRANULOCYTES NFR BLD AUTO: 0.4 % (ref 0–0.5)
LDLC SERPL CALC-MCNC: ABNORMAL MG/DL (ref 0–100)
LDLC/HDLC SERPL: ABNORMAL {RATIO}
LIPASE SERPL-CCNC: 27 U/L (ref 13–60)
LYMPHOCYTES # BLD AUTO: 6.27 10*3/MM3 (ref 0.9–4.8)
LYMPHOCYTES NFR BLD AUTO: 32.2 % (ref 19.6–45.3)
MAGNESIUM SERPL-MCNC: 2 MG/DL (ref 1.6–2.6)
MCH RBC QN AUTO: 30.4 PG (ref 27–32.7)
MCHC RBC AUTO-ENTMCNC: 34.3 G/DL (ref 32.6–36.4)
MCV RBC AUTO: 88.5 FL (ref 79.8–96.2)
MODALITY: ABNORMAL
MONOCYTES # BLD AUTO: 1.63 10*3/MM3 (ref 0.2–1.2)
MONOCYTES NFR BLD AUTO: 8.4 % (ref 5–12)
NEUTROPHILS # BLD AUTO: 11.36 10*3/MM3 (ref 1.9–8.1)
NEUTROPHILS NFR BLD AUTO: 58.1 % (ref 42.7–76)
NT-PROBNP SERPL-MCNC: 36.2 PG/ML (ref 5–450)
PCO2 BLDA: 48.9 MM HG (ref 35–45)
PH BLDA: 7.36 PH UNITS (ref 7.35–7.45)
PLATELET # BLD AUTO: 220 10*3/MM3 (ref 140–500)
PMV BLD AUTO: 11.2 FL (ref 6–12)
PO2 BLDA: 82.8 MM HG (ref 80–100)
POTASSIUM BLD-SCNC: 3.9 MMOL/L (ref 3.5–5.2)
PROT SERPL-MCNC: 6.8 G/DL (ref 6–8.5)
RBC # BLD AUTO: 4.97 10*6/MM3 (ref 4.6–6)
RH BLD: POSITIVE
SAO2 % BLDCOA: 95.5 % (ref 92–99)
SET MECH RESP RATE: 18
SODIUM BLD-SCNC: 130 MMOL/L (ref 136–145)
T&S EXPIRATION DATE: NORMAL
T4 FREE SERPL-MCNC: 1.16 NG/DL (ref 0.93–1.7)
TRIGL SERPL-MCNC: 1155 MG/DL (ref 0–150)
TROPONIN T SERPL-MCNC: <0.01 NG/ML (ref 0–0.03)
TSH SERPL DL<=0.05 MIU/L-ACNC: 4.74 MIU/ML (ref 0.27–4.2)
URATE SERPL-MCNC: 4.7 MG/DL (ref 3.4–7)
VANCOMYCIN TROUGH SERPL-MCNC: 9.3 MCG/ML (ref 5–20)
VLDLC SERPL-MCNC: ABNORMAL MG/DL (ref 5–40)
WBC NRBC COR # BLD: 19.5 10*3/MM3 (ref 4.5–10.7)

## 2018-12-31 PROCEDURE — 36415 COLL VENOUS BLD VENIPUNCTURE: CPT | Performed by: FAMILY MEDICINE

## 2018-12-31 PROCEDURE — 82150 ASSAY OF AMYLASE: CPT | Performed by: FAMILY MEDICINE

## 2018-12-31 PROCEDURE — 25010000002 VANCOMYCIN 10 G RECONSTITUTED SOLUTION: Performed by: FAMILY MEDICINE

## 2018-12-31 PROCEDURE — 80061 LIPID PANEL: CPT | Performed by: FAMILY MEDICINE

## 2018-12-31 PROCEDURE — 84443 ASSAY THYROID STIM HORMONE: CPT | Performed by: FAMILY MEDICINE

## 2018-12-31 PROCEDURE — 86376 MICROSOMAL ANTIBODY EACH: CPT | Performed by: FAMILY MEDICINE

## 2018-12-31 PROCEDURE — 84484 ASSAY OF TROPONIN QUANT: CPT | Performed by: FAMILY MEDICINE

## 2018-12-31 PROCEDURE — 86140 C-REACTIVE PROTEIN: CPT | Performed by: FAMILY MEDICINE

## 2018-12-31 PROCEDURE — 82962 GLUCOSE BLOOD TEST: CPT

## 2018-12-31 PROCEDURE — 86901 BLOOD TYPING SEROLOGIC RH(D): CPT | Performed by: FAMILY MEDICINE

## 2018-12-31 PROCEDURE — 82550 ASSAY OF CK (CPK): CPT | Performed by: FAMILY MEDICINE

## 2018-12-31 PROCEDURE — 83605 ASSAY OF LACTIC ACID: CPT | Performed by: EMERGENCY MEDICINE

## 2018-12-31 PROCEDURE — 84681 ASSAY OF C-PEPTIDE: CPT | Performed by: FAMILY MEDICINE

## 2018-12-31 PROCEDURE — 77012 CT SCAN FOR NEEDLE BIOPSY: CPT

## 2018-12-31 PROCEDURE — 86850 RBC ANTIBODY SCREEN: CPT | Performed by: FAMILY MEDICINE

## 2018-12-31 PROCEDURE — 83880 ASSAY OF NATRIURETIC PEPTIDE: CPT | Performed by: FAMILY MEDICINE

## 2018-12-31 PROCEDURE — 83525 ASSAY OF INSULIN: CPT | Performed by: FAMILY MEDICINE

## 2018-12-31 PROCEDURE — 80053 COMPREHEN METABOLIC PANEL: CPT | Performed by: FAMILY MEDICINE

## 2018-12-31 PROCEDURE — 83735 ASSAY OF MAGNESIUM: CPT | Performed by: FAMILY MEDICINE

## 2018-12-31 PROCEDURE — 87070 CULTURE OTHR SPECIMN AEROBIC: CPT | Performed by: SPECIALIST

## 2018-12-31 PROCEDURE — 36600 WITHDRAWAL OF ARTERIAL BLOOD: CPT

## 2018-12-31 PROCEDURE — 25010000002 VANCOMYCIN: Performed by: FAMILY MEDICINE

## 2018-12-31 PROCEDURE — 84439 ASSAY OF FREE THYROXINE: CPT | Performed by: FAMILY MEDICINE

## 2018-12-31 PROCEDURE — 82248 BILIRUBIN DIRECT: CPT | Performed by: FAMILY MEDICINE

## 2018-12-31 PROCEDURE — 85025 COMPLETE CBC W/AUTO DIFF WBC: CPT | Performed by: FAMILY MEDICINE

## 2018-12-31 PROCEDURE — 25010000002 ENOXAPARIN PER 10 MG: Performed by: FAMILY MEDICINE

## 2018-12-31 PROCEDURE — 83036 HEMOGLOBIN GLYCOSYLATED A1C: CPT | Performed by: FAMILY MEDICINE

## 2018-12-31 PROCEDURE — 87205 SMEAR GRAM STAIN: CPT | Performed by: SPECIALIST

## 2018-12-31 PROCEDURE — 25010000002 HYDROMORPHONE 1 MG/ML SOLUTION: Performed by: FAMILY MEDICINE

## 2018-12-31 PROCEDURE — 83690 ASSAY OF LIPASE: CPT | Performed by: FAMILY MEDICINE

## 2018-12-31 PROCEDURE — 0C903ZZ DRAINAGE OF UPPER LIP, PERCUTANEOUS APPROACH: ICD-10-PCS | Performed by: RADIOLOGY

## 2018-12-31 PROCEDURE — 86431 RHEUMATOID FACTOR QUANT: CPT | Performed by: FAMILY MEDICINE

## 2018-12-31 PROCEDURE — 87015 SPECIMEN INFECT AGNT CONCNTJ: CPT | Performed by: SPECIALIST

## 2018-12-31 PROCEDURE — 80202 ASSAY OF VANCOMYCIN: CPT | Performed by: FAMILY MEDICINE

## 2018-12-31 PROCEDURE — 83721 ASSAY OF BLOOD LIPOPROTEIN: CPT | Performed by: FAMILY MEDICINE

## 2018-12-31 PROCEDURE — 87147 CULTURE TYPE IMMUNOLOGIC: CPT | Performed by: SPECIALIST

## 2018-12-31 PROCEDURE — 82977 ASSAY OF GGT: CPT | Performed by: FAMILY MEDICINE

## 2018-12-31 PROCEDURE — 86900 BLOOD TYPING SEROLOGIC ABO: CPT | Performed by: FAMILY MEDICINE

## 2018-12-31 PROCEDURE — 82247 BILIRUBIN TOTAL: CPT | Performed by: FAMILY MEDICINE

## 2018-12-31 PROCEDURE — 86141 C-REACTIVE PROTEIN HS: CPT | Performed by: FAMILY MEDICINE

## 2018-12-31 PROCEDURE — 84550 ASSAY OF BLOOD/URIC ACID: CPT | Performed by: FAMILY MEDICINE

## 2018-12-31 PROCEDURE — 85652 RBC SED RATE AUTOMATED: CPT | Performed by: FAMILY MEDICINE

## 2018-12-31 PROCEDURE — 63710000001 INSULIN LISPRO (HUMAN) PER 5 UNITS: Performed by: FAMILY MEDICINE

## 2018-12-31 PROCEDURE — 84432 ASSAY OF THYROGLOBULIN: CPT | Performed by: FAMILY MEDICINE

## 2018-12-31 PROCEDURE — 86038 ANTINUCLEAR ANTIBODIES: CPT | Performed by: FAMILY MEDICINE

## 2018-12-31 PROCEDURE — 82803 BLOOD GASES ANY COMBINATION: CPT

## 2018-12-31 RX ORDER — SODIUM CHLORIDE 0.9 % (FLUSH) 0.9 %
10 SYRINGE (ML) INJECTION EVERY 12 HOURS SCHEDULED
Status: DISCONTINUED | OUTPATIENT
Start: 2018-12-31 | End: 2019-01-03 | Stop reason: HOSPADM

## 2018-12-31 RX ORDER — LIDOCAINE HYDROCHLORIDE 10 MG/ML
20 INJECTION, SOLUTION INFILTRATION; PERINEURAL ONCE
Status: COMPLETED | OUTPATIENT
Start: 2018-12-31 | End: 2018-12-31

## 2018-12-31 RX ORDER — 3% SODIUM CHLORIDE 3 G/100ML
60 INJECTION, SOLUTION INTRAVENOUS ONCE
Status: DISCONTINUED | OUTPATIENT
Start: 2018-12-31 | End: 2019-01-03 | Stop reason: HOSPADM

## 2018-12-31 RX ORDER — SODIUM CHLORIDE 0.9 % (FLUSH) 0.9 %
3 SYRINGE (ML) INJECTION EVERY 12 HOURS SCHEDULED
Status: DISCONTINUED | OUTPATIENT
Start: 2018-12-31 | End: 2019-01-03 | Stop reason: HOSPADM

## 2018-12-31 RX ORDER — SODIUM CHLORIDE 0.9 % (FLUSH) 0.9 %
10 SYRINGE (ML) INJECTION AS NEEDED
Status: DISCONTINUED | OUTPATIENT
Start: 2018-12-31 | End: 2019-01-03 | Stop reason: HOSPADM

## 2018-12-31 RX ORDER — SODIUM CHLORIDE 0.9 % (FLUSH) 0.9 %
1-10 SYRINGE (ML) INJECTION AS NEEDED
Status: DISCONTINUED | OUTPATIENT
Start: 2018-12-31 | End: 2019-01-03 | Stop reason: HOSPADM

## 2018-12-31 RX ORDER — DIAZEPAM 5 MG/1
10 TABLET ORAL ONCE
Status: COMPLETED | OUTPATIENT
Start: 2018-12-31 | End: 2018-12-31

## 2018-12-31 RX ORDER — SODIUM CHLORIDE 0.9 % (FLUSH) 0.9 %
20 SYRINGE (ML) INJECTION AS NEEDED
Status: DISCONTINUED | OUTPATIENT
Start: 2018-12-31 | End: 2019-01-03 | Stop reason: HOSPADM

## 2018-12-31 RX ADMIN — VITAMIN D, TAB 1000IU (100/BT) 5000 UNITS: 25 TAB at 09:00

## 2018-12-31 RX ADMIN — VANCOMYCIN HYDROCHLORIDE 1500 MG: 10 INJECTION, POWDER, LYOPHILIZED, FOR SOLUTION INTRAVENOUS at 13:10

## 2018-12-31 RX ADMIN — NEBIVOLOL HYDROCHLORIDE 5 MG: 5 TABLET ORAL at 09:00

## 2018-12-31 RX ADMIN — VANCOMYCIN HYDROCHLORIDE 1500 MG: 10 INJECTION, POWDER, LYOPHILIZED, FOR SOLUTION INTRAVENOUS at 20:36

## 2018-12-31 RX ADMIN — Medication 3 ML: at 08:15

## 2018-12-31 RX ADMIN — PANTOPRAZOLE SODIUM 40 MG: 40 TABLET, DELAYED RELEASE ORAL at 06:06

## 2018-12-31 RX ADMIN — Medication 3 ML: at 03:14

## 2018-12-31 RX ADMIN — ENOXAPARIN SODIUM 40 MG: 40 INJECTION SUBCUTANEOUS at 14:38

## 2018-12-31 RX ADMIN — FENOFIBRATE 145 MG: 145 TABLET ORAL at 09:00

## 2018-12-31 RX ADMIN — LINAGLIPTIN 5 MG: 5 TABLET, FILM COATED ORAL at 09:00

## 2018-12-31 RX ADMIN — SODIUM CHLORIDE 60 ML/HR: 3 INJECTION, SOLUTION INTRAVENOUS at 04:33

## 2018-12-31 RX ADMIN — ATORVASTATIN CALCIUM 20 MG: 20 TABLET, FILM COATED ORAL at 09:00

## 2018-12-31 RX ADMIN — ZOLPIDEM TARTRATE 5 MG: 5 TABLET, FILM COATED ORAL at 03:59

## 2018-12-31 RX ADMIN — PIOGLITAZONE 30 MG: 30 TABLET ORAL at 09:00

## 2018-12-31 RX ADMIN — ZOLPIDEM TARTRATE 5 MG: 5 TABLET, FILM COATED ORAL at 20:42

## 2018-12-31 RX ADMIN — HYDROMORPHONE HYDROCHLORIDE 1 MG: 1 INJECTION, SOLUTION INTRAMUSCULAR; INTRAVENOUS; SUBCUTANEOUS at 22:15

## 2018-12-31 RX ADMIN — DIAZEPAM 10 MG: 5 TABLET ORAL at 15:55

## 2018-12-31 RX ADMIN — HYDROMORPHONE HYDROCHLORIDE 1 MG: 1 INJECTION, SOLUTION INTRAMUSCULAR; INTRAVENOUS; SUBCUTANEOUS at 03:09

## 2018-12-31 RX ADMIN — ENOXAPARIN SODIUM 40 MG: 40 INJECTION SUBCUTANEOUS at 03:09

## 2018-12-31 RX ADMIN — ENOXAPARIN SODIUM 40 MG: 40 INJECTION SUBCUTANEOUS at 22:33

## 2018-12-31 RX ADMIN — HYDROMORPHONE HYDROCHLORIDE 1 MG: 1 INJECTION, SOLUTION INTRAMUSCULAR; INTRAVENOUS; SUBCUTANEOUS at 17:19

## 2018-12-31 RX ADMIN — VANCOMYCIN HYDROCHLORIDE 1500 MG: 10 INJECTION, POWDER, LYOPHILIZED, FOR SOLUTION INTRAVENOUS at 04:33

## 2018-12-31 RX ADMIN — NIFEDIPINE 90 MG: 60 TABLET, FILM COATED, EXTENDED RELEASE ORAL at 09:00

## 2018-12-31 RX ADMIN — HYDROMORPHONE HYDROCHLORIDE 1 MG: 1 INJECTION, SOLUTION INTRAMUSCULAR; INTRAVENOUS; SUBCUTANEOUS at 08:45

## 2018-12-31 RX ADMIN — INSULIN LISPRO 13 UNITS: 100 INJECTION, SOLUTION INTRAVENOUS; SUBCUTANEOUS at 09:00

## 2018-12-31 RX ADMIN — INSULIN LISPRO 10 UNITS: 100 INJECTION, SOLUTION INTRAVENOUS; SUBCUTANEOUS at 13:13

## 2018-12-31 RX ADMIN — HYDROMORPHONE HYDROCHLORIDE 1 MG: 1 INJECTION, SOLUTION INTRAMUSCULAR; INTRAVENOUS; SUBCUTANEOUS at 12:55

## 2018-12-31 RX ADMIN — LIDOCAINE HYDROCHLORIDE 20 ML: 10 INJECTION, SOLUTION INFILTRATION; PERINEURAL at 16:35

## 2019-01-01 ENCOUNTER — APPOINTMENT (OUTPATIENT)
Dept: CARDIOLOGY | Facility: HOSPITAL | Age: 49
End: 2019-01-01
Attending: FAMILY MEDICINE

## 2019-01-01 ENCOUNTER — APPOINTMENT (OUTPATIENT)
Dept: MRI IMAGING | Facility: HOSPITAL | Age: 49
End: 2019-01-01

## 2019-01-01 LAB
ALBUMIN SERPL-MCNC: 3.1 G/DL (ref 3.5–5.2)
ALBUMIN/GLOB SERPL: 0.9 G/DL
ALP SERPL-CCNC: 94 U/L (ref 39–117)
ALT SERPL W P-5'-P-CCNC: 17 U/L (ref 1–41)
ANION GAP SERPL CALCULATED.3IONS-SCNC: 12.1 MMOL/L
AORTIC DIMENSIONLESS INDEX: 0.8 (DI)
AST SERPL-CCNC: 16 U/L (ref 1–40)
BASOPHILS # BLD AUTO: 0.03 10*3/MM3 (ref 0–0.2)
BASOPHILS NFR BLD AUTO: 0.2 % (ref 0–1.5)
BH CV ECHO MEAS - ACS: 1.7 CM
BH CV ECHO MEAS - AO MAX PG: 5.8 MMHG
BH CV ECHO MEAS - AO MEAN PG (FULL): 1 MMHG
BH CV ECHO MEAS - AO MEAN PG: 3 MMHG
BH CV ECHO MEAS - AO ROOT AREA (BSA CORRECTED): 1.3
BH CV ECHO MEAS - AO ROOT AREA: 8.6 CM^2
BH CV ECHO MEAS - AO ROOT DIAM: 3.3 CM
BH CV ECHO MEAS - AO V2 MAX: 120 CM/SEC
BH CV ECHO MEAS - AO V2 MEAN: 84.1 CM/SEC
BH CV ECHO MEAS - AO V2 VTI: 19.4 CM
BH CV ECHO MEAS - AVA(I,A): 4 CM^2
BH CV ECHO MEAS - AVA(I,D): 4 CM^2
BH CV ECHO MEAS - BSA(HAYCOCK): 2.7 M^2
BH CV ECHO MEAS - BSA: 2.5 M^2
BH CV ECHO MEAS - BZI_BMI: 42 KILOGRAMS/M^2
BH CV ECHO MEAS - BZI_METRIC_HEIGHT: 180.3 CM
BH CV ECHO MEAS - BZI_METRIC_WEIGHT: 136.5 KG
BH CV ECHO MEAS - EDV(CUBED): 91.1 ML
BH CV ECHO MEAS - EDV(MOD-SP2): 97 ML
BH CV ECHO MEAS - EDV(MOD-SP4): 103 ML
BH CV ECHO MEAS - EDV(TEICH): 92.4 ML
BH CV ECHO MEAS - EF(CUBED): 56.9 %
BH CV ECHO MEAS - EF(MOD-BP): 70 %
BH CV ECHO MEAS - EF(MOD-SP2): 68 %
BH CV ECHO MEAS - EF(MOD-SP4): 70.9 %
BH CV ECHO MEAS - EF(TEICH): 48.7 %
BH CV ECHO MEAS - ESV(CUBED): 39.3 ML
BH CV ECHO MEAS - ESV(MOD-SP2): 31 ML
BH CV ECHO MEAS - ESV(MOD-SP4): 30 ML
BH CV ECHO MEAS - ESV(TEICH): 47.4 ML
BH CV ECHO MEAS - FS: 24.4 %
BH CV ECHO MEAS - IVS/LVPW: 1
BH CV ECHO MEAS - IVSD: 1.2 CM
BH CV ECHO MEAS - LAT PEAK E' VEL: 13 CM/SEC
BH CV ECHO MEAS - LV DIASTOLIC VOL/BSA (35-75): 41.1 ML/M^2
BH CV ECHO MEAS - LV MASS(C)D: 198.1 GRAMS
BH CV ECHO MEAS - LV MASS(C)DI: 79 GRAMS/M^2
BH CV ECHO MEAS - LV MEAN PG: 2 MMHG
BH CV ECHO MEAS - LV SYSTOLIC VOL/BSA (12-30): 12 ML/M^2
BH CV ECHO MEAS - LV V1 MAX: 90 CM/SEC
BH CV ECHO MEAS - LV V1 MEAN: 61.1 CM/SEC
BH CV ECHO MEAS - LV V1 VTI: 15.9 CM
BH CV ECHO MEAS - LVIDD: 4.5 CM
BH CV ECHO MEAS - LVIDS: 3.4 CM
BH CV ECHO MEAS - LVLD AP2: 8.3 CM
BH CV ECHO MEAS - LVLD AP4: 7.9 CM
BH CV ECHO MEAS - LVLS AP2: 6.7 CM
BH CV ECHO MEAS - LVLS AP4: 6.4 CM
BH CV ECHO MEAS - LVOT AREA (M): 4.9 CM^2
BH CV ECHO MEAS - LVOT AREA: 4.9 CM^2
BH CV ECHO MEAS - LVOT DIAM: 2.5 CM
BH CV ECHO MEAS - LVPWD: 1.2 CM
BH CV ECHO MEAS - MED PEAK E' VEL: 8 CM/SEC
BH CV ECHO MEAS - MV A DUR: 0.11 SEC
BH CV ECHO MEAS - MV A MAX VEL: 43.4 CM/SEC
BH CV ECHO MEAS - MV DEC SLOPE: 422 CM/SEC^2
BH CV ECHO MEAS - MV DEC TIME: 0.16 SEC
BH CV ECHO MEAS - MV E MAX VEL: 70.1 CM/SEC
BH CV ECHO MEAS - MV E/A: 1.6
BH CV ECHO MEAS - MV MEAN PG: 1 MMHG
BH CV ECHO MEAS - MV P1/2T MAX VEL: 73.8 CM/SEC
BH CV ECHO MEAS - MV P1/2T: 51.2 MSEC
BH CV ECHO MEAS - MV V2 MEAN: 37.1 CM/SEC
BH CV ECHO MEAS - MV V2 VTI: 14.7 CM
BH CV ECHO MEAS - MVA P1/2T LCG: 3 CM^2
BH CV ECHO MEAS - MVA(P1/2T): 4.3 CM^2
BH CV ECHO MEAS - MVA(VTI): 5.3 CM^2
BH CV ECHO MEAS - PA ACC SLOPE: 6.9 CM/SEC^2
BH CV ECHO MEAS - PA ACC TIME: 0.1 SEC
BH CV ECHO MEAS - PA MAX PG (FULL): 0.78 MMHG
BH CV ECHO MEAS - PA MAX PG: 2.6 MMHG
BH CV ECHO MEAS - PA PR(ACCEL): 34.5 MMHG
BH CV ECHO MEAS - PA V2 MAX: 80.8 CM/SEC
BH CV ECHO MEAS - PULM A REVS DUR: 0.09 SEC
BH CV ECHO MEAS - PULM A REVS VEL: 26.2 CM/SEC
BH CV ECHO MEAS - PULM DIAS VEL: 47.8 CM/SEC
BH CV ECHO MEAS - PULM S/D: 0.85
BH CV ECHO MEAS - PULM SYS VEL: 40.5 CM/SEC
BH CV ECHO MEAS - PVA(V,A): 3.8 CM^2
BH CV ECHO MEAS - PVA(V,D): 3.8 CM^2
BH CV ECHO MEAS - QP/QS: 0.78
BH CV ECHO MEAS - RV MAX PG: 1.8 MMHG
BH CV ECHO MEAS - RV MEAN PG: 1 MMHG
BH CV ECHO MEAS - RV V1 MAX: 67.7 CM/SEC
BH CV ECHO MEAS - RV V1 MEAN: 51.3 CM/SEC
BH CV ECHO MEAS - RV V1 VTI: 13.5 CM
BH CV ECHO MEAS - RVOT AREA: 4.5 CM^2
BH CV ECHO MEAS - RVOT DIAM: 2.4 CM
BH CV ECHO MEAS - SI(AO): 66.1 ML/M^2
BH CV ECHO MEAS - SI(CUBED): 20.7 ML/M^2
BH CV ECHO MEAS - SI(LVOT): 31.1 ML/M^2
BH CV ECHO MEAS - SI(MOD-SP2): 26.3 ML/M^2
BH CV ECHO MEAS - SI(MOD-SP4): 29.1 ML/M^2
BH CV ECHO MEAS - SI(TEICH): 17.9 ML/M^2
BH CV ECHO MEAS - SV(AO): 165.9 ML
BH CV ECHO MEAS - SV(CUBED): 51.8 ML
BH CV ECHO MEAS - SV(LVOT): 78 ML
BH CV ECHO MEAS - SV(MOD-SP2): 66 ML
BH CV ECHO MEAS - SV(MOD-SP4): 73 ML
BH CV ECHO MEAS - SV(RVOT): 61.1 ML
BH CV ECHO MEAS - SV(TEICH): 45 ML
BH CV ECHO MEAS - TAPSE (>1.6): 2.7 CM2
BH CV ECHO MEASUREMENTS AVERAGE E/E' RATIO: 6.68
BH CV VAS BP RIGHT ARM: NORMAL MMHG
BH CV XLRA - RV BASE: 3.8 CM
BH CV XLRA - TDI S': 13.7 CM/SEC
BH CV XLRA MEAS LEFT CCA RATIO VEL: -130 CM/SEC
BH CV XLRA MEAS LEFT DIST CCA EDV: -25.1 CM/SEC
BH CV XLRA MEAS LEFT DIST CCA PSV: -130 CM/SEC
BH CV XLRA MEAS LEFT DIST ICA EDV: -27.8 CM/SEC
BH CV XLRA MEAS LEFT DIST ICA PSV: -74.4 CM/SEC
BH CV XLRA MEAS LEFT ICA RATIO VEL: -83.3 CM/SEC
BH CV XLRA MEAS LEFT ICA/CCA RATIO: 0.64
BH CV XLRA MEAS LEFT MID ICA EDV: -28.1 CM/SEC
BH CV XLRA MEAS LEFT MID ICA PSV: -73.3 CM/SEC
BH CV XLRA MEAS LEFT PROX CCA EDV: 22.6 CM/SEC
BH CV XLRA MEAS LEFT PROX CCA PSV: 147 CM/SEC
BH CV XLRA MEAS LEFT PROX ECA EDV: -16.5 CM/SEC
BH CV XLRA MEAS LEFT PROX ECA PSV: -123 CM/SEC
BH CV XLRA MEAS LEFT PROX ICA EDV: -20.5 CM/SEC
BH CV XLRA MEAS LEFT PROX ICA PSV: -83.3 CM/SEC
BH CV XLRA MEAS LEFT PROX SCLA PSV: 189 CM/SEC
BH CV XLRA MEAS LEFT VERTEBRAL A EDV: -8.6 CM/SEC
BH CV XLRA MEAS LEFT VERTEBRAL A PSV: -41.2 CM/SEC
BH CV XLRA MEAS RIGHT CCA RATIO VEL: -126 CM/SEC
BH CV XLRA MEAS RIGHT DIST CCA EDV: -20.6 CM/SEC
BH CV XLRA MEAS RIGHT DIST CCA PSV: -126 CM/SEC
BH CV XLRA MEAS RIGHT DIST ICA EDV: -14.3 CM/SEC
BH CV XLRA MEAS RIGHT DIST ICA PSV: -55.2 CM/SEC
BH CV XLRA MEAS RIGHT ICA RATIO VEL: -81.5 CM/SEC
BH CV XLRA MEAS RIGHT ICA/CCA RATIO: 0.65
BH CV XLRA MEAS RIGHT MID ICA EDV: -21.7 CM/SEC
BH CV XLRA MEAS RIGHT MID ICA PSV: -76.8 CM/SEC
BH CV XLRA MEAS RIGHT PROX CCA EDV: 18.7 CM/SEC
BH CV XLRA MEAS RIGHT PROX CCA PSV: 128 CM/SEC
BH CV XLRA MEAS RIGHT PROX ECA EDV: -13.4 CM/SEC
BH CV XLRA MEAS RIGHT PROX ECA PSV: -132 CM/SEC
BH CV XLRA MEAS RIGHT PROX ICA EDV: -21.7 CM/SEC
BH CV XLRA MEAS RIGHT PROX ICA PSV: -81.5 CM/SEC
BH CV XLRA MEAS RIGHT PROX SCLA PSV: 191 CM/SEC
BH CV XLRA MEAS RIGHT VERTEBRAL A EDV: -18.2 CM/SEC
BH CV XLRA MEAS RIGHT VERTEBRAL A PSV: -48.1 CM/SEC
BILIRUB SERPL-MCNC: 0.2 MG/DL (ref 0.1–1.2)
BUN BLD-MCNC: 11 MG/DL (ref 6–20)
BUN/CREAT SERPL: 15.3 (ref 7–25)
CALCIUM SPEC-SCNC: 8.4 MG/DL (ref 8.6–10.5)
CHLORIDE SERPL-SCNC: 96 MMOL/L (ref 98–107)
CO2 SERPL-SCNC: 22.9 MMOL/L (ref 22–29)
CREAT BLD-MCNC: 0.72 MG/DL (ref 0.76–1.27)
DEPRECATED RDW RBC AUTO: 42.1 FL (ref 37–54)
EOSINOPHIL # BLD AUTO: 0.2 10*3/MM3 (ref 0–0.7)
EOSINOPHIL NFR BLD AUTO: 1.5 % (ref 0.3–6.2)
ERYTHROCYTE [DISTWIDTH] IN BLOOD BY AUTOMATED COUNT: 12.8 % (ref 11.5–14.5)
GFR SERPL CREATININE-BSD FRML MDRD: 118 ML/MIN/1.73
GLOBULIN UR ELPH-MCNC: 3.3 GM/DL
GLUCOSE BLD-MCNC: 427 MG/DL (ref 65–99)
GLUCOSE BLDC GLUCOMTR-MCNC: 333 MG/DL (ref 70–130)
GLUCOSE BLDC GLUCOMTR-MCNC: 397 MG/DL (ref 70–130)
GLUCOSE BLDC GLUCOMTR-MCNC: 407 MG/DL (ref 70–130)
GLUCOSE BLDC GLUCOMTR-MCNC: 429 MG/DL (ref 70–130)
HCT VFR BLD AUTO: 44.4 % (ref 40.4–52.2)
HGB BLD-MCNC: 14.9 G/DL (ref 13.7–17.6)
IMM GRANULOCYTES # BLD AUTO: 0.03 10*3/MM3 (ref 0–0.03)
IMM GRANULOCYTES NFR BLD AUTO: 0.2 % (ref 0–0.5)
LEFT ARM BP: NORMAL MMHG
LEFT ATRIUM VOLUME INDEX: 16 ML/M2
LYMPHOCYTES # BLD AUTO: 5.03 10*3/MM3 (ref 0.9–4.8)
LYMPHOCYTES NFR BLD AUTO: 38.6 % (ref 19.6–45.3)
MAGNESIUM SERPL-MCNC: 2.3 MG/DL (ref 1.6–2.6)
MCH RBC QN AUTO: 30.4 PG (ref 27–32.7)
MCHC RBC AUTO-ENTMCNC: 33.6 G/DL (ref 32.6–36.4)
MCV RBC AUTO: 90.6 FL (ref 79.8–96.2)
MONOCYTES # BLD AUTO: 1.06 10*3/MM3 (ref 0.2–1.2)
MONOCYTES NFR BLD AUTO: 8.1 % (ref 5–12)
NEUTROPHILS # BLD AUTO: 6.69 10*3/MM3 (ref 1.9–8.1)
NEUTROPHILS NFR BLD AUTO: 51.4 % (ref 42.7–76)
PLATELET # BLD AUTO: 205 10*3/MM3 (ref 140–500)
PMV BLD AUTO: 11.5 FL (ref 6–12)
POTASSIUM BLD-SCNC: 3.9 MMOL/L (ref 3.5–5.2)
PROT SERPL-MCNC: 6.4 G/DL (ref 6–8.5)
RBC # BLD AUTO: 4.9 10*6/MM3 (ref 4.6–6)
RIGHT ARM BP: NORMAL MMHG
SODIUM BLD-SCNC: 131 MMOL/L (ref 136–145)
THYROPEROXIDASE AB SERPL-ACNC: 9 IU/ML (ref 0–34)
WBC NRBC COR # BLD: 13.04 10*3/MM3 (ref 4.5–10.7)

## 2019-01-01 PROCEDURE — 25010000002 VANCOMYCIN 10 G RECONSTITUTED SOLUTION: Performed by: FAMILY MEDICINE

## 2019-01-01 PROCEDURE — 0 GADOBENATE DIMEGLUMINE 529 MG/ML SOLUTION: Performed by: FAMILY MEDICINE

## 2019-01-01 PROCEDURE — A9577 INJ MULTIHANCE: HCPCS | Performed by: FAMILY MEDICINE

## 2019-01-01 PROCEDURE — G0008 ADMIN INFLUENZA VIRUS VAC: HCPCS | Performed by: FAMILY MEDICINE

## 2019-01-01 PROCEDURE — 25010000002 HYDROMORPHONE 1 MG/ML SOLUTION: Performed by: FAMILY MEDICINE

## 2019-01-01 PROCEDURE — 93306 TTE W/DOPPLER COMPLETE: CPT

## 2019-01-01 PROCEDURE — 70553 MRI BRAIN STEM W/O & W/DYE: CPT

## 2019-01-01 PROCEDURE — 82962 GLUCOSE BLOOD TEST: CPT

## 2019-01-01 PROCEDURE — 93880 EXTRACRANIAL BILAT STUDY: CPT

## 2019-01-01 PROCEDURE — 90661 CCIIV3 VAC ABX FR 0.5 ML IM: CPT | Performed by: FAMILY MEDICINE

## 2019-01-01 PROCEDURE — 25010000002 ENOXAPARIN PER 10 MG: Performed by: FAMILY MEDICINE

## 2019-01-01 PROCEDURE — 85025 COMPLETE CBC W/AUTO DIFF WBC: CPT | Performed by: FAMILY MEDICINE

## 2019-01-01 PROCEDURE — 25010000002 INFLUENZA VAC SUBUNIT QUAD 0.5 ML SUSPENSION PREFILLED SYRINGE: Performed by: FAMILY MEDICINE

## 2019-01-01 PROCEDURE — 80053 COMPREHEN METABOLIC PANEL: CPT | Performed by: FAMILY MEDICINE

## 2019-01-01 PROCEDURE — 83735 ASSAY OF MAGNESIUM: CPT | Performed by: FAMILY MEDICINE

## 2019-01-01 PROCEDURE — 25010000002 PERFLUTREN (DEFINITY) 8.476 MG IN SODIUM CHLORIDE 0.9 % 10 ML INJECTION: Performed by: FAMILY MEDICINE

## 2019-01-01 PROCEDURE — 93306 TTE W/DOPPLER COMPLETE: CPT | Performed by: INTERNAL MEDICINE

## 2019-01-01 PROCEDURE — 63710000001 INSULIN LISPRO (HUMAN) PER 5 UNITS: Performed by: FAMILY MEDICINE

## 2019-01-01 PROCEDURE — 25010000003 AMPICILLIN-SULBACTAM PER 1.5 G: Performed by: INTERNAL MEDICINE

## 2019-01-01 RX ORDER — AMPICILLIN AND SULBACTAM 2; 1 G/1; G/1
3 INJECTION, POWDER, FOR SOLUTION INTRAMUSCULAR; INTRAVENOUS EVERY 6 HOURS
Status: DISCONTINUED | OUTPATIENT
Start: 2019-01-01 | End: 2019-01-01

## 2019-01-01 RX ORDER — HYDROCODONE BITARTRATE AND ACETAMINOPHEN 7.5; 325 MG/1; MG/1
1 TABLET ORAL 4 TIMES DAILY PRN
Status: DISCONTINUED | OUTPATIENT
Start: 2019-01-01 | End: 2019-01-03 | Stop reason: HOSPADM

## 2019-01-01 RX ADMIN — LINAGLIPTIN 5 MG: 5 TABLET, FILM COATED ORAL at 10:06

## 2019-01-01 RX ADMIN — HYDROMORPHONE HYDROCHLORIDE 1 MG: 1 INJECTION, SOLUTION INTRAMUSCULAR; INTRAVENOUS; SUBCUTANEOUS at 02:30

## 2019-01-01 RX ADMIN — HYDROCODONE BITARTRATE AND ACETAMINOPHEN 1 TABLET: 7.5; 325 TABLET ORAL at 15:46

## 2019-01-01 RX ADMIN — OLMESARTAN MEDOXOMIL 20 MG: 40 TABLET ORAL at 10:49

## 2019-01-01 RX ADMIN — ATORVASTATIN CALCIUM 20 MG: 20 TABLET, FILM COATED ORAL at 10:05

## 2019-01-01 RX ADMIN — ENOXAPARIN SODIUM 40 MG: 40 INJECTION SUBCUTANEOUS at 10:04

## 2019-01-01 RX ADMIN — NEBIVOLOL HYDROCHLORIDE 5 MG: 5 TABLET ORAL at 10:06

## 2019-01-01 RX ADMIN — INSULIN LISPRO 16 UNITS: 100 INJECTION, SOLUTION INTRAVENOUS; SUBCUTANEOUS at 10:04

## 2019-01-01 RX ADMIN — VITAMIN D, TAB 1000IU (100/BT) 5000 UNITS: 25 TAB at 10:05

## 2019-01-01 RX ADMIN — PIOGLITAZONE 30 MG: 30 TABLET ORAL at 10:05

## 2019-01-01 RX ADMIN — NIFEDIPINE 90 MG: 60 TABLET, FILM COATED, EXTENDED RELEASE ORAL at 10:06

## 2019-01-01 RX ADMIN — SODIUM CHLORIDE, PRESERVATIVE FREE 3 ML: 5 INJECTION INTRAVENOUS at 10:39

## 2019-01-01 RX ADMIN — PERFLUTREN 3 ML: 6.52 INJECTION, SUSPENSION INTRAVENOUS at 09:30

## 2019-01-01 RX ADMIN — INSULIN LISPRO 16 UNITS: 100 INJECTION, SOLUTION INTRAVENOUS; SUBCUTANEOUS at 17:34

## 2019-01-01 RX ADMIN — GADOBENATE DIMEGLUMINE 20 ML: 529 INJECTION, SOLUTION INTRAVENOUS at 08:07

## 2019-01-01 RX ADMIN — PANTOPRAZOLE SODIUM 40 MG: 40 TABLET, DELAYED RELEASE ORAL at 06:38

## 2019-01-01 RX ADMIN — VANCOMYCIN HYDROCHLORIDE 2000 MG: 10 INJECTION, POWDER, LYOPHILIZED, FOR SOLUTION INTRAVENOUS at 10:04

## 2019-01-01 RX ADMIN — SODIUM CHLORIDE, PRESERVATIVE FREE 10 ML: 5 INJECTION INTRAVENOUS at 10:38

## 2019-01-01 RX ADMIN — INSULIN LISPRO 10 UNITS: 100 INJECTION, SOLUTION INTRAVENOUS; SUBCUTANEOUS at 12:11

## 2019-01-01 RX ADMIN — VANCOMYCIN HYDROCHLORIDE 2000 MG: 10 INJECTION, POWDER, LYOPHILIZED, FOR SOLUTION INTRAVENOUS at 02:26

## 2019-01-01 RX ADMIN — HYDROMORPHONE HYDROCHLORIDE 1 MG: 1 INJECTION, SOLUTION INTRAMUSCULAR; INTRAVENOUS; SUBCUTANEOUS at 06:38

## 2019-01-01 RX ADMIN — AMPICILLIN SODIUM AND SULBACTAM SODIUM 3 G: 2; 1 INJECTION, POWDER, FOR SOLUTION INTRAMUSCULAR; INTRAVENOUS at 21:35

## 2019-01-01 RX ADMIN — ENOXAPARIN SODIUM 40 MG: 40 INJECTION SUBCUTANEOUS at 12:10

## 2019-01-01 RX ADMIN — HYDROMORPHONE HYDROCHLORIDE 1 MG: 1 INJECTION, SOLUTION INTRAMUSCULAR; INTRAVENOUS; SUBCUTANEOUS at 22:54

## 2019-01-01 RX ADMIN — ZOLPIDEM TARTRATE 5 MG: 5 TABLET, FILM COATED ORAL at 20:19

## 2019-01-01 RX ADMIN — FENOFIBRATE 145 MG: 145 TABLET ORAL at 10:06

## 2019-01-01 RX ADMIN — SODIUM CHLORIDE, PRESERVATIVE FREE 10 ML: 5 INJECTION INTRAVENOUS at 20:19

## 2019-01-01 RX ADMIN — SODIUM CHLORIDE, PRESERVATIVE FREE 10 ML: 5 INJECTION INTRAVENOUS at 20:21

## 2019-01-01 RX ADMIN — HYDROCODONE BITARTRATE AND ACETAMINOPHEN 1 TABLET: 7.5; 325 TABLET ORAL at 20:20

## 2019-01-01 RX ADMIN — INFLUENZA A VIRUS A/SINGAPORE/GP1908/2015 IVR-180 (H1N1) ANTIGEN (MDCK CELL DERIVED, PROPIOLACTONE INACTIVATED), INFLUENZA A VIRUS A/NORTH CAROLINA/04/2016 (H3N2) HEMAGGLUTININ ANTIGEN (MDCK CELL DERIVED, PROPIOLACTONE INACTIVATED), INFLUENZA B VIRUS B/IOWA/06/2017 HEMAGGLUTININ ANTIGEN (MDCK CELL DERIVED, PROPIOLACTONE INACTIVATED), INFLUENZA B VIRUS B/SINGAPORE/INFTT-16-0610/2016 HEMAGGLUTININ ANTIGEN (MDCK CELL DERIVED, PROPIOLACTONE INACTIVATED) 0.5 ML: 15; 15; 15; 15 INJECTION, SUSPENSION INTRAMUSCULAR at 12:11

## 2019-01-01 RX ADMIN — HYDROMORPHONE HYDROCHLORIDE 1 MG: 1 INJECTION, SOLUTION INTRAMUSCULAR; INTRAVENOUS; SUBCUTANEOUS at 10:29

## 2019-01-01 RX ADMIN — HYDROMORPHONE HYDROCHLORIDE 1 MG: 1 INJECTION, SOLUTION INTRAMUSCULAR; INTRAVENOUS; SUBCUTANEOUS at 19:03

## 2019-01-01 RX ADMIN — HYDROMORPHONE HYDROCHLORIDE 1 MG: 1 INJECTION, SOLUTION INTRAMUSCULAR; INTRAVENOUS; SUBCUTANEOUS at 14:45

## 2019-01-01 NOTE — PROGRESS NOTES
"Pharmacokinetic Consult - Vancomycin Dosing (Follow-up Note)    Graham Meléndez is on day 1 pharmacy to dose vancomycin for SSTI.  Pharmacy dosing vancomycin per Dr. Reyes' request.   Goal trough: 15-20 mg/L     Relevant clinical data and objective history reviewed:  48 y.o. male 180.3 cm (71\") (!) 139 kg (305 lb 11.2 oz)    Past Medical History:   Diagnosis Date   • Abscess    • Allergic rhinitis    • Anxiety    • Depression    • Diabetes mellitus (CMS/HCC)    • GERD (gastroesophageal reflux disease)    • Hyperlipidemia    • Hypertension    • Hypertriglyceridemia    • Low back pain potentially associated with radiculopathy    • Myofascial pain    • Right temporal carbuncle    • Sleep apnea     cpap    • Vitamin D deficiency      Creatinine   Date Value Ref Range Status   12/31/2018 0.78 0.76 - 1.27 mg/dL Final   12/30/2018 1.28 (H) 0.76 - 1.27 mg/dL Final     BUN   Date Value Ref Range Status   12/31/2018 17 6 - 20 mg/dL Final     Estimated Creatinine Clearance: 169.1 mL/min (by C-G formula based on SCr of 0.78 mg/dL).    Lab Results   Component Value Date    WBC 19.50 (H) 12/31/2018     Temp Readings from Last 3 Encounters:   12/31/18 99.4 °F (37.4 °C) (Oral)   04/10/18 97.4 °F (36.3 °C)   01/31/17 97.7 °F (36.5 °C) (Oral)        Lab Results   Component Value Date    VANCOTROUGH 9.30 12/31/2018     No results found for: VANCORANDOM    Assessment/Plan  Vanco level 9.3 mcg/ml. Increasing Vanco dose to 2gm IV q8h (14mg/kg). Vancomycin level 0130 1/2. Pharmacy will continue to follow daily while on vancomycin and adjust as needed.     Esther Hansen [facility]    "

## 2019-01-01 NOTE — NURSING NOTE
"1030  Pt's sister called Dr. Reyes for a supplement oral pain med to give in between Dilaudid doses. Pt states does not get the \"high\" feeling from the Dilaudid anymore and is accusing nurses of not giving him his pain med.  Pt states did not receive a 0230 dose. Med was scanned as given.  "

## 2019-01-01 NOTE — PROGRESS NOTES
LOS: 2 days   Patient Care Team:  Reyes, Rosenberg Acosta, MD as PCP - General (Family Medicine)  Reyes, Rosenberg Acosta, MD (Family Medicine)        Subjective     Interval History:     Patient Complaints:   Patient Denies:  NVD       Review of Systems:    facila pain    Objective     Vital Signs  Temp:  [97.8 °F (36.6 °C)-99.4 °F (37.4 °C)] 97.8 °F (36.6 °C)  Heart Rate:  [77-83] 83  Resp:  [16] 16  BP: (110-135)/(71-77) 135/76    Physical Exam:     General Appearance:    Alert, cooperative, in no acute distress   Head:    Normocephalic, without obvious abnormality, atraumatic   Eyes:            Lids and lashes normal, conjunctivae and sclerae normal, no   icterus, no pallor, corneas clear, PERRLA   Ears:    Ears appear intact with no abnormalities noted   Throat:   No oral lesions, no thrush, oral mucosa moist  L face better   Neck:   No adenopathy, supple, trachea midline, no thyromegaly, no   carotid bruit, no JVD   Back:     No kyphosis present, no scoliosis present, no skin lesions,      erythema or scars, no tenderness to percussion or                   palpation,   range of motion normal   Lungs:     Clear to auscultation,respirations regular, even and                  unlabored    Heart:    Regular rhythm and normal rate, normal S1 and S2, no            murmur, no gallop, no rub, no click   Chest Wall:    No abnormalities observed   Abdomen:     Normal bowel sounds, no masses, no organomegaly, soft        non-tender, non-distended, no guarding, no rebound                tenderness   Rectal:     Deferred   Extremities:   Moves all extremities well, no edema, no cyanosis, no             redness   Pulses:   Pulses palpable and equal bilaterally   Skin:   No bleeding, bruising or rash   Lymph nodes:   No palpable adenopathy   Neurologic:   Cranial nerves 2 - 12 grossly intact, sensation intact, DTR       present and equal bilaterally          Results Review:      Lab Results (last 72 hours)     Procedure  Component Value Units Date/Time    POC Glucose Once [[id number]]  (Abnormal) Collected:  01/01/19 1651    Specimen:  Blood Updated:  01/01/19 1654     Glucose 407 mg/dL     POC Glucose Once [[id number]]  (Abnormal) Collected:  01/01/19 1151    Specimen:  Blood Updated:  01/01/19 1153     Glucose 333 mg/dL     Body Fluid Culture - Aspirate, Face [[id number]]  (Abnormal) Collected:  12/31/18 1635    Specimen:  Aspirate from Face Updated:  01/01/19 1041     BF Culture Moderate growth (3+) Streptococcus agalactiae (Group B)     Comment: This organism is considered to be universally susceptible to penicillin.  No further antibiotic testing will be performed.        Gram Stain Few (2+) Gram positive cocci      Rare (1+) WBCs per low power field    Comprehensive Metabolic Panel [[id number]]  (Abnormal) Collected:  01/01/19 0632    Specimen:  Blood Updated:  01/01/19 0744     Glucose 427 mg/dL      BUN 11 mg/dL      Creatinine 0.72 mg/dL      Sodium 131 mmol/L      Potassium 3.9 mmol/L      Chloride 96 mmol/L      CO2 22.9 mmol/L      Calcium 8.4 mg/dL      Total Protein 6.4 g/dL      Albumin 3.10 g/dL      ALT (SGPT) 17 U/L      AST (SGOT) 16 U/L      Alkaline Phosphatase 94 U/L      Total Bilirubin 0.2 mg/dL      eGFR Non African Amer 118 mL/min/1.73      Globulin 3.3 gm/dL      A/G Ratio 0.9 g/dL      BUN/Creatinine Ratio 15.3     Anion Gap 12.1 mmol/L     Magnesium [[id number]]  (Normal) Collected:  01/01/19 0632    Specimen:  Blood Updated:  01/01/19 0728     Magnesium 2.3 mg/dL     CBC & Differential [[id number]] Collected:  01/01/19 0632    Specimen:  Blood Updated:  01/01/19 0718    Narrative:       The following orders were created for panel order CBC & Differential.  Procedure                               Abnormality         Status                     ---------                               -----------         ------                     CBC Auto Differential[[id number]]        Abnormal            Final result             Event Note      Please view results for these tests on the individual orders.    CBC Auto Differential [[id number]]  (Abnormal) Collected:  01/01/19 0632    Specimen:  Blood Updated:  01/01/19 0718     WBC 13.04 10*3/mm3      RBC 4.90 10*6/mm3      Hemoglobin 14.9 g/dL      Hematocrit 44.4 %      MCV 90.6 fL      MCH 30.4 pg      MCHC 33.6 g/dL      RDW 12.8 %      RDW-SD 42.1 fl      MPV 11.5 fL      Platelets 205 10*3/mm3      Neutrophil % 51.4 %      Lymphocyte % 38.6 %      Monocyte % 8.1 %      Eosinophil % 1.5 %      Basophil % 0.2 %      Immature Grans % 0.2 %      Neutrophils, Absolute 6.69 10*3/mm3      Lymphocytes, Absolute 5.03 10*3/mm3      Monocytes, Absolute 1.06 10*3/mm3      Eosinophils, Absolute 0.20 10*3/mm3      Basophils, Absolute 0.03 10*3/mm3      Immature Grans, Absolute 0.03 10*3/mm3     Thyroid Peroxidase Antibody [[id number]] Collected:  12/31/18 0208    Specimen:  Blood Updated:  01/01/19 0710     Thyroid Peroxidase Antibody 9 IU/mL     Narrative:       Performed at:  [address] [place] [id number]  [id number]  : Agustin Centeno PhD, Phone:  [phone]    POC Glucose Once [[id number]]  (Abnormal) Collected:  01/01/19 0612    Specimen:  Blood Updated:  01/01/19 0614     Glucose 429 mg/dL     Vancomycin, Trough Vancomycin dose due at 2100. Please make sure Vancomycin IV is not infusing before drawing the vancomycin level. Hold vancomycin dose if level is >21 mcg/mL. [[id number]]  (Normal) Collected:  12/31/18 2039    Specimen:  Blood Updated:  12/31/18 2127     Vancomycin Trough 9.30 mcg/mL     POC Glucose Once [[id number]]  (Abnormal) Collected:  12/31/18 2038    Specimen:  Blood Updated:  12/31/18 2039     Glucose 401 mg/dL     Blood Culture - Blood, Arm, Left [[id number]] Collected:  12/30/18 1928    Specimen:  Blood from Arm, Left Updated:  12/31/18 1930     Blood Culture No growth at 24 hours    Blood Culture - Blood, Arm, Right [[id number]] Collected:  12/30/18 1964     Specimen:  Blood from Arm, Right Updated:  12/31/18 1900     Blood Culture No growth at 24 hours    POC Glucose Once [[id number]]  (Abnormal) Collected:  12/31/18 1735    Specimen:  Blood Updated:  12/31/18 1738     Glucose 323 mg/dL     Troponin [[id number]]  (Normal) Collected:  12/31/18 1408    Specimen:  Blood Updated:  12/31/18 1451     Troponin T <0.010 ng/mL     Narrative:       Troponin T Reference Ranges:  Less than 0.03 ng/mL:    Negative for AMI  0.03 to 0.09 ng/mL:      Indeterminant for AMI  Greater than 0.09 ng/mL: Positive for AMI    POC Glucose Once [[id number]]  (Abnormal) Collected:  12/31/18 1131    Specimen:  Blood Updated:  12/31/18 1133     Glucose 310 mg/dL     Troponin [[id number]]  (Normal) Collected:  12/31/18 0604    Specimen:  Blood Updated:  12/31/18 0714     Troponin T <0.010 ng/mL     Narrative:       Troponin T Reference Ranges:  Less than 0.03 ng/mL:    Negative for AMI  0.03 to 0.09 ng/mL:      Indeterminant for AMI  Greater than 0.09 ng/mL: Positive for AMI    Blood Gas, Arterial [[id number]]  (Abnormal) Collected:  12/31/18 0649    Specimen:  Arterial Blood Updated:  12/31/18 0651     Site Arterial: right radial     Conrad's Test Positive     pH, Arterial 7.363 pH units      pCO2, Arterial 48.9 mm Hg      pO2, Arterial 82.8 mm Hg      HCO3, Arterial 27.8 mmol/L      Base Excess, Arterial 1.4 mmol/L      O2 Saturation Calculated 95.5 %      Barometric Pressure for Blood Gas 748.3 mmHg      Modality Cannula     Set [facility] Resp Rate 18    POC Glucose Once [[id number]]  (Abnormal) Collected:  12/31/18 0642    Specimen:  Blood Updated:  12/31/18 0644     Glucose 384 mg/dL     Lipid Panel [[id number]]  (Abnormal) Collected:  12/31/18 0208    Specimen:  Blood Updated:  12/31/18 0435     Total Cholesterol 195 mg/dL      Triglycerides 1,155 mg/dL      HDL Cholesterol 24 mg/dL      LDL Cholesterol  -- mg/dL      Comment: Unable to calculate        VLDL Cholesterol -- mg/dL      Comment: Unable to  calculate        LDL/HDL Ratio --     Comment: Unable to calculate       Narrative:       Cholesterol Reference Ranges  (U.S. Department of Health and Human Services ATP III Classifications)    Desirable          <200 mg/dL  Borderline High    200-239 mg/dL  High Risk          >240 mg/dL      Triglyceride Reference Ranges  (U.S. Department of Health and Human Services ATP III Classifications)    Normal           <150 mg/dL  Borderline High  150-199 mg/dL  High             200-499 mg/dL  Very High        >500 mg/dL    HDL Reference Ranges  (U.S. Department of Health and Human Services ATP III Classifcations)    Low     <40 mg/dl (major risk factor for CHD)  High    >60 mg/dl ('negative' risk factor for CHD)        LDL Reference Ranges  (U.S. Department of Health and Human Services ATP III Classifcations)    Optimal          <100 mg/dL  Near Optimal     100-129 mg/dL  Borderline High  130-159 mg/dL  High             160-189 mg/dL  Very High        >189 mg/dL    LDL Cholesterol, Direct [[id number]]  (Normal) Collected:  12/31/18 0208    Specimen:  Blood Updated:  12/31/18 0353     LDL Cholesterol  73 mg/dL     Narrative:       LDL Reference Ranges    (U.S. Department of Health and Human Services ATP III Classifications)    Optimal          <100 mg/dl  Near Optimal     100-129 mg/dl  Borderline High  130-159 mg/dl  High             160-189 mg/dl  Very High        >189 mg/dl    Bilirubin, Total & Direct [[id number]] Collected:  12/31/18 0208    Specimen:  Blood Updated:  12/31/18 0337     Total Bilirubin 0.3 mg/dL      Bilirubin, Direct <0.2 mg/dL      Comment: Specimen hemolyzed. Results may be affected.        Bilirubin, Indirect -- mg/dL      Comment: Unable to calculate       Rheumatoid Factor [[id number]]  (Normal) Collected:  12/31/18 0208    Specimen:  Blood Updated:  12/31/18 0337     Rheumatoid Factor Quantitative <10.0 IU/mL     Troponin [[id number]]  (Normal) Collected:  12/31/18 0208    Specimen:  Blood Updated:   12/31/18 0333     Troponin T <0.010 ng/mL     Narrative:       Troponin T Reference Ranges:  Less than 0.03 ng/mL:    Negative for AMI  0.03 to 0.09 ng/mL:      Indeterminant for AMI  Greater than 0.09 ng/mL: Positive for AMI    T4, Free [[id number]]  (Normal) Collected:  12/31/18 0208    Specimen:  Blood Updated:  12/31/18 0333     Free T4 1.16 ng/dL     TSH [[id number]]  (Abnormal) Collected:  12/31/18 0208    Specimen:  Blood Updated:  12/31/18 0333     TSH 4.740 mIU/mL     Sedimentation Rate [[id number]]  (Normal) Collected:  12/31/18 0208    Specimen:  Blood Updated:  12/31/18 0323     Sed Rate 14 mm/hr     Comprehensive Metabolic Panel [[id number]]  (Abnormal) Collected:  12/31/18 0208    Specimen:  Blood Updated:  12/31/18 0323     Glucose 396 mg/dL      BUN 17 mg/dL      Creatinine 0.78 mg/dL      Sodium 130 mmol/L      Potassium 3.9 mmol/L      Chloride 91 mmol/L      CO2 23.8 mmol/L      Calcium 8.7 mg/dL      Total Protein 6.8 g/dL      Albumin 3.40 g/dL      ALT (SGPT) 17 U/L      AST (SGOT) 14 U/L      Comment: Specimen hemolyzed.  Results may be affected.        Alkaline Phosphatase 95 U/L      Total Bilirubin 0.2 mg/dL      eGFR Non African Amer 107 mL/min/1.73      Globulin 3.4 gm/dL      A/G Ratio 1.0 g/dL      BUN/Creatinine Ratio 21.8     Anion Gap 15.2 mmol/L     Uric Acid [[id number]]  (Normal) Collected:  12/31/18 0208    Specimen:  Blood Updated:  12/31/18 0323     Uric Acid 4.7 mg/dL     Magnesium [[id number]]  (Normal) Collected:  12/31/18 0208    Specimen:  Blood Updated:  12/31/18 0323     Magnesium 2.0 mg/dL     Gamma GT [[id number]]  (Normal) Collected:  12/31/18 0208    Specimen:  Blood Updated:  12/31/18 0322     GGT 58 U/L     Lipase [[id number]]  (Normal) Collected:  12/31/18 0208    Specimen:  Blood Updated:  12/31/18 0322     Lipase 27 U/L     C-reactive Protein [[id number]]  (Abnormal) Collected:  12/31/18 0208    Specimen:  Blood Updated:  12/31/18 0322     C-Reactive Protein 1.25 mg/dL      High Sensitivity CRP [[id number]]  (Abnormal) Collected:  12/31/18 0208    Specimen:  Blood Updated:  12/31/18 0322     C-Reactive Protein 0.967 mg/dL     Amylase [[id number]]  (Abnormal) Collected:  12/31/18 0208    Specimen:  Blood Updated:  12/31/18 0322     Amylase 22 U/L     CK [[id number]]  (Normal) Collected:  12/31/18 0208    Specimen:  Blood Updated:  12/31/18 0322     Creatine Kinase 43 U/L     BNP [[id number]]  (Normal) Collected:  12/31/18 0208    Specimen:  Blood Updated:  12/31/18 0321     proBNP 36.2 pg/mL     Narrative:       Among patients with dyspnea, NT-proBNP is highly sensitive for the detection of acute congestive heart failure. In addition NT-proBNP of <300 pg/ml effectively rules out acute congestive heart failure with 99% negative predictive value.    Hemoglobin A1c [[id number]]  (Abnormal) Collected:  12/31/18 0214    Specimen:  Blood Updated:  12/31/18 0317     Hemoglobin A1C 15.70 %     Narrative:       Hemoglobin A1C Ranges:    Increased Risk for Diabetes  5.7% to 6.4%  Diabetes                     >= 6.5%  Diabetic Goal                < 7.0%    Lactic Acid, Reflex [[id number]]  (Normal) Collected:  12/31/18 0208    Specimen:  Blood Updated:  12/31/18 0316     Lactate 1.3 mmol/L     CBC & Differential [[id number]] Collected:  12/31/18 0208    Specimen:  Blood Updated:  12/31/18 0300    Narrative:       The following orders were created for panel order CBC & Differential.  Procedure                               Abnormality         Status                     ---------                               -----------         ------                     CBC Auto Differential[[id number]]        Abnormal            Final result                 Please view results for these tests on the individual orders.    CBC Auto Differential [[id number]]  (Abnormal) Collected:  12/31/18 0208    Specimen:  Blood Updated:  12/31/18 0300     WBC 19.50 10*3/mm3      RBC 4.97 10*6/mm3      Hemoglobin 15.1 g/dL       Hematocrit 44.0 %      MCV 88.5 fL      MCH 30.4 pg      MCHC 34.3 g/dL      RDW 12.8 %      RDW-SD 40.6 fl      MPV 11.2 fL      Platelets 220 10*3/mm3      Neutrophil % 58.1 %      Lymphocyte % 32.2 %      Monocyte % 8.4 %      Eosinophil % 0.7 %      Basophil % 0.2 %      Immature Grans % 0.4 %      Neutrophils, Absolute 11.36 10*3/mm3      Lymphocytes, Absolute 6.27 10*3/mm3      Monocytes, Absolute 1.63 10*3/mm3      Eosinophils, Absolute 0.14 10*3/mm3      Basophils, Absolute 0.03 10*3/mm3      Immature Grans, Absolute 0.07 10*3/mm3     Insulin, Random [[id number]] Collected:  12/31/18 0208    Specimen:  Blood Updated:  12/31/18 0252    AUSTEN [[id number]] Collected:  12/31/18 0208    Specimen:  Blood Updated:  12/31/18 0251    C-Peptide [[id number]] Collected:  12/31/18 0208    Specimen:  Blood Updated:  12/31/18 0251    Thyroglobulin [[id number]] Collected:  12/31/18 0208    Specimen:  Blood Updated:  12/31/18 0251    Lactic Acid, Reflex Timer (This will reflex a repeat order 3-3:15 hours after ordered.) [[id number]] Collected:  12/30/18 1957    Specimen:  Blood Updated:  12/30/18 2330     Extra Tube Hold for add-ons.     Comment: Auto resulted.       POC Glucose Once [[id number]]  (Abnormal) Collected:  12/30/18 2105    Specimen:  Blood Updated:  12/30/18 2107     Glucose 483 mg/dL     Lactic Acid, Plasma [[id number]]  (Abnormal) Collected:  12/30/18 1957    Specimen:  Blood Updated:  12/30/18 2020     Lactate 4.2 mmol/L     Phosphorus [[id number]]  (Abnormal) Collected:  12/30/18 1821    Specimen:  Blood Updated:  12/30/18 1953     Phosphorus 4.6 mg/dL     Magnesium [[id number]]  (Normal) Collected:  12/30/18 1821    Specimen:  Blood Updated:  12/30/18 1953     Magnesium 2.0 mg/dL     Lena Draw [[id number]] Collected:  12/30/18 1821    Specimen:  Blood Updated:  12/30/18 1930    Narrative:       The following orders were created for panel order Lena Draw.  Procedure                               Abnormality          Status                     ---------                               -----------         ------                     Light Blue Top[[id number]]                                   Final result               Green Top (Gel)[[id number]]                                  Final result               Lavender Top[[id number]]                                     Final result               Gold Top - SST[[id number]]                                   Final result                 Please view results for these tests on the individual orders.    Green Top (Gel) [[id number]] Collected:  12/30/18 1821    Specimen:  Blood Updated:  12/30/18 1930     Extra Tube Hold for add-ons.     Comment: Auto resulted.       Lavender Top [[id number]] Collected:  12/30/18 1821    Specimen:  Blood Updated:  12/30/18 1930     Extra Tube hold for add-on     Comment: Auto resulted       Light Blue Top [[id number]] Collected:  12/30/18 1821    Specimen:  Blood Updated:  12/30/18 1930     Extra Tube hold for add-on     Comment: Auto resulted       Gold Top - SST [[id number]] Collected:  12/30/18 1821    Specimen:  Blood Updated:  12/30/18 1930     Extra Tube Hold for add-ons.     Comment: Auto resulted.       Comprehensive Metabolic Panel [[id number]]  (Abnormal) Collected:  12/30/18 1821    Specimen:  Blood Updated:  12/30/18 1918     Glucose 747 mg/dL      BUN 25 mg/dL      Creatinine 1.28 mg/dL      Sodium 124 mmol/L      Potassium 5.3 mmol/L      Chloride 83 mmol/L      CO2 22.4 mmol/L      Calcium 10.1 mg/dL      Total Protein 7.8 g/dL      Albumin 4.00 g/dL      ALT (SGPT) 21 U/L      AST (SGOT) 15 U/L      Alkaline Phosphatase 113 U/L      Total Bilirubin 0.4 mg/dL      eGFR Non African Amer 61 mL/min/1.73      Globulin 3.8 gm/dL      A/G Ratio 1.1 g/dL      BUN/Creatinine Ratio 19.5     Anion Gap 18.6 mmol/L     Ketone Bodies, Serum (Not performed at [place]) [[id number]] Collected:  12/30/18 1821    Specimen:  Blood Updated:  12/30/18 1909    Narrative:        The following orders were created for panel order Ketone Bodies, Serum (Not performed at [place]).  Procedure                               Abnormality         Status                     ---------                               -----------         ------                     Beta Hydroxybutyrate Richard...[[id number]]  Abnormal            Final result                 Please view results for these tests on the individual orders.    Beta Hydroxybutyrate Quantitative [[id number]]  (Abnormal) Collected:  12/30/18 1821    Specimen:  Blood Updated:  12/30/18 1909     Beta-Hydroxybutyrate Quant 0.528 mmol/L     Urinalysis With Microscopic If Indicated (No Culture) - Urine, Clean Catch [[id number]]  (Abnormal) Collected:  12/30/18 1843    Specimen:  Urine, Clean Catch Updated:  12/30/18 1900     Color, UA Yellow     Appearance, UA Clear     pH, UA 5.5     Specific Gravity, UA >=1.030     Glucose, UA >=1000 mg/dL (3+)     Ketones, UA Trace     Bilirubin, UA Negative     Blood, UA Negative     Protein, UA Negative     Leuk Esterase, UA Negative     Nitrite, UA Negative     Urobilinogen, UA 0.2 E.U./dL    Narrative:       Urine microscopic not indicated.    CBC & Differential [[id number]] Collected:  12/30/18 1821    Specimen:  Blood Updated:  12/30/18 1858    Narrative:       The following orders were created for panel order CBC & Differential.  Procedure                               Abnormality         Status                     ---------                               -----------         ------                     Scan Slide[[id number]]                                                                  CBC Auto Differential[[id number]]        Abnormal            Final result                 Please view results for these tests on the individual orders.    CBC Auto Differential [[id number]]  (Abnormal) Collected:  12/30/18 1821    Specimen:  Blood Updated:  12/30/18 1858     WBC 16.01 10*3/mm3      RBC 5.59 10*6/mm3      Hemoglobin 16.8  g/dL      Hematocrit 46.6 %      MCV 83.4 fL      MCH 30.1 pg      MCHC 36.1 g/dL      RDW 12.6 %      RDW-SD 38.0 fl      MPV 12.0 fL      Platelets 236 10*3/mm3      Neutrophil % 63.6 %      Lymphocyte % 26.0 %      Monocyte % 10.1 %      Eosinophil % 0.2 %      Basophil % 0.1 %      Immature Grans % 0.6 %      Neutrophils, Absolute 10.18 10*3/mm3      Lymphocytes, Absolute 4.16 10*3/mm3      Monocytes, Absolute 1.61 10*3/mm3      Eosinophils, Absolute 0.04 10*3/mm3      Basophils, Absolute 0.02 10*3/mm3      Immature Grans, Absolute 0.09 10*3/mm3           Imaging Results (last 72 hours)     Procedure Component Value Units Date/Time    MRI Brain With & Without Contrast [[id number]] Collected:  01/01/19 0842     Updated:  01/01/19 0852    Narrative:       MRI BRAIN W WO CONTRAST-     INDICATIONS: Disequilibrium     TECHNIQUE: Multiplanar multisequence magnetic resonance imaging of the  brain, without and with intravenous contrast material.     COMPARISON:  1/26/2017. Correlated with facial CT from 12/30/2018     FINDINGS:     The diffusion-weighted images show no restricted diffusion to suggest  acute infarct.     The midline structures appear unremarkable.     The brain parenchyma shows normal signal intensity. No enhancing lesions  of brain. Vascular flow related artifact is seen on the postcontrast  images     Flow voids in the major arteries at the base of the brain appear  unremarkable.     Small likely mucous retention cyst or polyp in the right maxillary  sinus. The paranasal sinuses, mastoid air cells, and orbits appear  otherwise unremarkable.     At the previously CT-demonstrated abscess of the left upper lip,  marginally enhancing fluid collection with surrounding edema is also  noted on this exam, without obvious change.       Impression:          1. No acute infarct. No enhancing lesions of brain. No conspicuous white  matter disease. Follow-up as indications persist  2. Redemonstration of abscess of  the left upper lip        This report was finalized on 1/1/2019 8:49 AM by Dr. Hima Ivory M.D.       CT Guided Biopsy Aspiration Injection [[id number]] Collected:  12/31/18 1648     Updated:  12/31/18 1655    Narrative:       CT GUIDED LEFT LIP COLLECTION ASPIRATION     HISTORY: Left lip abscess     Radiation dose reduction techniques were utilized, including automated  exposure control and exposure modulation based on body size.     Procedure explained to the patient with potential complications. After  signed informed consent was obtained the patient was placed in the  supine position on the CT table. . After appropriate prep and drape of  the skin surface with Betadine, 1% lidocaine for local anesthesia. An  18-gauge needle was advanced into the fluid collection and only 0.5 cc  of mixed thick, bloody and yellow fluid was able to be aspirated.     CONCLUSION: Successful left ligament fluid collection aspiration using  CT guidance. No complications encountered.     This report was finalized on 12/31/2018 4:50 PM by Dr. Kiko Edwards M.D.       CT Facial Bones With Contrast [[id number]] Collected:  12/30/18 2116     Updated:  12/30/18 2126    Narrative:       CT OF THE FACIAL BONES WITH CONTRAST     HISTORY: Left-sided facial swelling     COMPARISON: None available.     TECHNIQUE: Axial CT imaging was obtained from the facial bones following  administration of IV contrast. Coronal and sagittal reformatted images  were obtained.        FINDINGS:  Visualized portions of the brain parenchyma appear unremarkable. Orbits  appear unremarkable. There is a mucous retention cyst seen within the  right maxillary sinus. The remainder of the visualized paranasal sinuses  and mastoid air cells appear clear. There is extensive soft tissue  swelling which is noted overlying the left upper lip. It is associated  with a rim-enhancing collection, characteristic of abscess. The  collection measures at least 2.5 x 1.5 cm. No  extension into the oral  cavity is identified. No aggressive osseous abnormalities are seen. The  nasopharynx and oropharynx, and hypopharynx all appear unremarkable. The  patient does have some enlarged cervical lymph nodes on the left. For  example, one node inferior to the left mandible measures up to 2.4 x 1.0  cm. These are likely reactive, given the adjacent inflammation. No  abnormality is seen on the right.          Impression:          1. Extensive soft tissue swelling seen overlying the left upper lip,  characteristic of cellulitis. In addition, there is a rim-enhancing  collection, which measures at least 2.5 x 1.5 cm, characteristic of  abscess. I do not see any extension into the oral cavity, and no  aggressive osseous abnormalities are seen. Enlarged cervical lymph nodes  are noted on the left, likely reactive.     Radiation dose reduction techniques were utilized, including automated  exposure control and exposure modulation based on body size.     This report was finalized on 12/30/2018 9:23 PM by Dr. Taylor Sarabia M.D.               Medication Review:     Hospital Medications (active)       Dose Frequency Start End    acetaminophen (TYLENOL) tablet 325 mg 325 mg Every 4 Hours PRN 12/30/2018     Sig - Route: Take 1 tablet by mouth Every 4 (Four) Hours As Needed for Mild Pain . - Oral    acetaminophen (TYLENOL) tablet 650 mg 650 mg Every 4 Hours PRN 12/30/2018     Sig - Route: Take 2 tablets by mouth Every 4 (Four) Hours As Needed for Mild Pain . - Oral    atorvastatin (LIPITOR) tablet 20 mg 20 mg Daily 12/31/2018     Sig - Route: Take 1 tablet by mouth Daily. - Oral    cholecalciferol (VITAMIN D3) tablet 5,000 Units 5,000 Units Daily 12/31/2018     Sig - Route: Take 5 tablets by mouth Daily. - Oral    Dapagliflozin Propanediol tablet 10 mg 10 mg Daily 12/31/2018     Sig - Route: Take 10 mg by mouth Daily. - Oral    enoxaparin (LOVENOX) syringe 40 mg 40 mg Every 12 Hours 12/30/2018     Sig -  Route: Inject 0.4 mL under the skin into the appropriate area as directed Every 12 (Twelve) Hours. - Subcutaneous    exenatide (BYETTA) injection 10 mcg 10 mcg Every 12 Hours Scheduled 12/30/2018     Sig - Route: Inject 0.04 mL under the skin into the appropriate area as directed Every 12 (Twelve) Hours. - Subcutaneous    Notes to Pharmacy: Patient is non-compliant at home, and does not have own supply, per conversation with RN in ED.    fenofibrate (TRICOR) tablet 145 mg 145 mg Daily 12/31/2018     Sig - Route: Take 1 tablet by mouth Daily. - Oral    gadobenate dimeglumine (MULTIHANCE) injection 20 mL 20 mL Once in Imaging 1/1/2019 1/1/2019    Sig - Route: Infuse 20 mL into a venous catheter Once. - Intravenous    HYDROcodone-acetaminophen (NORCO) 7.5-325 MG per tablet 1 tablet 1 tablet 4 Times Daily PRN 1/1/2019 1/11/2019    Sig - Route: Take 1 tablet by mouth 4 (Four) Times a Day As Needed for Moderate Pain . - Oral    HYDROmorphone (DILAUDID) injection 1 mg 1 mg Every 4 Hours PRN 12/30/2018 1/9/2019    Sig - Route: Infuse 1 mL into a venous catheter Every 4 (Four) Hours As Needed for Severe Pain . - Intravenous    Influenza Vac Subunit Quad (FLUCELVAX) injection 0.5 mL 0.5 mL Once 1/1/2019 1/1/2019    Sig - Route: Inject 0.5 mL into the appropriate muscle as directed by prescriber 1 (One) Time. - Intramuscular    insulin lispro (humaLOG) injection 1-20 Units 1-20 Units 3 Times Daily With Meals 12/31/2018     Sig - Route: Inject 1-20 Units under the skin into the appropriate area as directed 3 (Three) Times a Day With Meals. - Subcutaneous    linagliptin (TRADJENTA) tablet 5 mg 5 mg Daily 12/31/2018     Sig - Route: Take 1 tablet by mouth Daily. - Oral    magnesium sulfate 2g/50 mL (PREMIX) infusion 2 g As Needed 12/30/2018 1/6/2019    Sig - Route: Infuse 50 mL into a venous catheter As Needed (to be given for Magnesium levels 1.8 or less PRN.). - Intravenous    metFORMIN (GLUCOPHAGE) tablet 1,000 mg 1,000 mg 2  "Times Daily With Meals 1/2/2019     Sig - Route: Take 1 tablet by mouth 2 (Two) Times a Day With Meals. - Oral    Cosign for Ordering: Accepted by Reyes, Rosenberg Acosta, MD on 12/31/2018 11:03 AM    nebivolol (BYSTOLIC) tablet 5 mg 5 mg Daily 12/31/2018     Sig - Route: Take 1 tablet by mouth Daily. - Oral    nicotine (NICODERM CQ) 14 MG/24HR patch 1 patch 1 patch Every 24 Hours Scheduled 12/31/2018     Sig - Route: Place 1 patch on the skin as directed by provider Daily. - Transdermal    NIFEdipine XL (PROCARDIA XL) 24 hr tablet 90 mg 90 mg Daily 12/31/2018     Sig - Route: Take 90 mg by mouth Daily. - Oral    olmesartan (BENICAR) tablet 40 mg 40 mg Daily 12/31/2018     Sig - Route: Take 1 tablet by mouth Daily. - Oral    pantoprazole (PROTONIX) EC tablet 40 mg 40 mg Every Morning 12/31/2018     Sig - Route: Take 1 tablet by mouth Every Morning. - Oral    perflutren (DEFINITY) 8.476 mg in sodium chloride 0.9 % 10 mL injection 3 mL Once 1/1/2019 1/1/2019    Sig - Route: Infuse 3 mL into a venous catheter 1 (One) Time. - Intravenous    Pharmacy to dose vancomycin  Continuous PRN 12/30/2018 1/20/2019    Sig - Route: Continuous As Needed for Consult. - Does not apply    pioglitazone (ACTOS) tablet 30 mg 30 mg Daily 12/31/2018     Sig - Route: Take 1 tablet by mouth Daily. - Oral    piperacillin-tazobactam (ZOSYN) 4.5 g in iso-osmotic dextrose 100 mL IVPB (premix) 4.5 g Once 1/1/2019     Sig - Route: Infuse 100 mL into a venous catheter 1 (One) Time. - Intravenous    piperacillin-tazobactam (ZOSYN) 4.5 g in iso-osmotic dextrose 100 mL IVPB (premix) 4.5 g Every 8 Hours 1/1/2019 1/8/2019    Sig - Route: Infuse 100 mL into a venous catheter Every 8 (Eight) Hours. - Intravenous    potassium chloride (KLOR-CON) packet 40 mEq 40 mEq As Needed 12/30/2018     Sig - Route: Take 40 mEq by mouth As Needed (potassium replacement, see admin instructions). - Oral    Linked Group 1:  \"Or\" Linked Group Details        potassium " "chloride (MICRO-K) CR capsule 40 mEq 40 mEq As Needed 12/30/2018     Sig - Route: Take 4 capsules by mouth As Needed (potassium replacement.  see admin instructions). - Oral    Linked Group 1:  \"Or\" Linked Group Details        potassium chloride 10 mEq in 100 mL IVPB 10 mEq Every 1 Hour PRN 12/30/2018     Sig - Route: Infuse 100 mL into a venous catheter Every 1 (One) Hour As Needed (potassium protocol PERIPHERAL - see admin instructions). - Intravenous    Linked Group 1:  \"Or\" Linked Group Details        sodium chloride (HYPERTONIC) 3 % infusion 60 mL/hr Once 12/31/2018     Sig - Route: Infuse 60 mL/hr into a venous catheter 1 (One) Time. - Intravenous    sodium chloride 0.9 % flush 1-10 mL 1-10 mL As Needed 12/31/2018     Sig - Route: Infuse 1-10 mL into a venous catheter As Needed for Line Care. - Intravenous    sodium chloride 0.9 % flush 10 mL 10 mL Every 12 Hours Scheduled 12/31/2018     Sig - Route: Infuse 10 mL into a venous catheter Every 12 (Twelve) Hours. - Intravenous    sodium chloride 0.9 % flush 10 mL 10 mL Every 12 Hours Scheduled 12/31/2018     Sig - Route: Infuse 10 mL into a venous catheter Every 12 (Twelve) Hours. - Intravenous    sodium chloride 0.9 % flush 10 mL 10 mL As Needed 12/31/2018     Sig - Route: Infuse 10 mL into a venous catheter As Needed for Line Care (After Medication Administration). - Intravenous    sodium chloride 0.9 % flush 20 mL 20 mL As Needed 12/31/2018     Sig - Route: Infuse 20 mL into a venous catheter As Needed for Line Care (After Blood Draws or Blood Product Administration). - Intravenous    sodium chloride 0.9 % flush 3 mL 3 mL Every 12 Hours Scheduled 12/30/2018     Sig - Route: Infuse 3 mL into a venous catheter Every 12 (Twelve) Hours. - Intravenous    sodium chloride 0.9 % flush 3 mL 3 mL Every 12 Hours Scheduled 12/31/2018     Sig - Route: Infuse 3 mL into a venous catheter Every 12 (Twelve) Hours. - Intravenous    sodium chloride 0.9 % flush 3-10 mL 3-10 mL As " Needed 12/30/2018     Sig - Route: Infuse 3-10 mL into a venous catheter As Needed for Line Care. - Intravenous    vancomycin 2000 mg/500 mL 0.9% NS IVPB ([facility]) 2,000 mg Every 8 Hours 1/1/2019 1/21/2019    Sig - Route: Infuse 500 mL into a venous catheter Every 8 (Eight) Hours. - Intravenous    zolpidem (AMBIEN) tablet 5 mg 5 mg Nightly PRN 12/30/2018 1/9/2019    Sig - Route: Take 1 tablet by mouth At Night As Needed for Sleep. - Oral    vancomycin 1500 mg/500 mL 0.9% NS IVPB ([facility]) (Discontinued) 10 mg/kg × 139 kg Every 8 Hours 12/31/2018 12/31/2018    Sig - Route: Infuse 500 mL into a venous catheter Every 8 (Eight) Hours. - Intravenous    Reason for Discontinue: Dose adjustment          atorvastatin 20 mg Oral Daily   cholecalciferol 5,000 Units Oral Daily   Dapagliflozin Propanediol 10 mg Oral Daily   enoxaparin 40 mg Subcutaneous Q12H   exenatide 10 mcg Subcutaneous Q12H   fenofibrate 145 mg Oral Daily   insulin lispro 1-20 Units Subcutaneous TID With Meals   linagliptin 5 mg Oral Daily   [START ON 1/2/2019] metFORMIN 1,000 mg Oral BID With Meals   nebivolol 5 mg Oral Daily   nicotine 1 patch Transdermal Q24H   NIFEdipine XL 90 mg Oral Daily   olmesartan 40 mg Oral Daily   pantoprazole 40 mg Oral QAM   pioglitazone 30 mg Oral Daily   piperacillin-tazobactam 4.5 g Intravenous Once   piperacillin-tazobactam 4.5 g Intravenous Q8H   sodium chloride 60 mL/hr Intravenous Once   sodium chloride 10 mL Intravenous Q12H   sodium chloride 10 mL Intravenous Q12H   sodium chloride 3 mL Intravenous Q12H   sodium chloride 3 mL Intravenous Q12H   vancomycin 2,000 mg Intravenous Q8H       Assessment/Plan         Cellulitis of left upper lip with abscess formation with sepsis    Azotemia    Glucosuria    Dehydration    Hyperkalemia    Hyponatremia    Hyperglycemia    Labile hypertension    Uncontrolled type 2 diabetes mellitus with hyperglycemia (CMS/HCC)      C&S  Strep B      Plan :    DC vanc and zosyn  IV Unasyn  Home soon on  10 days of PO Augmentin  875 mg BID    Dinesh Carter MD  01/01/19  5:15 PM

## 2019-01-01 NOTE — CONSULTS
LOS: 1 day   Patient Care Team:  Reyes, Rosenberg Acosta, MD as PCP - General (Family Medicine)  Reyes, Rosenberg Acosta, MD (Family Medicine)        Subjective       Attending MD : Reyes, Rosenberg Acosta,*    Patient Complaints: L faial pain nad swelling         History of Present Illness  :patient is a 48-year-old white male  with uncontrolled diabetes and a past history of   MSSA and ?  MRSA at various sites of the left face including left scalp left postauricular surface and now the left upper lip extending onto the cheek and up to the temple region.  He complains of significant pain never been previously treated with vancomycin for this current episode.  He is begun on vancomycin on admission but the results are not yet improving asked for Tx       Patient Denies:  NVD    PMH :   Cellulitis of left upper lip with abscess formation with sepsis    Azotemia    Glucosuria    Dehydration    Hyperkalemia    Hyponatremia    Hyperglycemia    Labile hypertension    Uncontrolled type 2 diabetes mellitus with hyperglycemia (CMS/[facility])      Review of Systems:    10 ,point ROS done    Objective     Vital Signs  Temp:  [98.4 °F (36.9 °C)-98.6 °F (37 °C)] 98.6 °F (37 °C)  Heart Rate:  [87-92] 90  Resp:  [14-18] 16  BP: (123-152)/(63-89) 137/86    Physical Exam:     General Appearance:    Alert, cooperative, in no acute distress   Head:    Normocephalic, without obvious abnormality, atraumatic   Eyes:            Lids and lashes normal, conjunctivae and sclerae normal, no   icterus, no pallor, corneas clear, PERRLA  L Facial and L Upper lip edema tender   Ears:    Ears appear intact with no abnormalities noted   Throat:   No oral lesions, no thrush, oral mucosa moist   Neck:   No adenopathy, supple, trachea midline, no thyromegaly, no   carotid bruit, no JVD   Back:     No kyphosis present, no scoliosis present, no skin lesions,      erythema or scars, no tenderness to percussion or                   palpation,    range of motion normal   Lungs:     Clear to auscultation,respirations regular, even and                  unlabored    Heart:    Regular rhythm and normal rate, normal S1 and S2, no            murmur, no gallop, no rub, no click   Chest Wall:    No abnormalities observed   Abdomen:     Normal bowel sounds, no masses, no organomegaly, soft        non-tender, non-distended, no guarding, no rebound                tenderness   Rectal:     Deferred   Extremities:   Moves all extremities well, no edema, no cyanosis, no             redness   Pulses:   Pulses palpable and equal bilaterally   Skin:   No bleeding, bruising or rash   Lymph nodes:   No palpable adenopathy   Neurologic:   Cranial nerves 2 - 12 grossly intact, sensation intact, DTR       present and equal bilaterally          Results Review:    Lab Results (last 72 hours)     Procedure Component Value Units Date/Time    Blood Culture - Blood, Arm, Right [[id number]] Collected:  12/30/18 1837    Specimen:  Blood from Arm, Right Updated:  12/31/18 1900     Blood Culture No growth at 24 hours    Body Fluid Culture - Aspirate, Face [[id number]] Collected:  12/31/18 1635    Specimen:  Aspirate from Face Updated:  12/31/18 1740    POC Glucose Once [[id number]]  (Abnormal) Collected:  12/31/18 1735    Specimen:  Blood Updated:  12/31/18 1738     Glucose 323 mg/dL     Troponin [[id number]]  (Normal) Collected:  12/31/18 1408    Specimen:  Blood Updated:  12/31/18 1451     Troponin T <0.010 ng/mL     Narrative:       Troponin T Reference Ranges:  Less than 0.03 ng/mL:    Negative for AMI  0.03 to 0.09 ng/mL:      Indeterminant for AMI  Greater than 0.09 ng/mL: Positive for AMI    POC Glucose Once [[id number]]  (Abnormal) Collected:  12/31/18 1131    Specimen:  Blood Updated:  12/31/18 1133     Glucose 310 mg/dL     Blood Culture - Blood, Arm, Left [[id number]] Collected:  12/30/18 1928    Specimen:  Blood from Arm, Left Updated:  12/31/18 0730     Blood Culture No growth at  less than 24 hours    Troponin [[id number]]  (Normal) Collected:  12/31/18 0604    Specimen:  Blood Updated:  12/31/18 0714     Troponin T <0.010 ng/mL     Narrative:       Troponin T Reference Ranges:  Less than 0.03 ng/mL:    Negative for AMI  0.03 to 0.09 ng/mL:      Indeterminant for AMI  Greater than 0.09 ng/mL: Positive for AMI    Blood Gas, Arterial [[id number]]  (Abnormal) Collected:  12/31/18 0649    Specimen:  Arterial Blood Updated:  12/31/18 0651     Site Arterial: right radial     Conrad's Test Positive     pH, Arterial 7.363 pH units      pCO2, Arterial 48.9 mm Hg      pO2, Arterial 82.8 mm Hg      HCO3, Arterial 27.8 mmol/L      Base Excess, Arterial 1.4 mmol/L      O2 Saturation Calculated 95.5 %      Barometric Pressure for Blood Gas 748.3 mmHg      Modality Cannula     Set Mech Resp Rate 18    POC Glucose Once [[id number]]  (Abnormal) Collected:  12/31/18 0642    Specimen:  Blood Updated:  12/31/18 0644     Glucose 384 mg/dL     Lipid Panel [[id number]]  (Abnormal) Collected:  12/31/18 0208    Specimen:  Blood Updated:  12/31/18 0435     Total Cholesterol 195 mg/dL      Triglycerides 1,155 mg/dL      HDL Cholesterol 24 mg/dL      LDL Cholesterol  -- mg/dL      Comment: Unable to calculate        VLDL Cholesterol -- mg/dL      Comment: Unable to calculate        LDL/HDL Ratio --     Comment: Unable to calculate       Narrative:       Cholesterol Reference Ranges  (U.S. Department of Health and Human Services ATP III Classifications)    Desirable          <200 mg/dL  Borderline High    200-239 mg/dL  High Risk          >240 mg/dL      Triglyceride Reference Ranges  (U.S. Department of Health and Human Services ATP III Classifications)    Normal           <150 mg/dL  Borderline High  150-199 mg/dL  High             200-499 mg/dL  Very High        >500 mg/dL    HDL Reference Ranges  (U.S. Department of Health and Human Services ATP III Classifcations)    Low     <40 mg/dl (major risk factor for CHD)  High     >60 mg/dl ('negative' risk factor for CHD)        LDL Reference Ranges  (U.S. Department of Health and Human Services ATP III Classifcations)    Optimal          <100 mg/dL  Near Optimal     100-129 mg/dL  Borderline High  130-159 mg/dL  High             160-189 mg/dL  Very High        >189 mg/dL    LDL Cholesterol, Direct [[id number]]  (Normal) Collected:  12/31/18 0208    Specimen:  Blood Updated:  12/31/18 0353     LDL Cholesterol  73 mg/dL     Narrative:       LDL Reference Ranges    (U.S. Department of Health and Human Services ATP III Classifications)    Optimal          <100 mg/dl  Near Optimal     100-129 mg/dl  Borderline High  130-159 mg/dl  High             160-189 mg/dl  Very High        >189 mg/dl    Bilirubin, Total & Direct [[id number]] Collected:  12/31/18 0208    Specimen:  Blood Updated:  12/31/18 0337     Total Bilirubin 0.3 mg/dL      Bilirubin, Direct <0.2 mg/dL      Comment: Specimen hemolyzed. Results may be affected.        Bilirubin, Indirect -- mg/dL      Comment: Unable to calculate       Rheumatoid Factor [[id number]]  (Normal) Collected:  12/31/18 0208    Specimen:  Blood Updated:  12/31/18 0337     Rheumatoid Factor Quantitative <10.0 IU/mL     Troponin [[id number]]  (Normal) Collected:  12/31/18 0208    Specimen:  Blood Updated:  12/31/18 0333     Troponin T <0.010 ng/mL     Narrative:       Troponin T Reference Ranges:  Less than 0.03 ng/mL:    Negative for AMI  0.03 to 0.09 ng/mL:      Indeterminant for AMI  Greater than 0.09 ng/mL: Positive for AMI    T4, Free [[id number]]  (Normal) Collected:  12/31/18 0208    Specimen:  Blood Updated:  12/31/18 0333     Free T4 1.16 ng/dL     TSH [[id number]]  (Abnormal) Collected:  12/31/18 0208    Specimen:  Blood Updated:  12/31/18 0333     TSH 4.740 mIU/mL     Sedimentation Rate [[id number]]  (Normal) Collected:  12/31/18 0208    Specimen:  Blood Updated:  12/31/18 0323     Sed Rate 14 mm/hr     Comprehensive Metabolic Panel [[id number]]  (Abnormal)  Collected:  12/31/18 0208    Specimen:  Blood Updated:  12/31/18 0323     Glucose 396 mg/dL      BUN 17 mg/dL      Creatinine 0.78 mg/dL      Sodium 130 mmol/L      Potassium 3.9 mmol/L      Chloride 91 mmol/L      CO2 23.8 mmol/L      Calcium 8.7 mg/dL      Total Protein 6.8 g/dL      Albumin 3.40 g/dL      ALT (SGPT) 17 U/L      AST (SGOT) 14 U/L      Comment: Specimen hemolyzed.  Results may be affected.        Alkaline Phosphatase 95 U/L      Total Bilirubin 0.2 mg/dL      eGFR Non African Amer 107 mL/min/1.73      Globulin 3.4 gm/dL      A/G Ratio 1.0 g/dL      BUN/Creatinine Ratio 21.8     Anion Gap 15.2 mmol/L     Uric Acid [[id number]]  (Normal) Collected:  12/31/18 0208    Specimen:  Blood Updated:  12/31/18 0323     Uric Acid 4.7 mg/dL     Magnesium [[id number]]  (Normal) Collected:  12/31/18 0208    Specimen:  Blood Updated:  12/31/18 0323     Magnesium 2.0 mg/dL     Gamma GT [[id number]]  (Normal) Collected:  12/31/18 0208    Specimen:  Blood Updated:  12/31/18 0322     GGT 58 U/L     Lipase [[id number]]  (Normal) Collected:  12/31/18 0208    Specimen:  Blood Updated:  12/31/18 0322     Lipase 27 U/L     C-reactive Protein [[id number]]  (Abnormal) Collected:  12/31/18 0208    Specimen:  Blood Updated:  12/31/18 0322     C-Reactive Protein 1.25 mg/dL     High Sensitivity CRP [[id number]]  (Abnormal) Collected:  12/31/18 0208    Specimen:  Blood Updated:  12/31/18 0322     C-Reactive Protein 0.967 mg/dL     Amylase [[id number]]  (Abnormal) Collected:  12/31/18 0208    Specimen:  Blood Updated:  12/31/18 0322     Amylase 22 U/L     CK [[id number]]  (Normal) Collected:  12/31/18 0208    Specimen:  Blood Updated:  12/31/18 0322     Creatine Kinase 43 U/L     BNP [[id number]]  (Normal) Collected:  12/31/18 0208    Specimen:  Blood Updated:  12/31/18 0321     proBNP 36.2 pg/mL     Narrative:       Among patients with dyspnea, NT-proBNP is highly sensitive for the detection of acute congestive heart failure. In addition  NT-proBNP of <300 pg/ml effectively rules out acute congestive heart failure with 99% negative predictive value.    Hemoglobin A1c [[id number]]  (Abnormal) Collected:  12/31/18 0214    Specimen:  Blood Updated:  12/31/18 0317     Hemoglobin A1C 15.70 %     Narrative:       Hemoglobin A1C Ranges:    Increased Risk for Diabetes  5.7% to 6.4%  Diabetes                     >= 6.5%  Diabetic Goal                < 7.0%    Lactic Acid, Reflex [[id number]]  (Normal) Collected:  12/31/18 0208    Specimen:  Blood Updated:  12/31/18 0316     Lactate 1.3 mmol/L     CBC & Differential [[id number]] Collected:  12/31/18 0208    Specimen:  Blood Updated:  12/31/18 0300    Narrative:       The following orders were created for panel order CBC & Differential.  Procedure                               Abnormality         Status                     ---------                               -----------         ------                     CBC Auto Differential[[id number]]        Abnormal            Final result                 Please view results for these tests on the individual orders.    CBC Auto Differential [[id number]]  (Abnormal) Collected:  12/31/18 0208    Specimen:  Blood Updated:  12/31/18 0300     WBC 19.50 10*3/mm3      RBC 4.97 10*6/mm3      Hemoglobin 15.1 g/dL      Hematocrit 44.0 %      MCV 88.5 fL      MCH 30.4 pg      MCHC 34.3 g/dL      RDW 12.8 %      RDW-SD 40.6 fl      MPV 11.2 fL      Platelets 220 10*3/mm3      Neutrophil % 58.1 %      Lymphocyte % 32.2 %      Monocyte % 8.4 %      Eosinophil % 0.7 %      Basophil % 0.2 %      Immature Grans % 0.4 %      Neutrophils, Absolute 11.36 10*3/mm3      Lymphocytes, Absolute 6.27 10*3/mm3      Monocytes, Absolute 1.63 10*3/mm3      Eosinophils, Absolute 0.14 10*3/mm3      Basophils, Absolute 0.03 10*3/mm3      Immature Grans, Absolute 0.07 10*3/mm3     Insulin, Random [[id number]] Collected:  12/31/18 0208    Specimen:  Blood Updated:  12/31/18 0252    AUSTEN [[id number]] Collected:   12/31/18 0208    Specimen:  Blood Updated:  12/31/18 0251    C-Peptide [[id number]] Collected:  12/31/18 0208    Specimen:  Blood Updated:  12/31/18 0251    Thyroglobulin [[id number]] Collected:  12/31/18 0208    Specimen:  Blood Updated:  12/31/18 0251    Thyroid Peroxidase Antibody [[id number]] Collected:  12/31/18 0208    Specimen:  Blood Updated:  12/31/18 0251    Lactic Acid, Reflex Timer (This will reflex a repeat order 3-3:15 hours after ordered.) [[id number]] Collected:  12/30/18 1957    Specimen:  Blood Updated:  12/30/18 2330     Extra Tube Hold for add-ons.     Comment: Auto resulted.       POC Glucose Once [[id number]]  (Abnormal) Collected:  12/30/18 2105    Specimen:  Blood Updated:  12/30/18 2107     Glucose 483 mg/dL     Lactic Acid, Plasma [[id number]]  (Abnormal) Collected:  12/30/18 1957    Specimen:  Blood Updated:  12/30/18 2020     Lactate 4.2 mmol/L     Phosphorus [[id number]]  (Abnormal) Collected:  12/30/18 1821    Specimen:  Blood Updated:  12/30/18 1953     Phosphorus 4.6 mg/dL     Magnesium [[id number]]  (Normal) Collected:  12/30/18 1821    Specimen:  Blood Updated:  12/30/18 1953     Magnesium 2.0 mg/dL     [place] Draw [[id number]] Collected:  12/30/18 1821    Specimen:  Blood Updated:  12/30/18 1930    Narrative:       The following orders were created for panel order [place] Draw.  Procedure                               Abnormality         Status                     ---------                               -----------         ------                     Light Blue Top[[id number]]                                   Final result               Green Top (Gel)[[id number]]                                  Final result               Lavender Top[[id number]]                                     Final result               Gold Top - SST[[id number]]                                   Final result                 Please view results for these tests on the individual orders.    Green Top (Gel) [[id number]]  Collected:  12/30/18 1821    Specimen:  Blood Updated:  12/30/18 1930     Extra Tube Hold for add-ons.     Comment: Auto resulted.       Lavender Top [[id number]] Collected:  12/30/18 1821    Specimen:  Blood Updated:  12/30/18 1930     Extra Tube hold for add-on     Comment: Auto resulted       Light Blue Top [[id number]] Collected:  12/30/18 1821    Specimen:  Blood Updated:  12/30/18 1930     Extra Tube hold for add-on     Comment: Auto resulted       Gold Top - SST [[id number]] Collected:  12/30/18 1821    Specimen:  Blood Updated:  12/30/18 1930     Extra Tube Hold for add-ons.     Comment: Auto resulted.       Comprehensive Metabolic Panel [[id number]]  (Abnormal) Collected:  12/30/18 1821    Specimen:  Blood Updated:  12/30/18 1918     Glucose 747 mg/dL      BUN 25 mg/dL      Creatinine 1.28 mg/dL      Sodium 124 mmol/L      Potassium 5.3 mmol/L      Chloride 83 mmol/L      CO2 22.4 mmol/L      Calcium 10.1 mg/dL      Total Protein 7.8 g/dL      Albumin 4.00 g/dL      ALT (SGPT) 21 U/L      AST (SGOT) 15 U/L      Alkaline Phosphatase 113 U/L      Total Bilirubin 0.4 mg/dL      eGFR Non African Amer 61 mL/min/1.73      Globulin 3.8 gm/dL      A/G Ratio 1.1 g/dL      BUN/Creatinine Ratio 19.5     Anion Gap 18.6 mmol/L     Ketone Bodies, Serum (Not performed at [facility]) [[id number]] Collected:  12/30/18 1821    Specimen:  Blood Updated:  12/30/18 1909    Narrative:       The following orders were created for panel order Ketone Bodies, Serum (Not performed at [facility]).  Procedure                               Abnormality         Status                     ---------                               -----------         ------                     Beta Hydroxybutyrate Richard...[[id number]]  Abnormal            Final result                 Please view results for these tests on the individual orders.    Beta Hydroxybutyrate Quantitative [[id number]]  (Abnormal) Collected:  12/30/18 1821    Specimen:  Blood Updated:  12/30/18  1909     Beta-Hydroxybutyrate Quant 0.528 mmol/L     Urinalysis With Microscopic If Indicated (No Culture) - Urine, Clean Catch [[id number]]  (Abnormal) Collected:  12/30/18 1843    Specimen:  Urine, Clean Catch Updated:  12/30/18 1900     Color, UA Yellow     Appearance, UA Clear     pH, UA 5.5     Specific Gravity, UA >=1.030     Glucose, UA >=1000 mg/dL (3+)     Ketones, UA Trace     Bilirubin, UA Negative     Blood, UA Negative     Protein, UA Negative     Leuk Esterase, UA Negative     Nitrite, UA Negative     Urobilinogen, UA 0.2 E.U./dL    Narrative:       Urine microscopic not indicated.    CBC & Differential [[id number]] Collected:  12/30/18 1821    Specimen:  Blood Updated:  12/30/18 1858    Narrative:       The following orders were created for panel order CBC & Differential.  Procedure                               Abnormality         Status                     ---------                               -----------         ------                     Scan Slide[[id number]]                                                                  CBC Auto Differential[[id number]]        Abnormal            Final result                 Please view results for these tests on the individual orders.    CBC Auto Differential [[id number]]  (Abnormal) Collected:  12/30/18 1821    Specimen:  Blood Updated:  12/30/18 1858     WBC 16.01 10*3/mm3      RBC 5.59 10*6/mm3      Hemoglobin 16.8 g/dL      Hematocrit 46.6 %      MCV 83.4 fL      MCH 30.1 pg      MCHC 36.1 g/dL      RDW 12.6 %      RDW-SD 38.0 fl      MPV 12.0 fL      Platelets 236 10*3/mm3      Neutrophil % 63.6 %      Lymphocyte % 26.0 %      Monocyte % 10.1 %      Eosinophil % 0.2 %      Basophil % 0.1 %      Immature Grans % 0.6 %      Neutrophils, Absolute 10.18 10*3/mm3      Lymphocytes, Absolute 4.16 10*3/mm3      Monocytes, Absolute 1.61 10*3/mm3      Eosinophils, Absolute 0.04 10*3/mm3      Basophils, Absolute 0.02 10*3/mm3      Immature Grans, Absolute 0.09  10*3/mm3               Imaging Results (last 72 hours)     Procedure Component Value Units Date/Time    CT Guided Biopsy Aspiration Injection [[id number]] Collected:  12/31/18 1648     Updated:  12/31/18 1655    Narrative:       CT GUIDED LEFT LIP COLLECTION ASPIRATION     HISTORY: Left lip abscess     Radiation dose reduction techniques were utilized, including automated  exposure control and exposure modulation based on body size.     Procedure explained to the patient with potential complications. After  signed informed consent was obtained the patient was placed in the  supine position on the CT table. . After appropriate prep and drape of  the skin surface with Betadine, 1% lidocaine for local anesthesia. An  18-gauge needle was advanced into the fluid collection and only 0.5 cc  of mixed thick, bloody and yellow fluid was able to be aspirated.     CONCLUSION: Successful left ligament fluid collection aspiration using  CT guidance. No complications encountered.     This report was finalized on 12/31/2018 4:50 PM by Dr. Kiko Edwards M.D.       CT Facial Bones With Contrast [[id number]] Collected:  12/30/18 2116     Updated:  12/30/18 2126    Narrative:       CT OF THE FACIAL BONES WITH CONTRAST     HISTORY: Left-sided facial swelling     COMPARISON: None available.     TECHNIQUE: Axial CT imaging was obtained from the facial bones following  administration of IV contrast. Coronal and sagittal reformatted images  were obtained.        FINDINGS:  Visualized portions of the brain parenchyma appear unremarkable. Orbits  appear unremarkable. There is a mucous retention cyst seen within the  right maxillary sinus. The remainder of the visualized paranasal sinuses  and mastoid air cells appear clear. There is extensive soft tissue  swelling which is noted overlying the left upper lip. It is associated  with a rim-enhancing collection, characteristic of abscess. The  collection measures at least 2.5 x 1.5 cm. No  extension into the oral  cavity is identified. No aggressive osseous abnormalities are seen. The  nasopharynx and oropharynx, and hypopharynx all appear unremarkable. The  patient does have some enlarged cervical lymph nodes on the left. For  example, one node inferior to the left mandible measures up to 2.4 x 1.0  cm. These are likely reactive, given the adjacent inflammation. No  abnormality is seen on the right.          Impression:          1. Extensive soft tissue swelling seen overlying the left upper lip,  characteristic of cellulitis. In addition, there is a rim-enhancing  collection, which measures at least 2.5 x 1.5 cm, characteristic of  abscess. I do not see any extension into the oral cavity, and no  aggressive osseous abnormalities are seen. Enlarged cervical lymph nodes  are noted on the left, likely reactive.     Radiation dose reduction techniques were utilized, including automated  exposure control and exposure modulation based on body size.     This report was finalized on 12/30/2018 9:23 PM by Dr. Taylor Sarabia M.D.               Medication Review:      Hospital Medications (active)       Dose Frequency Start End    acetaminophen (TYLENOL) tablet 325 mg 325 mg Every 4 Hours PRN 12/30/2018     Sig - Route: Take 1 tablet by mouth Every 4 (Four) Hours As Needed for Mild Pain . - Oral    acetaminophen (TYLENOL) tablet 650 mg 650 mg Every 4 Hours PRN 12/30/2018     Sig - Route: Take 2 tablets by mouth Every 4 (Four) Hours As Needed for Mild Pain . - Oral    atorvastatin (LIPITOR) tablet 20 mg 20 mg Daily 12/31/2018     Sig - Route: Take 1 tablet by mouth Daily. - Oral    cholecalciferol (VITAMIN D3) tablet 5,000 Units 5,000 Units Daily 12/31/2018     Sig - Route: Take 5 tablets by mouth Daily. - Oral    Dapagliflozin Propanediol tablet 10 mg 10 mg Daily 12/31/2018     Sig - Route: Take 10 mg by mouth Daily. - Oral    diazePAM (VALIUM) tablet 10 mg 10 mg Once 12/31/2018 12/31/2018    Sig - Route:  Take 2 tablets by mouth 1 (One) Time. - Oral    enoxaparin (LOVENOX) syringe 40 mg 40 mg Every 12 Hours 12/30/2018     Sig - Route: Inject 0.4 mL under the skin into the appropriate area as directed Every 12 (Twelve) Hours. - Subcutaneous    exenatide (BYETTA) injection 10 mcg 10 mcg Every 12 Hours Scheduled 12/30/2018     Sig - Route: Inject 0.04 mL under the skin into the appropriate area as directed Every 12 (Twelve) Hours. - Subcutaneous    Notes to Pharmacy: Patient is non-compliant at home, and does not have own supply, per conversation with RN in ED.    fenofibrate (TRICOR) tablet 145 mg 145 mg Daily 12/31/2018     Sig - Route: Take 1 tablet by mouth Daily. - Oral    HYDROmorphone (DILAUDID) injection 0.5 mg 0.5 mg Once 12/30/2018 12/30/2018    Sig - Route: Infuse 0.5 mL into a venous catheter 1 (One) Time. - Intravenous    HYDROmorphone (DILAUDID) injection 1 mg 1 mg Once 12/30/2018 12/30/2018    Sig - Route: Infuse 1 mL into a venous catheter 1 (One) Time. - Intravenous    HYDROmorphone (DILAUDID) injection 1 mg 1 mg Every 4 Hours PRN 12/30/2018 1/9/2019    Sig - Route: Infuse 1 mL into a venous catheter Every 4 (Four) Hours As Needed for Severe Pain . - Intravenous    Influenza Vac Subunit Quad (FLUCELVAX) injection 0.5 mL 0.5 mL Once 1/1/2019     Sig - Route: Inject 0.5 mL into the appropriate muscle as directed by prescriber 1 (One) Time. - Intramuscular    insulin lispro (humaLOG) injection 1-20 Units 1-20 Units 3 Times Daily With Meals 12/31/2018     Sig - Route: Inject 1-20 Units under the skin into the appropriate area as directed 3 (Three) Times a Day With Meals. - Subcutaneous    insulin regular (humuLIN R,novoLIN R) injection 10 Units 10 Units Once 12/30/2018 12/30/2018    Sig - Route: Infuse 10 Units into a venous catheter 1 (One) Time. - Intravenous    iopamidol (ISOVUE-300) 61 % injection 100 mL 100 mL Once in Imaging 12/30/2018 12/30/2018    Sig - Route: Infuse 100 mL into a venous catheter  Once. - Intravenous    lidocaine (XYLOCAINE) 1 % injection 20 mL 20 mL Once 12/31/2018 12/31/2018    Sig - Route: Inject 20 mL into the appropriate area of the skin as directed by provider 1 (One) Time. - Intradermal    Cosign for Ordering: Required by Kiko Edwards MD    linagliptin (TRADJENTA) tablet 5 mg 5 mg Daily 12/31/2018     Sig - Route: Take 1 tablet by mouth Daily. - Oral    magnesium sulfate 2g/50 mL (PREMIX) infusion 2 g As Needed 12/30/2018 1/6/2019    Sig - Route: Infuse 50 mL into a venous catheter As Needed (to be given for Magnesium levels 1.8 or less PRN.). - Intravenous    metFORMIN (GLUCOPHAGE) tablet 1,000 mg 1,000 mg 2 Times Daily With Meals 1/2/2019     Sig - Route: Take 1 tablet by mouth 2 (Two) Times a Day With Meals. - Oral    Cosign for Ordering: Accepted by Reyes, Rosenberg Acosta, MD on 12/31/2018 11:03 AM    nebivolol (BYSTOLIC) tablet 5 mg 5 mg Daily 12/31/2018     Sig - Route: Take 1 tablet by mouth Daily. - Oral    nicotine (NICODERM CQ) 14 MG/24HR patch 1 patch 1 patch Every 24 Hours Scheduled 12/31/2018     Sig - Route: Place 1 patch on the skin as directed by provider Daily. - Transdermal    NIFEdipine XL (PROCARDIA XL) 24 hr tablet 90 mg 90 mg Daily 12/31/2018     Sig - Route: Take 90 mg by mouth Daily. - Oral    olmesartan (BENICAR) tablet 40 mg 40 mg Daily 12/31/2018     Sig - Route: Take 1 tablet by mouth Daily. - Oral    ondansetron (ZOFRAN) injection 4 mg 4 mg Once 12/30/2018 12/30/2018    Sig - Route: Infuse 2 mL into a venous catheter 1 (One) Time. - Intravenous    pantoprazole (PROTONIX) EC tablet 40 mg 40 mg Every Morning 12/31/2018     Sig - Route: Take 1 tablet by mouth Every Morning. - Oral    Pharmacy to dose vancomycin  Continuous PRN 12/30/2018 1/20/2019    Sig - Route: Continuous As Needed for Consult. - Does not apply    pioglitazone (ACTOS) tablet 30 mg 30 mg Daily 12/31/2018     Sig - Route: Take 1 tablet by mouth Daily. - Oral     "piperacillin-tazobactam (ZOSYN) 4.5 g in iso-osmotic dextrose 100 mL IVPB (premix) 4.5 g Once 12/30/2018 12/30/2018    Sig - Route: Infuse 100 mL into a venous catheter 1 (One) Time. - Intravenous    potassium chloride (KLOR-CON) packet 40 mEq 40 mEq As Needed 12/30/2018     Sig - Route: Take 40 mEq by mouth As Needed (potassium replacement, see admin instructions). - Oral    Linked Group 1:  \"Or\" Linked Group Details        potassium chloride (MICRO-K) CR capsule 40 mEq 40 mEq As Needed 12/30/2018     Sig - Route: Take 4 capsules by mouth As Needed (potassium replacement.  see admin instructions). - Oral    Linked Group 1:  \"Or\" Linked Group Details        potassium chloride 10 mEq in 100 mL IVPB 10 mEq Every 1 Hour PRN 12/30/2018     Sig - Route: Infuse 100 mL into a venous catheter Every 1 (One) Hour As Needed (potassium protocol PERIPHERAL - see admin instructions). - Intravenous    Linked Group 1:  \"Or\" Linked Group Details        sodium chloride (HYPERTONIC) 3 % infusion 60 mL/hr Once 12/30/2018 12/31/2018    Sig - Route: Infuse 60 mL/hr into a venous catheter 1 (One) Time. - Intravenous    sodium chloride (HYPERTONIC) 3 % infusion 60 mL/hr Once 12/31/2018     Sig - Route: Infuse 60 mL/hr into a venous catheter 1 (One) Time. - Intravenous    sodium chloride 0.9 % bolus 1,000 mL 1,000 mL Once 12/30/2018 12/30/2018    Sig - Route: Infuse 1,000 mL into a venous catheter 1 (One) Time. - Intravenous    sodium chloride 0.9 % flush 10 mL 10 mL Every 12 Hours Scheduled 12/31/2018     Sig - Route: Infuse 10 mL into a venous catheter Every 12 (Twelve) Hours. - Intravenous    sodium chloride 0.9 % flush 10 mL 10 mL Every 12 Hours Scheduled 12/31/2018     Sig - Route: Infuse 10 mL into a venous catheter Every 12 (Twelve) Hours. - Intravenous    sodium chloride 0.9 % flush 10 mL 10 mL As Needed 12/31/2018     Sig - Route: Infuse 10 mL into a venous catheter As Needed for Line Care (After Medication Administration). - " Intravenous    sodium chloride 0.9 % flush 20 mL 20 mL As Needed 12/31/2018     Sig - Route: Infuse 20 mL into a venous catheter As Needed for Line Care (After Blood Draws or Blood Product Administration). - Intravenous    sodium chloride 0.9 % flush 3 mL 3 mL Every 12 Hours Scheduled 12/30/2018     Sig - Route: Infuse 3 mL into a venous catheter Every 12 (Twelve) Hours. - Intravenous    sodium chloride 0.9 % flush 3-10 mL 3-10 mL As Needed 12/30/2018     Sig - Route: Infuse 3-10 mL into a venous catheter As Needed for Line Care. - Intravenous    sodium chloride 0.9% - IBW for BMI > 30 bolus 2,259 mL 30 mL/kg × 75.3 kg (Ideal) Once 12/30/2018 12/30/2018    Sig - Route: Infuse 2,259 mL into a venous catheter 1 (One) Time. - Intravenous    vancomycin 1500 mg/500 mL 0.9% NS IVPB ([facility]) 10 mg/kg × 139 kg Every 8 Hours 12/31/2018 1/21/2019    Sig - Route: Infuse 500 mL into a venous catheter Every 8 (Eight) Hours. - Intravenous    vancomycin 2750 mg/500 mL 0.9% NS IVPB ([facility]) 20 mg/kg × 139 kg Once 12/30/2018 12/30/2018    Sig - Route: Infuse 500 mL into a venous catheter 1 (One) Time. - Intravenous    zolpidem (AMBIEN) tablet 5 mg 5 mg Nightly PRN 12/30/2018 1/9/2019    Sig - Route: Take 1 tablet by mouth At Night As Needed for Sleep. - Oral    amLODIPine (NORVASC) tablet 10 mg (Discontinued) 10 mg Daily 12/31/2018 12/30/2018    Sig - Route: Take 2 tablets by mouth Daily. - Oral    metFORMIN (GLUCOPHAGE) tablet 1,000 mg (Discontinued) 1,000 mg 2 Times Daily With Meals 12/31/2018 12/31/2018    Sig - Route: Take 1 tablet by mouth 2 (Two) Times a Day With Meals. - Oral    metFORMIN (GLUCOPHAGE) tablet 1,000 mg (Discontinued) 1,000 mg 2 Times Daily With Meals 1/2/2019 12/31/2018    Sig - Route: Take 1 tablet by mouth 2 (Two) Times a Day With Meals. - Oral    Reason for Discontinue: Duplicate order    valsartan (DIOVAN) tablet 320 mg (Discontinued) 320 mg Every 24 Hours Scheduled 12/31/2018 12/30/2018    Sig - Route: Take 1  tablet by mouth Daily. - Oral          Assessment/Plan         Cellulitis of left upper lip with abscess formation with sepsis    Azotemia    Glucosuria    Dehydration    Hyperkalemia    Hyponatremia    Hyperglycemia    Labile hypertension    Uncontrolled type 2 diabetes mellitus with hyperglycemia (CMS/HCC)          Plan :    IV vanc and zosyn  C&S   CT head and sinuses  Thank you    Dinesh Carter MD  12/31/18  7:04 PM

## 2019-01-01 NOTE — PLAN OF CARE
Problem: Patient Care Overview  Goal: Plan of Care Review  Outcome: Ongoing (interventions implemented as appropriate)   01/01/19 0308   Coping/Psychosocial   Plan of Care Reviewed With patient   Plan of Care Review   Progress no change   OTHER   Outcome Summary pt c/o pain in face and head. left side of face remains swollen. mri this am. will continue to monitor.      Goal: Individualization and Mutuality  Outcome: Ongoing (interventions implemented as appropriate)    Goal: Discharge Needs Assessment  Outcome: Ongoing (interventions implemented as appropriate)      Problem: Pain, Acute (Adult)  Goal: Identify Related Risk Factors and Signs and Symptoms  Outcome: Outcome(s) achieved Date Met: 01/01/19    Goal: Acceptable Pain Control/Comfort Level  Outcome: Ongoing (interventions implemented as appropriate)      Problem: Fall Risk (Adult)  Goal: Identify Related Risk Factors and Signs and Symptoms  Outcome: Outcome(s) achieved Date Met: 01/01/19    Goal: Absence of Fall  Outcome: Ongoing (interventions implemented as appropriate)      Problem: Skin Injury Risk (Adult)  Goal: Identify Related Risk Factors and Signs and Symptoms  Outcome: Outcome(s) achieved Date Met: 01/01/19    Goal: Skin Health and Integrity  Outcome: Ongoing (interventions implemented as appropriate)      Problem: Infection, Risk/Actual (Adult)  Goal: Identify Related Risk Factors and Signs and Symptoms  Outcome: Outcome(s) achieved Date Met: 01/01/19    Goal: Infection Prevention/Resolution  Outcome: Ongoing (interventions implemented as appropriate)      Problem: Diabetes, Type 2 (Adult)  Goal: Signs and Symptoms of Listed Potential Problems Will be Absent, Minimized or Managed (Diabetes, Type 2)  Outcome: Ongoing (interventions implemented as appropriate)

## 2019-01-02 LAB
ALBUMIN SERPL-MCNC: 3 G/DL (ref 3.5–5.2)
ALBUMIN/GLOB SERPL: 1.1 G/DL
ALP SERPL-CCNC: 86 U/L (ref 39–117)
ALT SERPL W P-5'-P-CCNC: 15 U/L (ref 1–41)
ANA SER QL: NEGATIVE
ANION GAP SERPL CALCULATED.3IONS-SCNC: 12.6 MMOL/L
AST SERPL-CCNC: 28 U/L (ref 1–40)
BASOPHILS # BLD AUTO: 0.06 10*3/MM3 (ref 0–0.2)
BASOPHILS NFR BLD AUTO: 0.5 % (ref 0–1.5)
BILIRUB SERPL-MCNC: 0.2 MG/DL (ref 0.1–1.2)
BUN BLD-MCNC: 10 MG/DL (ref 6–20)
BUN/CREAT SERPL: 15.6 (ref 7–25)
C PEPTIDE SERPL-MCNC: 3.7 NG/ML (ref 1.1–4.4)
CALCIUM SPEC-SCNC: 8.3 MG/DL (ref 8.6–10.5)
CHLORIDE SERPL-SCNC: 95 MMOL/L (ref 98–107)
CO2 SERPL-SCNC: 25.4 MMOL/L (ref 22–29)
CREAT BLD-MCNC: 0.64 MG/DL (ref 0.76–1.27)
DEPRECATED RDW RBC AUTO: 41.6 FL (ref 37–54)
EOSINOPHIL # BLD AUTO: 0.28 10*3/MM3 (ref 0–0.7)
EOSINOPHIL NFR BLD AUTO: 2.4 % (ref 0.3–6.2)
ERYTHROCYTE [DISTWIDTH] IN BLOOD BY AUTOMATED COUNT: 12.7 % (ref 11.5–14.5)
GFR SERPL CREATININE-BSD FRML MDRD: 135 ML/MIN/1.73
GLOBULIN UR ELPH-MCNC: 2.7 GM/DL
GLUCOSE BLD-MCNC: 347 MG/DL (ref 65–99)
GLUCOSE BLDC GLUCOMTR-MCNC: 265 MG/DL (ref 70–130)
GLUCOSE BLDC GLUCOMTR-MCNC: 281 MG/DL (ref 70–130)
GLUCOSE BLDC GLUCOMTR-MCNC: 336 MG/DL (ref 70–130)
GLUCOSE BLDC GLUCOMTR-MCNC: 365 MG/DL (ref 70–130)
HCT VFR BLD AUTO: 42.2 % (ref 40.4–52.2)
HGB BLD-MCNC: 14.3 G/DL (ref 13.7–17.6)
IMM GRANULOCYTES # BLD AUTO: 0.07 10*3/MM3 (ref 0–0.03)
IMM GRANULOCYTES NFR BLD AUTO: 0.6 % (ref 0–0.5)
LYMPHOCYTES # BLD AUTO: 4.4 10*3/MM3 (ref 0.9–4.8)
LYMPHOCYTES NFR BLD AUTO: 38.2 % (ref 19.6–45.3)
MAGNESIUM SERPL-MCNC: 1.9 MG/DL (ref 1.6–2.6)
MCH RBC QN AUTO: 30.5 PG (ref 27–32.7)
MCHC RBC AUTO-ENTMCNC: 33.9 G/DL (ref 32.6–36.4)
MCV RBC AUTO: 90 FL (ref 79.8–96.2)
MONOCYTES # BLD AUTO: 0.93 10*3/MM3 (ref 0.2–1.2)
MONOCYTES NFR BLD AUTO: 8.1 % (ref 5–12)
NEUTROPHILS # BLD AUTO: 5.77 10*3/MM3 (ref 1.9–8.1)
NEUTROPHILS NFR BLD AUTO: 50.2 % (ref 42.7–76)
PLATELET # BLD AUTO: 225 10*3/MM3 (ref 140–500)
PMV BLD AUTO: 11.4 FL (ref 6–12)
POTASSIUM BLD-SCNC: 3.8 MMOL/L (ref 3.5–5.2)
PROT SERPL-MCNC: 5.7 G/DL (ref 6–8.5)
RBC # BLD AUTO: 4.69 10*6/MM3 (ref 4.6–6)
SODIUM BLD-SCNC: 133 MMOL/L (ref 136–145)
WBC NRBC COR # BLD: 11.51 10*3/MM3 (ref 4.5–10.7)

## 2019-01-02 PROCEDURE — 80053 COMPREHEN METABOLIC PANEL: CPT | Performed by: FAMILY MEDICINE

## 2019-01-02 PROCEDURE — 25010000002 ENOXAPARIN PER 10 MG: Performed by: FAMILY MEDICINE

## 2019-01-02 PROCEDURE — 63710000001 INSULIN GLARGINE PER 5 UNITS: Performed by: FAMILY MEDICINE

## 2019-01-02 PROCEDURE — 83735 ASSAY OF MAGNESIUM: CPT | Performed by: FAMILY MEDICINE

## 2019-01-02 PROCEDURE — 82962 GLUCOSE BLOOD TEST: CPT

## 2019-01-02 PROCEDURE — 25010000002 HYDROMORPHONE 1 MG/ML SOLUTION: Performed by: FAMILY MEDICINE

## 2019-01-02 PROCEDURE — 63710000001 INSULIN LISPRO (HUMAN) PER 5 UNITS: Performed by: FAMILY MEDICINE

## 2019-01-02 PROCEDURE — 85025 COMPLETE CBC W/AUTO DIFF WBC: CPT | Performed by: FAMILY MEDICINE

## 2019-01-02 PROCEDURE — 25010000003 AMPICILLIN-SULBACTAM PER 1.5 G: Performed by: INTERNAL MEDICINE

## 2019-01-02 PROCEDURE — 36415 COLL VENOUS BLD VENIPUNCTURE: CPT | Performed by: FAMILY MEDICINE

## 2019-01-02 RX ORDER — INSULIN GLARGINE 100 [IU]/ML
30 INJECTION, SOLUTION SUBCUTANEOUS EVERY 12 HOURS SCHEDULED
Status: DISCONTINUED | OUTPATIENT
Start: 2019-01-02 | End: 2019-01-03

## 2019-01-02 RX ADMIN — AMPICILLIN SODIUM AND SULBACTAM SODIUM 3 G: 2; 1 INJECTION, POWDER, FOR SOLUTION INTRAMUSCULAR; INTRAVENOUS at 20:20

## 2019-01-02 RX ADMIN — HYDROMORPHONE HYDROCHLORIDE 1 MG: 1 INJECTION, SOLUTION INTRAMUSCULAR; INTRAVENOUS; SUBCUTANEOUS at 18:44

## 2019-01-02 RX ADMIN — Medication 3 ML: at 21:07

## 2019-01-02 RX ADMIN — AMPICILLIN SODIUM AND SULBACTAM SODIUM 3 G: 2; 1 INJECTION, POWDER, FOR SOLUTION INTRAMUSCULAR; INTRAVENOUS at 15:08

## 2019-01-02 RX ADMIN — ACETAMINOPHEN 650 MG: 325 TABLET, FILM COATED ORAL at 04:18

## 2019-01-02 RX ADMIN — ENOXAPARIN SODIUM 40 MG: 40 INJECTION SUBCUTANEOUS at 21:56

## 2019-01-02 RX ADMIN — HYDROMORPHONE HYDROCHLORIDE 1 MG: 1 INJECTION, SOLUTION INTRAMUSCULAR; INTRAVENOUS; SUBCUTANEOUS at 22:41

## 2019-01-02 RX ADMIN — INSULIN GLARGINE 30 UNITS: 100 INJECTION, SOLUTION SUBCUTANEOUS at 21:56

## 2019-01-02 RX ADMIN — SODIUM CHLORIDE, PRESERVATIVE FREE 10 ML: 5 INJECTION INTRAVENOUS at 09:28

## 2019-01-02 RX ADMIN — INSULIN LISPRO 13 UNITS: 100 INJECTION, SOLUTION INTRAVENOUS; SUBCUTANEOUS at 08:41

## 2019-01-02 RX ADMIN — FENOFIBRATE 145 MG: 145 TABLET ORAL at 08:41

## 2019-01-02 RX ADMIN — HYDROMORPHONE HYDROCHLORIDE 1 MG: 1 INJECTION, SOLUTION INTRAMUSCULAR; INTRAVENOUS; SUBCUTANEOUS at 04:15

## 2019-01-02 RX ADMIN — AMPICILLIN SODIUM AND SULBACTAM SODIUM 3 G: 2; 1 INJECTION, POWDER, FOR SOLUTION INTRAMUSCULAR; INTRAVENOUS at 02:35

## 2019-01-02 RX ADMIN — HYDROCODONE BITARTRATE AND ACETAMINOPHEN 1 TABLET: 7.5; 325 TABLET ORAL at 02:35

## 2019-01-02 RX ADMIN — HYDROMORPHONE HYDROCHLORIDE 1 MG: 1 INJECTION, SOLUTION INTRAMUSCULAR; INTRAVENOUS; SUBCUTANEOUS at 08:42

## 2019-01-02 RX ADMIN — ENOXAPARIN SODIUM 40 MG: 40 INJECTION SUBCUTANEOUS at 02:35

## 2019-01-02 RX ADMIN — PANTOPRAZOLE SODIUM 40 MG: 40 TABLET, DELAYED RELEASE ORAL at 06:40

## 2019-01-02 RX ADMIN — HYDROCODONE BITARTRATE AND ACETAMINOPHEN 1 TABLET: 7.5; 325 TABLET ORAL at 11:33

## 2019-01-02 RX ADMIN — HYDROCODONE BITARTRATE AND ACETAMINOPHEN 1 TABLET: 7.5; 325 TABLET ORAL at 15:30

## 2019-01-02 RX ADMIN — INSULIN LISPRO 7 UNITS: 100 INJECTION, SOLUTION INTRAVENOUS; SUBCUTANEOUS at 11:32

## 2019-01-02 RX ADMIN — HYDROCODONE BITARTRATE AND ACETAMINOPHEN 1 TABLET: 7.5; 325 TABLET ORAL at 20:20

## 2019-01-02 RX ADMIN — Medication 3 ML: at 09:28

## 2019-01-02 RX ADMIN — ZOLPIDEM TARTRATE 5 MG: 5 TABLET, FILM COATED ORAL at 22:44

## 2019-01-02 RX ADMIN — NIFEDIPINE 90 MG: 60 TABLET, FILM COATED, EXTENDED RELEASE ORAL at 08:41

## 2019-01-02 RX ADMIN — NEBIVOLOL HYDROCHLORIDE 5 MG: 5 TABLET ORAL at 08:41

## 2019-01-02 RX ADMIN — PIOGLITAZONE 30 MG: 30 TABLET ORAL at 08:41

## 2019-01-02 RX ADMIN — INSULIN LISPRO 13 UNITS: 100 INJECTION, SOLUTION INTRAVENOUS; SUBCUTANEOUS at 17:44

## 2019-01-02 RX ADMIN — ATORVASTATIN CALCIUM 20 MG: 20 TABLET, FILM COATED ORAL at 08:41

## 2019-01-02 RX ADMIN — AMPICILLIN SODIUM AND SULBACTAM SODIUM 3 G: 2; 1 INJECTION, POWDER, FOR SOLUTION INTRAMUSCULAR; INTRAVENOUS at 08:41

## 2019-01-02 RX ADMIN — VITAMIN D, TAB 1000IU (100/BT) 5000 UNITS: 25 TAB at 08:41

## 2019-01-02 RX ADMIN — LINAGLIPTIN 5 MG: 5 TABLET, FILM COATED ORAL at 08:41

## 2019-01-02 NOTE — PROGRESS NOTES
Graham Meléndez  48 y.o.  1970  [id number]  [id number]  01/02/19     LOS: 3 days   Patient Care Team:  Reyes, Rosenberg Acosta, MD as PCP - General (Family Medicine)  Reyes, Rosenberg Acosta, MD (Family Medicine)    Chief Complaint   Patient presents with   • Hyperglycemia     pt c/o syncope and right sided facial swelling     Subjective   He states that he is in a lot of pain and needs the Dilaudid back until the infection heels.    Objective     LABORATORY DATA:  Lab Results (last 24 hours)     Procedure Component Value Units Date/Time    Comprehensive Metabolic Panel [[id number]] Collected:  01/02/19 0708    Specimen:  Blood Updated:  01/02/19 0718    CBC & Differential [[id number]] Collected:  01/02/19 0532    Specimen:  Blood Updated:  01/02/19 0640    Narrative:       The following orders were created for panel order CBC & Differential.  Procedure                               Abnormality         Status                     ---------                               -----------         ------                     CBC Auto Differential[[id number]]        Abnormal            Final result                 Please view results for these tests on the individual orders.    CBC Auto Differential [[id number]]  (Abnormal) Collected:  01/02/19 0532    Specimen:  Blood Updated:  01/02/19 0640     WBC 11.51 10*3/mm3      RBC 4.69 10*6/mm3      Hemoglobin 14.3 g/dL      Hematocrit 42.2 %      MCV 90.0 fL      MCH 30.5 pg      MCHC 33.9 g/dL      RDW 12.7 %      RDW-SD 41.6 fl      MPV 11.4 fL      Platelets 225 10*3/mm3      Neutrophil % 50.2 %      Lymphocyte % 38.2 %      Monocyte % 8.1 %      Eosinophil % 2.4 %      Basophil % 0.5 %      Immature Grans % 0.6 %      Neutrophils, Absolute 5.77 10*3/mm3      Lymphocytes, Absolute 4.40 10*3/mm3      Monocytes, Absolute 0.93 10*3/mm3      Eosinophils, Absolute 0.28 10*3/mm3      Basophils, Absolute 0.06 10*3/mm3      Immature Grans, Absolute 0.07 10*3/mm3     Magnesium [[id number]]   (Normal) Collected:  01/02/19 0532    Specimen:  Blood Updated:  01/02/19 0618     Magnesium 1.9 mg/dL     POC Glucose Once [[id number]]  (Abnormal) Collected:  01/02/19 0607    Specimen:  Blood Updated:  01/02/19 0609     Glucose 365 mg/dL     POC Glucose Once [[id number]]  (Abnormal) Collected:  01/01/19 2050    Specimen:  Blood Updated:  01/01/19 2052     Glucose 397 mg/dL     Blood Culture - Blood, Arm, Left [[id number]] Collected:  12/30/18 1928    Specimen:  Blood from Arm, Left Updated:  01/01/19 1930     Blood Culture No growth at 2 days    Blood Culture - Blood, Arm, Right [[id number]] Collected:  12/30/18 1837    Specimen:  Blood from Arm, Right Updated:  01/01/19 1900     Blood Culture No growth at 2 days    POC Glucose Once [[id number]]  (Abnormal) Collected:  01/01/19 1651    Specimen:  Blood Updated:  01/01/19 1654     Glucose 407 mg/dL     POC Glucose Once [[id number]]  (Abnormal) Collected:  01/01/19 1151    Specimen:  Blood Updated:  01/01/19 1153     Glucose 333 mg/dL     Body Fluid Culture - Aspirate, Face [[id number]]  (Abnormal) Collected:  12/31/18 1635    Specimen:  Aspirate from Face Updated:  01/01/19 1041     BF Culture Moderate growth (3+) Streptococcus agalactiae (Group B)     Comment: This organism is considered to be universally susceptible to penicillin.  No further antibiotic testing will be performed.        Gram Stain Few (2+) Gram positive cocci      Rare (1+) WBCs per low power field      RADIOGRAPHIC DATA:    See Previous Notes.    Vital Signs  Temp:  [98.1 °F (36.7 °C)-99 °F (37.2 °C)] 98.1 °F (36.7 °C)  Heart Rate:  [77-83] 78  Resp:  [16-18] 18  BP: (106-135)/(67-77) 106/67    Physical Exam  PHYSICAL EXAM:    VITAL SIGNS:  T Max  98.1  KS  78  RR  18  B/P  106/67  BMI  42.9.    GENERAL:  Fairly nourish.  Fairly developed.  Morbidly obese.  Fair Affect.    SKIN:  Not diaphoretic.  Fair skin turgor.  Not jaundice.  Swollen, red, and warm of left maxillary and left upper lip has  decrease and is better.    HEENMT:  Normocephalic/Atraumatic.  Pinkish palpebral conjunctiva.  Anicteric sclerae.  PERRLA.  EOMI.  Oral mucosal membrane are moist.  Pharynx is not red.    NECK:  Supple.  No JVD.  No bruits.    THORAX AND LUNGS:  Clear to Auscultation.  No rhonchi, wheeze, nor rales.  No tenderness upon deep palpation of the costochondral junction.    CARDIOVASCULAR SYSTEM:  Regular rate and rhythm, no murmur.  Normal S1 / S2.  No S3 / S4.  No heaves.    ABDOMEN:  No Hepatosplenomegaly.  Soft.  Not tender.  Not distended.  Positive  bowel sounds.    EXTREMITIES:  No cyanosis, clubbing, nor edema.  No calf tenderness.    CNS:  Alert.  Oriented x 3.  Cranial nerves are intact.  Sensory / Motor are intact.     Results Review:     I reviewed the patient's new clinical results.  Discussed with patient and sister.    Assessment/Plan       0)  Cellulitis of Left Upper Lip with Abscess Formation due to Streptococcus agalactiae (Group B) with Sepsis.    1)  Azotemia.    2)  Glucosuria.    3)  Dehydration.    4)  Hyperkalemia.    5)  Hyponatremia.    6)  Hyperglycemia.    7)  Disequilibrium.    8)  Hypothyroidism.    9)  Syncopal Episode.    10)  Labile Hypertension.    11)  Chest Pain with Tightness.    12)  Uncontrolled Diabetes Mellitus Type 2.    13)  History of Sleep Apnea.    14)  History of Hypertension.    15)  History of Hyperlipidemia.    16)  History of Tobacco Abuse.    17)  History of Myofascial Pain.    18)  History of Allergic Rhinitis.    19)  History of Vitamin D Deficiency.    20)  History of Hypertriglyceridemia.    21)  History of Anxiety and Depression.    22)  History of Right Temporal Carbuncle.    23)  History of Gastroesophageal Reflux Disease.    24)  History of Low Back Pain with Radiculopathy.    25)  History of Uncontrolled Diabetes Mellitus Type 2.    PLAN:  Will compute for his insulin and start him on levemir and restart Dilaudid but at Q4-6 hours.      Cellulitis of left upper  lip with abscess formation with sepsis    Azotemia    Glucosuria    Dehydration    Hyperkalemia    Hyponatremia    Hyperglycemia    Labile hypertension    Uncontrolled type 2 diabetes mellitus with hyperglycemia (CMS/HCC)    Rosenberg Acosta Reyes, MD  01/02/19  8:15 AM

## 2019-01-02 NOTE — PAYOR COMM NOTE
"Maria D Haywood (48 y.o. Male)     PLEASE SEE ATTACHED NOTED.     REF#[id number]    PLEASE CALL   OR  [phone] WITH CONTINUED STAY AUTH.     THANK YOU    CHRISTOPHER MAYS LPN CCP    Date of Birth Social Security Number Address Home Phone MRN    1970  [id number]  [address]  [facility] [address] [phone] [id number]    Yazidism Marital Status          None Single       Admission Date Admission Type Admitting Provider Attending Provider Department, Room/Bed    12/30/18 Emergency Reyes, Rosenberg Acosta, MD Reyes, Rosenberg Acosta, MD [address], [address]/1    Discharge Date Discharge Disposition Discharge Destination                       Attending Provider:  Reyes, Rosenberg Acosta, MD    Allergies:  No Known Allergies    Isolation:  None   Infection:  None   Code Status:  CPR    Ht:  180.3 cm (71\")   Wt:  138 kg (305 lb 3.2 oz)    Admission Cmt:  None   Principal Problem:  Cellulitis of left upper lip with abscess formation with sepsis [K12.2]                 Active Insurance as of 12/30/2018     Primary Coverage     Payor Plan Insurance Group Employer/Plan Group    PASSPORT PASSPORT MEDICAID     Payor Plan Address Payor Plan Phone Number Payor Plan Fax Number Effective Dates    [address] [phone]  11/1/1997 - None Entered    [facility] [id number]       Subscriber Name Subscriber Birth Date Member ID       MARIA D HAYWOOD 1970 [id number]                 Emergency Contacts      (Rel.) Home Phone Work Phone Mobile Phone    Melody Haywood (Mother) [phone] -- [phone]              Intake & Output (last day)       01/01 0701 - 01/02 0700 01/02 0701 - 01/03 0700    P.O. 720 240    Total Intake(mL/kg) 720 (5.2) 240 (1.7)    Urine (mL/kg/hr)      Total Output      Net +720 +240              Lines, Drains & Airways    Active LDAs     Name:   Placement date:   Placement time:   Site:   Days:    Peripheral IV 01/02/19 1218 " Anterior;Left;Upper Arm   01/02/19    1218    Arm   less than 1                Orders (last 24 hrs)     Start     Ordered    12/31/18 0700  POC Glucose 4x Daily AC & at Bedtime  4 Times Daily Before Meals & at Bedtime     Comments:  Use the sliding scale that I gave you, please.      12/30/18 2302    Unscheduled  Up in Chair  As Needed      12/30/18 2225    Unscheduled  PICC LINE CARE - Change Dressing As Needed When Damp, Loose or Soiled  As Needed      12/31/18 0744    Unscheduled  PICC LINE CARE - Change Needleless Connectors  As Needed     Comments:  Per [facility] Policy    12/31/18 0744    Unscheduled  Continuous Pulse Oximetry  As Needed      12/31/18 1924    Unscheduled  Pain Assessment  As Needed      12/31/18 1924    Unscheduled  Oxygen Therapy-  Continuous PRN      12/31/18 1924        Physician Progress Notes (last 24 hours) (Notes from 1/1/2019  4:33 PM through 1/2/2019  4:33 PM)      Dinesh Carter MD at 1/2/2019  1:08 PM               LOS: 3 days   Patient Care Team:  Reyes, Rosenberg Acosta, MD as PCP - General (Family Medicine)  Reyes, Rosenberg Acosta, MD (Family Medicine)        Subjective     Interval History:     Patient Complaints:   Patient Denies:  NVD       Review of Systems:    facila pain    Objective     Vital Signs  Temp:  [98.1 °F (36.7 °C)-99 °F (37.2 °C)] 98.1 °F (36.7 °C)  Heart Rate:  [77-83] 78  Resp:  [16-18] 18  BP: (106-135)/(67-76) 106/67    Physical Exam:     General Appearance:    Alert, cooperative, in no acute distress   Head:    Normocephalic, without obvious abnormality, atraumatic   Eyes:            Lids and lashes normal, conjunctivae and sclerae normal, no   icterus, no pallor, corneas clear, PERRLA   Ears:    Ears appear intact with no abnormalities noted   Throat:   No oral lesions, no thrush, oral mucosa moist  L face better   Neck:   No adenopathy, supple, trachea midline, no thyromegaly, no   carotid bruit, no JVD   Back:     No kyphosis present, no scoliosis present,  no skin lesions,      erythema or scars, no tenderness to percussion or                   palpation,   range of motion normal   Lungs:     Clear to auscultation,respirations regular, even and                  unlabored    Heart:    Regular rhythm and normal rate, normal S1 and S2, no            murmur, no gallop, no rub, no click   Chest Wall:    No abnormalities observed   Abdomen:     Normal bowel sounds, no masses, no organomegaly, soft        non-tender, non-distended, no guarding, no rebound                tenderness   Rectal:     Deferred   Extremities:   Moves all extremities well, no edema, no cyanosis, no             redness   Pulses:   Pulses palpable and equal bilaterally   Skin:   No bleeding, bruising or rash   Lymph nodes:   No palpable adenopathy   Neurologic:   Cranial nerves 2 - 12 grossly intact, sensation intact, DTR       present and equal bilaterally          Assessment/Plan         Cellulitis of left upper lip with abscess formation with sepsis    Azotemia    Glucosuria    Dehydration    Hyperkalemia    Hyponatremia    Hyperglycemia    Labile hypertension    Uncontrolled type 2 diabetes mellitus with hyperglycemia (CMS/[facility])      C&S  Strep B      Plan :    IV Unasyn  Home soon on 10 days of PO Augmentin  875 mg BID    Dinesh Carter MD  01/02/19  1:08 PM          Electronically signed by Dinesh Carter MD at 1/2/2019  1:08 PM     VarshaCarlos Jr., MD at 1/1/2019  9:22 PM        Patient was seen yesterday in consultation with expected MRSA as 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  I ordered a CT-guided aspiration of a small fluid collection which returned 0.5 cc but was enough to get a culture showing 3+ Streptococcus agalactiae (Group B) sensitive to penicillin.  Dr Carter from infectious disease discontinued the patient's vancomycin and Zosyn and in its place started the patient on Unasyn with plans for discharge and a day or 2 on oral  Augmentin.  The patient reports better pain control.     Examination today shows much less erythema and swelling of the left temple cheek and left upper lip but the left upper lip is the most swollen of all sites.  This is not tense swelling is no discharge at the present time.    White count has decreased from a emergency room value of 19 on 12/31/20 value today of 13.4    Plan: Continue Unasyn IV until discontinued by Dr. Carter.            Continue warm compresses 5 minutes every hour and when necessary.             I do not see any current surgical indications but will see him tomorrow to assess his progress and possible discharge            Planning.    JWD    Electronically signed by Carlos Maddox Jr., MD at 1/1/2019  9:32 PM     Dinesh Carter MD at 1/1/2019  5:14 PM               LOS: 2 days   Patient Care Team:  Reyes, Rosenberg Acosta, MD as PCP - General (Family Medicine)  Reyes, Rosenberg Acosta, MD (Family Medicine)        Subjective     Interval History:     Patient Complaints:   Patient Denies:  NVD       Review of Systems:    facila pain    Objective     Vital Signs  Temp:  [97.8 °F (36.6 °C)-99.4 °F (37.4 °C)] 97.8 °F (36.6 °C)  Heart Rate:  [77-83] 83  Resp:  [16] 16  BP: (110-135)/(71-77) 135/76    Physical Exam:     General Appearance:    Alert, cooperative, in no acute distress   Head:    Normocephalic, without obvious abnormality, atraumatic   Eyes:            Lids and lashes normal, conjunctivae and sclerae normal, no   icterus, no pallor, corneas clear, PERRLA   Ears:    Ears appear intact with no abnormalities noted   Throat:   No oral lesions, no thrush, oral mucosa moist  L face better   Neck:   No adenopathy, supple, trachea midline, no thyromegaly, no   carotid bruit, no JVD   Back:     No kyphosis present, no scoliosis present, no skin lesions,      erythema or scars, no tenderness to percussion or                   palpation,   range of motion normal   Lungs:     Clear to  auscultation,respirations regular, even and                  unlabored    Heart:    Regular rhythm and normal rate, normal S1 and S2, no            murmur, no gallop, no rub, no click   Chest Wall:    No abnormalities observed   Abdomen:     Normal bowel sounds, no masses, no organomegaly, soft        non-tender, non-distended, no guarding, no rebound                tenderness   Rectal:     Deferred   Extremities:   Moves all extremities well, no edema, no cyanosis, no             redness   Pulses:   Pulses palpable and equal bilaterally   Skin:   No bleeding, bruising or rash   Lymph nodes:   No palpable adenopathy   Neurologic:   Cranial nerves 2 - 12 grossly intact, sensation intact, DTR       present and equal bilaterally            Assessment/Plan         Cellulitis of left upper lip with abscess formation with sepsis    Azotemia    Glucosuria    Dehydration    Hyperkalemia    Hyponatremia    Hyperglycemia    Labile hypertension    Uncontrolled type 2 diabetes mellitus with hyperglycemia (CMS/[facility])      C&S  Strep B      Plan :    DC vanc and zosyn  IV Unasyn  Home soon on 10 days of PO Augmentin  875 mg BID    Dinesh Carter MD  01/01/19  5:15 PM          Electronically signed by Dinesh Carter MD at 1/1/2019  5:16 PM           All medication doses during the admission are shown, including meds that are no longer on order.   Scheduled Meds Sorted by Name   for Graham Meléndez as of 1/2/19 through 1/2/19     1 Day 3 Days 7 Days 10 Days < Today >    Legend:                          Inactive     Active     Other Encounter    Linked               Medications 01/02/19   amLODIPine (NORVASC) tablet 10 mg   Dose: 10 mg  Freq: Daily Route: PO  Start: 12/31/18 0900 End: 12/30/18 7599    Admin Instructions:   Caution: Look alike/sound alike drug alert. Avoid grapefruit juice.       ampicillin-sulbactam (UNASYN) injection 3 g   Dose: 3 g  Freq: Every 6 Hours Route: IM  Indications of Use: SKIN AND SOFT TISSUE  INFECTION  Start: 01/01/19 1815 End: 01/01/19 2023       ampicillin-sulbactam 3 g/100 mL 0.9% NS (MBP)   Dose: 3 g  Freq: Every 6 Hours Route: IV  Indications of Use: SKIN AND SOFT TISSUE INFECTION  Start: 01/01/19 2115 End: 01/06/19 2114    0235   0841   1508   2115                    atorvastatin (LIPITOR) tablet 20 mg   Dose: 20 mg  Freq: Daily Route: PO  Start: 12/31/18 0900    Admin Instructions:   Avoid grapefruit juice.    0841                          cholecalciferol (VITAMIN D3) tablet 5,000 Units   Dose: 5,000 Units  Freq: Daily Route: PO  Start: 12/31/18 0900    0841                          Dapagliflozin Propanediol tablet 10 mg   Dose: 10 mg  Freq: Daily Route: PO  Start: 12/31/18 0900    Order specific questions:   Drug Name: Farxiga    (1003)                          diazePAM (VALIUM) tablet 10 mg   Dose: 10 mg  Freq: Once Route: PO  Start: 12/31/18 1600 End: 12/31/18 1555    Admin Instructions:   One time dose for CT guided asp   Caution: Look alike/sound alike drug alert. Avoid use with Jeremias's Wort. Avoid grapefruit juice.       enoxaparin (LOVENOX) syringe 40 mg   Dose: 40 mg  Freq: Every 12 Hours Route: SC  Indications of Use: PROPHYLAXIS OF VENOUS THROMBOEMBOLISM  Start: 12/30/18 2251    Admin Instructions:   Give subcutaneous in abdomen only. Do not massage site after injection. Do not change dose time until after 48 hrs.    [phone]                      exenatide (BYETTA) injection 10 mcg   Dose: 10 mcg  Freq: Every 12 Hours Scheduled Route: SC  Start: 12/30/18 5762 [phone] 8664                        fenofibrate (TRICOR) tablet 145 mg   Dose: 145 mg  Freq: Daily Route: PO  Start: 12/31/18 0900    Admin Instructions:   Take with food.    Order specific questions:   Reason for continuation of original order Elevated triglycerides and pancreatitis    0841                          gadobenate dimeglumine (MULTIHANCE) injection 20 mL   Dose: 20 mL  Freq: Once in Imaging Route:  IV  Start: 01/01/19 0900 End: 01/01/19 0807    Admin Instructions:   Vesicant; admin as rapid bolus; flush with 5 mL NS after admin or 20 mL for renal or aortoiliofemoral vasculature       HYDROmorphone (DILAUDID) injection 0.5 mg   Dose: 0.5 mg  Freq: Once Route: IV  Start: 12/30/18 2150 End: 12/30/18 2217    Admin Instructions:   If given for pain, use the following pain scale:  Mild Pain = Pain Score of 1-3, CPOT 1-2  Moderate Pain = Pain Score of 4-6, CPOT 3-4  Severe Pain = Pain Score of 7-10, CPOT 5-8       HYDROmorphone (DILAUDID) injection 1 mg   Dose: 1 mg  Freq: Once Route: IV  Start: 12/30/18 2108 End: 12/30/18 2115    Admin Instructions:       Caution: Look alike/sound alike drug alert    If given for pain, use the following pain scale:  Mild Pain = Pain Score of 1-3, CPOT 1-2  Moderate Pain = Pain Score of 4-6, CPOT 3-4  Severe Pain = Pain Score of 7-10, CPOT 5-8       Influenza Vac Subunit Quad (FLUCELVAX) injection 0.5 mL   Dose: 0.5 mL  Freq: Once Route: IM  Start: 01/01/19 1200 End: 01/01/19 1211    Admin Instructions:   **Do Not Administer if Temperature Greater Than 102F & Notify Pharmacy** Pneumococcal & Influenza Vaccines May Be Given at the Same Time in SEPARATE Injections.       insulin lispro (humaLOG) injection 1-20 Units   Dose: 1-20 Units  Freq: 3 Times Daily With Meals Route: SC  Start: 12/31/18 0800    Admin Instructions:   Correction - Individual Dose.    Blood glucose 120-149 mg/dL - 1 unit  Blood glucose 150-199 mg/dL - 3 units  Blood glucose 200-249 mg/dL - 5 units  Blood glucose 250-299 mg/dL - 7 units  Blood glucose 300-349 mg/dL - 10 units  Blood glucose 350-399 mg/dL - 13 units  Blood glucose 400-449 mg/dL - 16 units  Blood glucose 450-500 mg/dL - 20 units  Blood glucose greater than 500 mg/dL, give 20 units and call provider     Caution: Look alike/sound alike drug alert    0841   [id number] [id number]                      insulin regular (humuLIN R,novoLIN R) injection 10 Units   Dose:  10 Units  Freq: Once Route: IV  Start: 12/30/18 1935 End: 12/30/18 1940    Admin Instructions:    Caution: Look alike/sound alike drug alert       iopamidol (ISOVUE-300) 61 % injection 100 mL   Dose: 100 mL  Freq: Once in Imaging Route: IV  Start: 12/30/18 2021 End: 12/30/18 2021       lidocaine (XYLOCAINE) 1 % injection 20 mL   Dose: 20 mL  Freq: Once Route: ID  Start: 12/31/18 1745 End: 12/31/18 1635       linagliptin (TRADJENTA) tablet 5 mg   Dose: 5 mg  Freq: Daily Route: PO  Start: 12/31/18 0900    0841                          metFORMIN (GLUCOPHAGE) tablet 1,000 mg   Dose: 1,000 mg  Freq: 2 Times Daily With Meals Route: PO  Start: 01/02/19 0800    Admin Instructions:   Do not give at time of or within 48 hours of iodinated intravenous contrast. Confirm renal function is normal before continuing.  Rescheduled per protocol for 1/2 AM given IV contrast admin 12/30 PM    (1003) [C]   1800                        metFORMIN (GLUCOPHAGE) tablet 1,000 mg   Dose: 1,000 mg  Freq: 2 Times Daily With Meals Route: PO  Start: 01/02/19 0800 End: 12/31/18 0905    Admin Instructions:   Do not give at time of or within 48 hours of iodinated intravenous contrast. Confirm renal function is normal before continuing.  Rescheduled per protocol for 1/2 AM given IV contrast admin 12/30 PM       metFORMIN (GLUCOPHAGE) tablet 1,000 mg   Dose: 1,000 mg  Freq: 2 Times Daily With Meals Route: PO  Start: 12/31/18 0030 End: 12/31/18 0904    Admin Instructions:   Do not give at time of or within 48 hours of iodinated intravenous contrast. Confirm renal function is normal before continuing.       nebivolol (BYSTOLIC) tablet 5 mg   Dose: 5 mg  Freq: Daily Route: PO  Start: 12/31/18 0900    0841                          nicotine (NICODERM CQ) 14 MG/24HR patch 1 patch   Dose: 1 patch  Freq: Every 24 Hours Scheduled Route: TD  Start: 12/31/18 0900    Admin Instructions:   Apply to clean, dry, nonhairy area of skin (typically upper arm or  shoulder)   Acutely Hazardous. Waste BOTH Residual Medication and/or Empty Package.    (1004)                          NIFEdipine XL (PROCARDIA XL) 24 hr tablet 90 mg   Dose: 90 mg  Freq: Daily Route: PO  Start: 12/31/18 0900    Admin Instructions:   Avoid grapefruit juice.    0841                          olmesartan (BENICAR) tablet 40 mg   Dose: 40 mg  Freq: Daily Route: PO  Start: 12/31/18 0900    Order specific questions:   Drug Name: olmesartan  Reason for Non-Formulary your other medications have been tried and failed    (1004)                          ondansetron (ZOFRAN) injection 4 mg   Dose: 4 mg  Freq: Once Route: IV  Start: 12/30/18 2108 End: 12/30/18 2114       pantoprazole (PROTONIX) EC tablet 40 mg   Dose: 40 mg  Freq: Every Morning Route: PO  Start: 12/31/18 0700    Admin Instructions:   Swallow whole; do not crush, split, or chew.    0640                          perflutren (DEFINITY) 8.476 mg in sodium chloride 0.9 % 10 mL injection   Dose: 3 mL  Freq: Once Route: IV  Start: 01/01/19 1015 End: 01/01/19 0930    Admin Instructions:   Mix 1.3 mL of mechanically activated Definity with 8.7 mL of normal saline. A total of 3 mL of the resulting Definity solution was administered.       pioglitazone (ACTOS) tablet 30 mg   Dose: 30 mg  Freq: Daily Route: PO  Start: 12/31/18 0900    0841                          piperacillin-tazobactam (ZOSYN) 4.5 g in iso-osmotic dextrose 100 mL IVPB (premix)   Dose: 4.5 g  Freq: Every 8 Hours Route: IV  Indications of Use: SKIN AND SOFT TISSUE INFECTION  Start: 01/01/19 2300 End: 01/01/19 1717    Admin Instructions:   Refrigerate       piperacillin-tazobactam (ZOSYN) 4.5 g in iso-osmotic dextrose 100 mL IVPB (premix)   Dose: 4.5 g  Freq: Once Route: IV  Start: 01/01/19 1745 End: 01/01/19 1717    Admin Instructions:   Refrigerate       piperacillin-tazobactam (ZOSYN) 4.5 g in iso-osmotic dextrose 100 mL IVPB (premix)   Dose: 4.5 g  Freq: Once Route: IV  Last Dose: Stopped  (12/30/18 2055)  Start: 12/30/18 1935 End: 12/30/18 2055    Admin Instructions:   Refrigerate       sodium chloride (HYPERTONIC) 3 % infusion   Dose: 60 mL/hr  Freq: Once Route: IV  Start: 12/31/18 2000    Admin Instructions:   Infuse 60ml/hour x 500 ml only       sodium chloride (HYPERTONIC) 3 % infusion   Dose: 60 mL/hr  Freq: Once Route: IV  Start: 12/30/18 2322 End: 12/31/18 0433    Admin Instructions:   Infuse 60ml/hour x 500 ml only       sodium chloride 0.9 % bolus 1,000 mL   Dose: 1,000 mL  Freq: Once Route: IV  Last Dose: Stopped (12/30/18 2057)  Start: 12/30/18 1830 End: 12/30/18 2057       sodium chloride 0.9 % flush 10 mL   Dose: 10 mL  Freq: Every 12 Hours Scheduled Route: IV  Start: 12/31/18 0744    Admin Instructions:   Lumen #2    0928   2100                        sodium chloride 0.9 % flush 10 mL   Dose: 10 mL  Freq: Every 12 Hours Scheduled Route: IV  Start: 12/31/18 0744    Admin Instructions:   Lumen #1    0928   2100                        sodium chloride 0.9 % flush 3 mL   Dose: 3 mL  Freq: Every 12 Hours Scheduled Route: IV  Start: 12/31/18 2100 0928   2100                        sodium chloride 0.9 % flush 3 mL   Dose: 3 mL  Freq: Every 12 Hours Scheduled Route: IV  Start: 12/30/18 2227 0928   2100                        sodium chloride 0.9% - IBW for BMI > 30 bolus 2,259 mL   Dose: 30 mL/kg  Weight Dosing Info: 75.3 kg (Ideal)  Freq: Once Route: IV  Last Dose: 2,259 mL (12/30/18 2105)  Start: 12/30/18 2050 End: 12/30/18 2205    Admin Instructions:   You may need to adjust the volume of this order to account for fluids already given. The total of the bolus(es) given should be as close to 30 mL/kg without going under.       valsartan (DIOVAN) tablet 320 mg   Dose: 320 mg  Freq: Every 24 Hours Scheduled Route: PO  Start: 12/31/18 0900 End: 12/30/18 2240       vancomycin 1500 mg/500 mL 0.9% NS IVPB ([facility])   Dose: 10 mg/kg  Weight Dosing Info: 139 kg  Freq: Every 8 Hours Route:  IV  Indications of Use: SKIN AND SOFT TISSUE INFECTION  Start: 12/31/18 0500 End: 12/31/18 2209       vancomycin 2000 mg/500 mL 0.9% NS IVPB ([facility])   Dose: 2,000 mg  Freq: Every 8 Hours Route: IV  Indications of Use: SKIN AND SOFT TISSUE INFECTION  Start: 01/01/19 0200 End: 01/01/19 1717       vancomycin 2750 mg/500 mL 0.9% NS IVPB ([facility])   Dose: 20 mg/kg  Weight Dosing Info: 139 kg  Freq: Once Route: IV  Indications of Use: SKIN AND SOFT TISSUE INFECTION  Start: 12/30/18 1935 End: 12/30/18 2101       Medications 01/02/19       Continuous Meds Sorted by Name   for Graham Meléndez as of 1/2/19 through 1/2/19    Legend:                          Inactive     Active     Other Encounter    Linked               Medications 01/02/19   Pharmacy to dose vancomycin   Freq: Continuous PRN Route: XX  PRN Reason: Consult  Indications of Use: SKIN AND SOFT TISSUE INFECTION  Start: 12/30/18 2227 End: 01/01/19 2310           PRN Meds Sorted by Name   for Graham Meléndez as of 1/2/19 through 1/2/19    Legend:                          Inactive     Active     Other Encounter    Linked               Medications 01/02/19   acetaminophen (TYLENOL) tablet 325 mg   Dose: 325 mg  Freq: Every 4 Hours PRN Route: PO  PRN Reason: Mild Pain   Start: 12/30/18 2207    Admin Instructions:   Do not exceed 4 grams of acetaminophen in a 24 hr period.    If given for pain, use the following pain scale:   Mild Pain = Pain Score of 1-3, CPOT 1-2  Moderate Pain = Pain Score of 4-6, CPOT 3-4  Severe Pain = Pain Score of 7-10, CPOT 5-8       acetaminophen (TYLENOL) tablet 650 mg   Dose: 650 mg  Freq: Every 4 Hours PRN Route: PO  PRN Reason: Mild Pain   Start: 12/30/18 2210    Admin Instructions:   Do not exceed 4 grams of acetaminophen in a 24 hr period.    If given for pain, use the following pain scale:   Mild Pain = Pain Score of 1-3, CPOT 1-2  Moderate Pain = Pain Score of 4-6, CPOT 3-4  Severe Pain = Pain Score of 7-10, CPOT 5-8    1290                           HYDROcodone-acetaminophen (NORCO) 7.5-325 MG per tablet 1 tablet   Dose: 1 tablet  Freq: 4 Times Daily PRN Route: PO  PRN Reason: Moderate Pain   Start: 01/01/19 1405 End: 01/11/19 1404    Admin Instructions:       Do not exceed 4 grams of acetaminophen in a 24 hr period.    If given for pain, use the following pain scale:   Mild Pain = Pain Score of 1-3, CPOT 1-2  Moderate Pain = Pain Score of 4-6, CPOT 3-4  Severe Pain = Pain Score of 7-10, CPOT 5-8    0235   1133   1530                      HYDROmorphone (DILAUDID) injection 1 mg   Dose: 1 mg  Freq: Every 4 Hours PRN Route: IV  PRN Reason: Severe Pain   Start: 12/30/18 2357 End: 01/02/19 0904    Admin Instructions:       Caution: Look alike/sound alike drug alert    If given for pain, use the following pain scale:  Mild Pain = Pain Score of 1-3, CPOT 1-2  Moderate Pain = Pain Score of 4-6, CPOT 3-4  Severe Pain = Pain Score of 7-10, CPOT 5-8    0415   0842   0904-D/C'd                    magnesium sulfate 2g/50 mL (PREMIX) infusion   Dose: 2 g  Freq: As Needed Route: IV  PRN Comment: to be given for Magnesium levels 1.8 or less PRN.  Start: 12/30/18 2301 End: 01/06/19 2300       Pharmacy to dose vancomycin   Freq: Continuous PRN Route: XX  PRN Reason: Consult  Indications of Use: SKIN AND SOFT TISSUE INFECTION  Start: 12/30/18 2227 End: 01/01/19 2310       potassium chloride (MICRO-K) CR capsule 40 mEq   Dose: 40 mEq  Freq: As Needed Route: PO  PRN Comment: potassium replacement.  see admin instructions  Start: 12/30/18 2224    Admin Instructions:   Potassium replacement    Oral (may give capsule or powder packet)  If K+ less than or equal to 3.1 give KCl 40 mEq q4h x 3 doses  If K+ 3.2-3.6 give KCl 40 mEq q4h x 2 doses    Peripheral IV  If K+ less than or equal to 3.1 give KCl 10 mEq/100 mL NS IV q1h x 6 doses  If K+ 3.2-3.6 give KCl 10 mEq/100 mL NS q1h x 4 doses    Central Line  If K+ less than or equal to 3.1 give KCl 20 mEq/50 mL NS IV q1h x 3 doses  If  K+ 3.2-3.6 give KCl 20 mEq/50 mL NS q1h x 2 doses    Check potassium 4 hours after last dose given.  Check magnesium if K stays low after replacement.  DO NOT GIVE if CrCl is less than 30 mL/minute or urine output is less than 30 mL/hr       Or  potassium chloride (KLOR-CON) packet 40 mEq   Dose: 40 mEq  Freq: As Needed Route: PO  PRN Comment: potassium replacement, see admin instructions  Start: 12/30/18 2224    Admin Instructions:   Potassium replacement    Oral (may give capsule or powder packet)  If K+ less than or equal to 3.1 give KCl 40 mEq q4h x 3 doses  If K+ 3.2-3.6 give KCl 40 mEq q4h x 2 doses    Peripheral IV  If K+ less than or equal to 3.1 give KCl 10 mEq/100 mL NS IV q1h x 6 doses  If K+ 3.2-3.6 give KCl 10 mEq/100 mL NS q1h x 4 doses    Central Line  If K+ less than or equal to 3.1 give KCl 20 mEq/50 mL NS IV q1h x 3 doses  If K+ 3.2-3.6 give KCl 20 mEq/50 mL NS q1h x 2 doses    Check potassium 4 hours after last dose given.  Check magnesium if K stays low after replacement.  DO NOT GIVE if CrCl is less than 30 mL/minute or urine output is less than 30 mL/hr       Or  potassium chloride 10 mEq in 100 mL IVPB   Dose: 10 mEq  Freq: Every 1 Hour PRN Route: IV  PRN Comment: potassium protocol PERIPHERAL - see admin instructions  Start: 12/30/18 2224    Admin Instructions:   Potassium replacement - patient NPO or cannot tolerate oral potassium    Peripheral or Central IV  If K+ less than or equal to 3.1 give KCl 10 mEq/100 mL NS IV q1h x 6 doses  If K+ 3.2-3.6 give KCl 10 mEq/100 mL NS q1h x 4 doses    Check potassium 4 hours after last dose given.  Check magnesium if K stays low after replacement.  DO NOT GIVE if CrCl is less than 30 mL/minute or urine output is less than 30 mL/hr. Rates greater than 10mEq/hr require ECG monitoring.       sodium chloride 0.9 % flush 1-10 mL   Dose: 1-10 mL  Freq: As Needed Route: IV  PRN Reason: Line Care  Start: 12/31/18 1924       sodium chloride 0.9 % flush 10 mL    Dose: 10 mL  Freq: As Needed Route: IV  PRN Reason: Line Care  PRN Comment: After Medication Administration  Start: 12/31/18 0744       sodium chloride 0.9 % flush 20 mL   Dose: 20 mL  Freq: As Needed Route: IV  PRN Reason: Line Care  PRN Comment: After Blood Draws or Blood Product Administration  Start: 12/31/18 0744       sodium chloride 0.9 % flush 3-10 mL   Dose: 3-10 mL  Freq: As Needed Route: IV  PRN Reason: Line Care  Start: 12/30/18 2210       zolpidem (AMBIEN) tablet 5 mg   Dose: 5 mg  Freq: Nightly PRN Route: PO  PRN Reason: Sleep  Start: 12/30/18 2355 End: 01/09/19 3584    Admin Instructions:          Medications 01/02/19

## 2019-01-02 NOTE — PROGRESS NOTES
Graham Meléndez  48 y.o.  1970  [id number]  [id number]  01/01/19     LOS: 2 days   Patient Care Team:  Reyes, Rosenberg Acosta, MD as PCP - General (Family Medicine)  Reyes, Rosenberg Acosta, MD (Family Medicine)    Chief Complaint   Patient presents with   • Hyperglycemia     pt c/o syncope and right sided facial swelling     Subjective   He states the pain on his left side of the face is bad and was wondering if he can get PO pain meds.  The left side of his face is still red and swollen.  We try Norco 7.5/325 mg 1 tab PO QID PRN for now.    Objective     LABORATORY DATA:  Lab Results (last 24 hours)     Procedure Component Value Units Date/Time    POC Glucose Once [[id number]]  (Abnormal) Collected:  01/01/19 2050    Specimen:  Blood Updated:  01/01/19 2052     Glucose 397 mg/dL     Blood Culture - Blood, Arm, Left [[id number]] Collected:  12/30/18 1928    Specimen:  Blood from Arm, Left Updated:  01/01/19 1930     Blood Culture No growth at 2 days    Blood Culture - Blood, Arm, Right [[id number]] Collected:  12/30/18 1837    Specimen:  Blood from Arm, Right Updated:  01/01/19 1900     Blood Culture No growth at 2 days    POC Glucose Once [[id number]]  (Abnormal) Collected:  01/01/19 1651    Specimen:  Blood Updated:  01/01/19 1654     Glucose 407 mg/dL     POC Glucose Once [[id number]]  (Abnormal) Collected:  01/01/19 1151    Specimen:  Blood Updated:  01/01/19 1153     Glucose 333 mg/dL     Body Fluid Culture - Aspirate, Face [[id number]]  (Abnormal) Collected:  12/31/18 1635    Specimen:  Aspirate from Face Updated:  01/01/19 1041     BF Culture Moderate growth (3+) Streptococcus agalactiae (Group B)     Comment: This organism is considered to be universally susceptible to penicillin.  No further antibiotic testing will be performed.        Gram Stain Few (2+) Gram positive cocci      Rare (1+) WBCs per low power field    Comprehensive Metabolic Panel [[id number]]  (Abnormal) Collected:  01/01/19 0632    Specimen:   Blood Updated:  01/01/19 0744     Glucose 427 mg/dL      BUN 11 mg/dL      Creatinine 0.72 mg/dL      Sodium 131 mmol/L      Potassium 3.9 mmol/L      Chloride 96 mmol/L      CO2 22.9 mmol/L      Calcium 8.4 mg/dL      Total Protein 6.4 g/dL      Albumin 3.10 g/dL      ALT (SGPT) 17 U/L      AST (SGOT) 16 U/L      Alkaline Phosphatase 94 U/L      Total Bilirubin 0.2 mg/dL      eGFR Non African Amer 118 mL/min/1.73      Globulin 3.3 gm/dL      A/G Ratio 0.9 g/dL      BUN/Creatinine Ratio 15.3     Anion Gap 12.1 mmol/L     Magnesium [[id number]]  (Normal) Collected:  01/01/19 0632    Specimen:  Blood Updated:  01/01/19 0728     Magnesium 2.3 mg/dL     CBC & Differential [[id number]] Collected:  01/01/19 0632    Specimen:  Blood Updated:  01/01/19 0718    Narrative:       The following orders were created for panel order CBC & Differential.  Procedure                               Abnormality         Status                     ---------                               -----------         ------                     CBC Auto Differential[[id number]]        Abnormal            Final result                 Please view results for these tests on the individual orders.    CBC Auto Differential [[id number]]  (Abnormal) Collected:  01/01/19 0632    Specimen:  Blood Updated:  01/01/19 0718     WBC 13.04 10*3/mm3      RBC 4.90 10*6/mm3      Hemoglobin 14.9 g/dL      Hematocrit 44.4 %      MCV 90.6 fL      MCH 30.4 pg      MCHC 33.6 g/dL      RDW 12.8 %      RDW-SD 42.1 fl      MPV 11.5 fL      Platelets 205 10*3/mm3      Neutrophil % 51.4 %      Lymphocyte % 38.6 %      Monocyte % 8.1 %      Eosinophil % 1.5 %      Basophil % 0.2 %      Immature Grans % 0.2 %      Neutrophils, Absolute 6.69 10*3/mm3      Lymphocytes, Absolute 5.03 10*3/mm3      Monocytes, Absolute 1.06 10*3/mm3      Eosinophils, Absolute 0.20 10*3/mm3      Basophils, Absolute 0.03 10*3/mm3      Immature Grans, Absolute 0.03 10*3/mm3     Thyroid Peroxidase Antibody  [[id number]] Collected:  12/31/18 0208    Specimen:  Blood Updated:  01/01/19 0710     Thyroid Peroxidase Antibody 9 IU/mL     Narrative:       Performed at:  [address] [place] [address]  [id number]  : Agustin Centeno PhD, Phone:  [phone]    POC Glucose Once [[id number]]  (Abnormal) Collected:  01/01/19 0612    Specimen:  Blood Updated:  01/01/19 0614     Glucose 429 mg/dL       RADIOGRAPHIC DATA:    MRI BRAIN W WO CONTRAST:    INDICATIONS: Disequilibrium    TECHNIQUE:  Multiplanar multisequence magnetic resonance imaging of the brain, without and with intravenous contrast material.    COMPARISON:  1/26/2017.  Correlated with facial CT from 12/30/2018    FINDINGS:  The diffusion-weighted images show no restricted diffusion to suggest acute infarct.    The midline structures appear unremarkable.    The brain parenchyma shows normal signal intensity.  No enhancing lesions of brain.  Vascular flow related artifact is seen on the postcontrast images    Flow voids in the major arteries at the base of the brain appear unremarkable.    Small likely mucous retention cyst or polyp in the right maxillary sinus.  The paranasal sinuses, mastoid air cells, and orbits appear otherwise unremarkable.    At the previously CT-demonstrated abscess of the left upper lip, marginally enhancing fluid collection with surrounding edema is also noted on this exam, without obvious change.    IMPRESSION:  1. No acute infarct.  No enhancing lesions of brain.  No conspicuous white matter disease.  Follow-up as indications persist  2. Redemonstration of abscess of the left upper lip    This report was finalized on 1/1/2019 8:49 AM by Dr. Hima Ivory M.D.    ADULT TRANSTHORACIC ECHOCARDIOGRAM:    Interpretation Summary     · Left ventricular systolic function is normal. Calculated EF = 70.0%. Estimated EF was in agreement with the calculated EF. Normal left ventricular cavity size noted. All left  ventricular wall segments contract normally.  · Left ventricular wall thickness is consistent with mild concentric hypertrophy.  · Left ventricular diastolic function is normal.  · No valvular stenosis or insufficiency.     Study Description     A two-dimensional transthoracic echocardiogram with complete color flow and Doppler was performed. The study is technically good for diagnosis.Verbal consent was obtained from the patient to use Definity contrast in order to optimize the study. Note also that contrast was used to enhance ejection fraction reproducability of the LV ejection fraction on serial echocardiograms.  The use of Definity was indicated as two or more contiguous segments of the left ventricular endocardial border were unable to be adequately visualized by standard imaging methods. 1.3 mL of mechanically activated Definity was mixed with 8.7 mL of normal saline. A total of 3 mL of the resulting Definity solution was administered, and the remaining contrast was wasted and discarded.   No adverse reaction to contrast was noted. Lot [id number] exp [id number].     Echocardiogram Findings     Left Ventricle Left ventricular systolic function is normal. Calculated EF = 70.0%. Estimated EF was in agreement with the calculated EF. Normal left ventricular cavity size noted. All left ventricular wall segments contract normally. Left ventricular wall thickness is consistent with mild concentric hypertrophy. Left ventricular diastolic function is normal.   Right Ventricle Normal right ventricular cavity size, wall thickness, systolic function and septal motion noted.   Left Atrium Normal left atrial size and volume noted.   Right Atrium Normal right atrial size noted.   Aortic Valve The aortic valve is structurally normal. No aortic valve regurgitation is present. No aortic valve stenosis is present.   Mitral Valve The mitral valve is normal in structure. No mitral valve regurgitation is present. No significant mitral valve  stenosis is present.   Tricuspid Valve The tricuspid valve is normal. No tricuspid valve stenosis is present. No tricuspid valve regurgitation is present.   Pulmonic Valve The pulmonic valve is structurally normal. There is no significant pulmonic valve stenosis present. There is no pulmonic valve regurgitation present.   Greater Vessels No dilation of the aortic root is present. No dilation of the sinuses of Valsalva is present. No dilation of the proximal aorta is present.   Pericardium The pericardium is normal. There is no evidence of pericardial effusion.     Wall Scoring     Score Index: 1.00            The left ventricular wall motion is normal.                 DUPLEX CAROTID BILATERAL BLOOD FLOW STUDY:    Interpretation Summary     · Proximal right internal carotid artery is normal.  · Proximal left internal carotid artery is normal.     Study Findings     • Right CCA Prox: No plaque visualized.   • Right CCA Dist: No plaque visualized.   • Right ICA Prox: No plaque visualized.   • Right ICA Dist: Tortuous vessel.   • Right ECA: No plaque visualized.   • Right Vertebral: Antegrade flow noted.     • Left CCA Prox: No plaque visualized.   • Left CCA Dist: No plaque visualized.   • Left ICA Prox: No plaque visualized.   • Left ECA: No plaque visualized.   • Left Vertebral: Antegrade flow noted.     Vital Signs  Temp:  [97.8 °F (36.6 °C)-99 °F (37.2 °C)] 98.5 °F (36.9 °C)  Heart Rate:  [77-83] 77  Resp:  [16-18] 18  BP: (110-135)/(69-77) 114/69    Physical Exam  PHYSICAL EXAM:    VITAL SIGNS:  T Max  98.5  CA  77  RR  18  B/P  114/69  BMI  42.9.    GENERAL:  Fairly nourish.  Fairly developed.  Morbidly obese.  Fair Affect.    SKIN:  Not diaphoretic.  Fair skin turgor.  Not jaundice.  Swollen, red, and warm of left maxillary and left upper lip.    HEENMT:  Normocephalic/Atraumatic.  Pinkish palpebral conjunctiva.  Anicteric sclerae.  PERRLA.  EOMI.  Oral mucosal membrane are moist.  Pharynx is not red.    NECK:   Supple.  No JVD.  No bruits.    THORAX AND LUNGS:  Clear to Auscultation.  No rhonchi, wheeze, nor rales.  No tenderness upon deep palpation of the costochondral junction.    CARDIOVASCULAR SYSTEM:  Regular rate and rhythm, no murmur.  Normal S1 / S2.  No S3 / S4.  No heaves.    ABDOMEN:  No Hepatosplenomegaly.  Soft.  Not tender.  Not distended.  Positive  bowel sounds.    EXTREMITIES:  No cyanosis, clubbing, nor edema.  No calf tenderness.    CNS:  Alert.  Oriented x 3.  Cranial nerves are intact.  Sensory / Motor are intact.     Results Review:     I reviewed the patient's new clinical results.  Discussed with patient and sister.    Assessment/Plan       0)  Cellulitis of Left Upper Lip with Abscess Formation due to Streptococcus agalactiae (Group B) with Sepsis.    1)  Azotemia.    2)  Glucosuria.    3)  Dehydration.    4)  Hyperkalemia.    5)  Hyponatremia.    6)  Hyperglycemia.    7)  Disequilibrium.    8)  Hypothyroidism.    9)  Syncopal Episode.    10)  Labile Hypertension.    11)  Chest Pain with Tightness.    12)  Uncontrolled Diabetes Mellitus Type 2.    13)  History of Sleep Apnea.    14)  History of Hypertension.    15)  History of Hyperlipidemia.    16)  History of Tobacco Abuse.    17)  History of Myofascial Pain.    18)  History of Allergic Rhinitis.    19)  History of Vitamin D Deficiency.    20)  History of Hypertriglyceridemia.    21)  History of Anxiety and Depression.    22)  History of Right Temporal Carbuncle.    23)  History of Gastroesophageal Reflux Disease.    24)  History of Low Back Pain with Radiculopathy.    25)  History of Uncontrolled Diabetes Mellitus Type 2.    PLAN:  Continue Present Management.      Cellulitis of left upper lip with abscess formation with sepsis    Azotemia    Glucosuria    Dehydration    Hyperkalemia    Hyponatremia    Hyperglycemia    Labile hypertension    Uncontrolled type 2 diabetes mellitus with hyperglycemia (CMS/HCC)    Rosenberg Acosta Reyes,  MD  01/01/19  11:58 PM

## 2019-01-02 NOTE — PROGRESS NOTES
Patient is being treated for a strep infection with infectious disease instituting Unasyn IV therapy with plans for transitioning to by mouth Augmentin 875 mg by mouth every 12 hours.  The patient has been quite agitated today and feels that surgical intervention is the only way out of his current status even though he is getting better with the IV antibiotics alone.    Examination: Patient is alert and oriented but is agitated.  Is afebrile with stable vital signs.  His left cheek and temple region has decreased in its swelling and there is maximal swelling in the mouth is subsided considerably over the past 2 days.  He does not have discharge at this juncture.  He has a small 5 mm x 9 mm area of devitalized tissue on the oral mucosa.    Laboratory: His WBC has decreased from 19.5-11.5 in 2 days' time.  His blood glucose level is still in the high 300s for the most part and this is being worked on by Dr Reyes.    Plan: I reiterated to him and his sister that I do not think he will require surgical intervention as she has improved markedly over the past 2 days.  I have recommended that he continue the warm soaks 5 minutes when necessary and follow up with me in the office in 7-10 days' time after discharge.  I have discussed with the patient, his sister, and Dr. Reyes to my plan for him at this juncture, plastic surgical standpoint.    DANIEL

## 2019-01-02 NOTE — NURSING NOTE
REGARDING PICC LINE CONSULT  Chart reviewed.  Per ID note patient maybe d/c home with oral antibiotics and PICC line may not be indicated.  IV Team will continue to hold plan for placement until determination of need for outpatient Abx.  Patient has adequate PIV access at present.

## 2019-01-02 NOTE — PROGRESS NOTES
Patient was seen yesterday in consultation with expected MRSA as 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  I ordered a CT-guided aspiration of a small fluid collection which returned 0.5 cc but was enough to get a culture showing 3+ Streptococcus agalactiae (Group B) sensitive to penicillin.  Dr Carter from infectious disease discontinued the patient's vancomycin and Zosyn and in its place started the patient on Unasyn with plans for discharge and a day or 2 on oral Augmentin.  The patient reports better pain control.     Examination today shows much less erythema and swelling of the left temple cheek and left upper lip but the left upper lip is the most swollen of all sites.  This is not tense swelling is no discharge at the present time.    White count has decreased from a emergency room value of 19 on 12/31/20 value today of 13.4    Plan: Continue Unasyn IV until discontinued by Dr. Carter.            Continue warm compresses 5 minutes every hour and when necessary.             I do not see any current surgical indications but will see him tomorrow to assess his progress and possible discharge            Planning.    DANIEL

## 2019-01-02 NOTE — PLAN OF CARE
Problem: Patient Care Overview  Goal: Plan of Care Review  Outcome: Ongoing (interventions implemented as appropriate)   01/01/19 2028   Coping/Psychosocial   Plan of Care Reviewed With patient   Plan of Care Review   Progress no change   OTHER   Outcome Summary Pt states pain is worse today and requiring prn oral pain meds in addition to IV. Left face swelling. IV antibiotics.       Problem: Pain, Acute (Adult)  Goal: Acceptable Pain Control/Comfort Level  Outcome: Ongoing (interventions implemented as appropriate)      Problem: Fall Risk (Adult)  Goal: Absence of Fall  Outcome: Ongoing (interventions implemented as appropriate)      Problem: Skin Injury Risk (Adult)  Goal: Skin Health and Integrity  Outcome: Ongoing (interventions implemented as appropriate)      Problem: Infection, Risk/Actual (Adult)  Goal: Infection Prevention/Resolution  Outcome: Ongoing (interventions implemented as appropriate)      Problem: Diabetes, Type 2 (Adult)  Goal: Signs and Symptoms of Listed Potential Problems Will be Absent, Minimized or Managed (Diabetes, Type 2)  Outcome: Ongoing (interventions implemented as appropriate)   01/01/19 2028   Goal/Outcome Evaluation   Problems Assessed (Type 2 Diabetes) hyperglycemia   Problems Present (Type 2 Diabetes) hyperglycemia;situational response

## 2019-01-02 NOTE — PROGRESS NOTES
LOS: 3 days   Patient Care Team:  Reyes, Rosenberg Acosta, MD as PCP - General (Family Medicine)  Reyes, Rosenberg Acosta, MD (Family Medicine)        Subjective     Interval History:     Patient Complaints:   Patient Denies:  NVD       Review of Systems:    facila pain    Objective     Vital Signs  Temp:  [98.1 °F (36.7 °C)-99 °F (37.2 °C)] 98.1 °F (36.7 °C)  Heart Rate:  [77-83] 78  Resp:  [16-18] 18  BP: (106-135)/(67-76) 106/67    Physical Exam:     General Appearance:    Alert, cooperative, in no acute distress   Head:    Normocephalic, without obvious abnormality, atraumatic   Eyes:            Lids and lashes normal, conjunctivae and sclerae normal, no   icterus, no pallor, corneas clear, PERRLA   Ears:    Ears appear intact with no abnormalities noted   Throat:   No oral lesions, no thrush, oral mucosa moist  L face better   Neck:   No adenopathy, supple, trachea midline, no thyromegaly, no   carotid bruit, no JVD   Back:     No kyphosis present, no scoliosis present, no skin lesions,      erythema or scars, no tenderness to percussion or                   palpation,   range of motion normal   Lungs:     Clear to auscultation,respirations regular, even and                  unlabored    Heart:    Regular rhythm and normal rate, normal S1 and S2, no            murmur, no gallop, no rub, no click   Chest Wall:    No abnormalities observed   Abdomen:     Normal bowel sounds, no masses, no organomegaly, soft        non-tender, non-distended, no guarding, no rebound                tenderness   Rectal:     Deferred   Extremities:   Moves all extremities well, no edema, no cyanosis, no             redness   Pulses:   Pulses palpable and equal bilaterally   Skin:   No bleeding, bruising or rash   Lymph nodes:   No palpable adenopathy   Neurologic:   Cranial nerves 2 - 12 grossly intact, sensation intact, DTR       present and equal bilaterally          Results Review:      Lab Results (last 72 hours)      Procedure Component Value Units Date/Time    POC Glucose Once [[id number]]  (Abnormal) Collected:  01/01/19 1651    Specimen:  Blood Updated:  01/01/19 1654     Glucose 407 mg/dL     POC Glucose Once [[id number]]  (Abnormal) Collected:  01/01/19 1151    Specimen:  Blood Updated:  01/01/19 1153     Glucose 333 mg/dL     Body Fluid Culture - Aspirate, Face [[id number]]  (Abnormal) Collected:  12/31/18 1635    Specimen:  Aspirate from Face Updated:  01/01/19 1041     BF Culture Moderate growth (3+) Streptococcus agalactiae (Group B)     Comment: This organism is considered to be universally susceptible to penicillin.  No further antibiotic testing will be performed.        Gram Stain Few (2+) Gram positive cocci      Rare (1+) WBCs per low power field    Comprehensive Metabolic Panel [[id number]]  (Abnormal) Collected:  01/01/19 0632    Specimen:  Blood Updated:  01/01/19 0744     Glucose 427 mg/dL      BUN 11 mg/dL      Creatinine 0.72 mg/dL      Sodium 131 mmol/L      Potassium 3.9 mmol/L      Chloride 96 mmol/L      CO2 22.9 mmol/L      Calcium 8.4 mg/dL      Total Protein 6.4 g/dL      Albumin 3.10 g/dL      ALT (SGPT) 17 U/L      AST (SGOT) 16 U/L      Alkaline Phosphatase 94 U/L      Total Bilirubin 0.2 mg/dL      eGFR Non African Amer 118 mL/min/1.73      Globulin 3.3 gm/dL      A/G Ratio 0.9 g/dL      BUN/Creatinine Ratio 15.3     Anion Gap 12.1 mmol/L     Magnesium [[id number]]  (Normal) Collected:  01/01/19 0632    Specimen:  Blood Updated:  01/01/19 0728     Magnesium 2.3 mg/dL     CBC & Differential [[id number]] Collected:  01/01/19 0632    Specimen:  Blood Updated:  01/01/19 0718    Narrative:       The following orders were created for panel order CBC & Differential.  Procedure                               Abnormality         Status                     ---------                               -----------         ------                     CBC Auto Differential[[id number]]        Abnormal            Final result                  Please view results for these tests on the individual orders.    CBC Auto Differential [[id number]]  (Abnormal) Collected:  01/01/19 0632    Specimen:  Blood Updated:  01/01/19 0718     WBC 13.04 10*3/mm3      RBC 4.90 10*6/mm3      Hemoglobin 14.9 g/dL      Hematocrit 44.4 %      MCV 90.6 fL      MCH 30.4 pg      MCHC 33.6 g/dL      RDW 12.8 %      RDW-SD 42.1 fl      MPV 11.5 fL      Platelets 205 10*3/mm3      Neutrophil % 51.4 %      Lymphocyte % 38.6 %      Monocyte % 8.1 %      Eosinophil % 1.5 %      Basophil % 0.2 %      Immature Grans % 0.2 %      Neutrophils, Absolute 6.69 10*3/mm3      Lymphocytes, Absolute 5.03 10*3/mm3      Monocytes, Absolute 1.06 10*3/mm3      Eosinophils, Absolute 0.20 10*3/mm3      Basophils, Absolute 0.03 10*3/mm3      Immature Grans, Absolute 0.03 10*3/mm3     Thyroid Peroxidase Antibody [[id number]] Collected:  12/31/18 0208    Specimen:  Blood Updated:  01/01/19 0710     Thyroid Peroxidase Antibody 9 IU/mL     Narrative:       Performed at:  [address] [place] [address]  [id number]  : Agustin Centeno PhD, Phone:  [phone]    POC Glucose Once [[id number]]  (Abnormal) Collected:  01/01/19 0612    Specimen:  Blood Updated:  01/01/19 0614     Glucose 429 mg/dL     Vancomycin, Trough Vancomycin dose due at 2100. Please make sure Vancomycin IV is not infusing before drawing the vancomycin level. Hold vancomycin dose if level is >21 mcg/mL. [[id number]]  (Normal) Collected:  12/31/18 2039    Specimen:  Blood Updated:  12/31/18 2127     Vancomycin Trough 9.30 mcg/mL     POC Glucose Once [[id number]]  (Abnormal) Collected:  12/31/18 2038    Specimen:  Blood Updated:  12/31/18 2039     Glucose 401 mg/dL     Blood Culture - Blood, Arm, Left [[id number]] Collected:  12/30/18 1928    Specimen:  Blood from Arm, Left Updated:  12/31/18 1930     Blood Culture No growth at 24 hours    Blood Culture - Blood, Arm, Right [[id number]] Collected:  12/30/18  1837    Specimen:  Blood from Arm, Right Updated:  12/31/18 1900     Blood Culture No growth at 24 hours    POC Glucose Once [[id number]]  (Abnormal) Collected:  12/31/18 1735    Specimen:  Blood Updated:  12/31/18 1738     Glucose 323 mg/dL     Troponin [[id number]]  (Normal) Collected:  12/31/18 1408    Specimen:  Blood Updated:  12/31/18 1451     Troponin T <0.010 ng/mL     Narrative:       Troponin T Reference Ranges:  Less than 0.03 ng/mL:    Negative for AMI  0.03 to 0.09 ng/mL:      Indeterminant for AMI  Greater than 0.09 ng/mL: Positive for AMI    POC Glucose Once [[id number]]  (Abnormal) Collected:  12/31/18 1131    Specimen:  Blood Updated:  12/31/18 1133     Glucose 310 mg/dL     Troponin [[id number]]  (Normal) Collected:  12/31/18 0604    Specimen:  Blood Updated:  12/31/18 0714     Troponin T <0.010 ng/mL     Narrative:       Troponin T Reference Ranges:  Less than 0.03 ng/mL:    Negative for AMI  0.03 to 0.09 ng/mL:      Indeterminant for AMI  Greater than 0.09 ng/mL: Positive for AMI    Blood Gas, Arterial [[id number]]  (Abnormal) Collected:  12/31/18 0649    Specimen:  Arterial Blood Updated:  12/31/18 0651     Site Arterial: right radial     Conrad's Test Positive     pH, Arterial 7.363 pH units      pCO2, Arterial 48.9 mm Hg      pO2, Arterial 82.8 mm Hg      HCO3, Arterial 27.8 mmol/L      Base Excess, Arterial 1.4 mmol/L      O2 Saturation Calculated 95.5 %      Barometric Pressure for Blood Gas 748.3 mmHg      Modality Cannula     Set [facility] Resp Rate 18    POC Glucose Once [[id number]]  (Abnormal) Collected:  12/31/18 0642    Specimen:  Blood Updated:  12/31/18 0644     Glucose 384 mg/dL     Lipid Panel [[id number]]  (Abnormal) Collected:  12/31/18 0208    Specimen:  Blood Updated:  12/31/18 0435     Total Cholesterol 195 mg/dL      Triglycerides 1,155 mg/dL      HDL Cholesterol 24 mg/dL      LDL Cholesterol  -- mg/dL      Comment: Unable to calculate        VLDL Cholesterol -- mg/dL      Comment:  Unable to calculate        LDL/HDL Ratio --     Comment: Unable to calculate       Narrative:       Cholesterol Reference Ranges  (U.S. Department of Health and Human Services ATP III Classifications)    Desirable          <200 mg/dL  Borderline High    200-239 mg/dL  High Risk          >240 mg/dL      Triglyceride Reference Ranges  (U.S. Department of Health and Human Services ATP III Classifications)    Normal           <150 mg/dL  Borderline High  150-199 mg/dL  High             200-499 mg/dL  Very High        >500 mg/dL    HDL Reference Ranges  (U.S. Department of Health and Human Services ATP III Classifcations)    Low     <40 mg/dl (major risk factor for CHD)  High    >60 mg/dl ('negative' risk factor for CHD)        LDL Reference Ranges  (U.S. Department of Health and Human Services ATP III Classifcations)    Optimal          <100 mg/dL  Near Optimal     100-129 mg/dL  Borderline High  130-159 mg/dL  High             160-189 mg/dL  Very High        >189 mg/dL    LDL Cholesterol, Direct [[id number]]  (Normal) Collected:  12/31/18 0208    Specimen:  Blood Updated:  12/31/18 0353     LDL Cholesterol  73 mg/dL     Narrative:       LDL Reference Ranges    (U.S. Department of Health and Human Services ATP III Classifications)    Optimal          <100 mg/dl  Near Optimal     100-129 mg/dl  Borderline High  130-159 mg/dl  High             160-189 mg/dl  Very High        >189 mg/dl    Bilirubin, Total & Direct [[id number]] Collected:  12/31/18 0208    Specimen:  Blood Updated:  12/31/18 0337     Total Bilirubin 0.3 mg/dL      Bilirubin, Direct <0.2 mg/dL      Comment: Specimen hemolyzed. Results may be affected.        Bilirubin, Indirect -- mg/dL      Comment: Unable to calculate       Rheumatoid Factor [[id number]]  (Normal) Collected:  12/31/18 0208    Specimen:  Blood Updated:  12/31/18 0337     Rheumatoid Factor Quantitative <10.0 IU/mL     Troponin [[id number]]  (Normal) Collected:  12/31/18 0208    Specimen:  Blood  Updated:  12/31/18 0333     Troponin T <0.010 ng/mL     Narrative:       Troponin T Reference Ranges:  Less than 0.03 ng/mL:    Negative for AMI  0.03 to 0.09 ng/mL:      Indeterminant for AMI  Greater than 0.09 ng/mL: Positive for AMI    T4, Free [[id number]]  (Normal) Collected:  12/31/18 0208    Specimen:  Blood Updated:  12/31/18 0333     Free T4 1.16 ng/dL     TSH [[id number]]  (Abnormal) Collected:  12/31/18 0208    Specimen:  Blood Updated:  12/31/18 0333     TSH 4.740 mIU/mL     Sedimentation Rate [[id number]]  (Normal) Collected:  12/31/18 0208    Specimen:  Blood Updated:  12/31/18 0323     Sed Rate 14 mm/hr     Comprehensive Metabolic Panel [[id number]]  (Abnormal) Collected:  12/31/18 0208    Specimen:  Blood Updated:  12/31/18 0323     Glucose 396 mg/dL      BUN 17 mg/dL      Creatinine 0.78 mg/dL      Sodium 130 mmol/L      Potassium 3.9 mmol/L      Chloride 91 mmol/L      CO2 23.8 mmol/L      Calcium 8.7 mg/dL      Total Protein 6.8 g/dL      Albumin 3.40 g/dL      ALT (SGPT) 17 U/L      AST (SGOT) 14 U/L      Comment: Specimen hemolyzed.  Results may be affected.        Alkaline Phosphatase 95 U/L      Total Bilirubin 0.2 mg/dL      eGFR Non African Amer 107 mL/min/1.73      Globulin 3.4 gm/dL      A/G Ratio 1.0 g/dL      BUN/Creatinine Ratio 21.8     Anion Gap 15.2 mmol/L     Uric Acid [[id number]]  (Normal) Collected:  12/31/18 0208    Specimen:  Blood Updated:  12/31/18 0323     Uric Acid 4.7 mg/dL     Magnesium [[id number]]  (Normal) Collected:  12/31/18 0208    Specimen:  Blood Updated:  12/31/18 0323     Magnesium 2.0 mg/dL     Gamma GT [[id number]]  (Normal) Collected:  12/31/18 0208    Specimen:  Blood Updated:  12/31/18 0322     GGT 58 U/L     Lipase [[id number]]  (Normal) Collected:  12/31/18 0208    Specimen:  Blood Updated:  12/31/18 0322     Lipase 27 U/L     C-reactive Protein [[id number]]  (Abnormal) Collected:  12/31/18 0208    Specimen:  Blood Updated:  12/31/18 0322     C-Reactive Protein  1.25 mg/dL     High Sensitivity CRP [[id number]]  (Abnormal) Collected:  12/31/18 0208    Specimen:  Blood Updated:  12/31/18 0322     C-Reactive Protein 0.967 mg/dL     Amylase [[id number]]  (Abnormal) Collected:  12/31/18 0208    Specimen:  Blood Updated:  12/31/18 0322     Amylase 22 U/L     CK [[id number]]  (Normal) Collected:  12/31/18 0208    Specimen:  Blood Updated:  12/31/18 0322     Creatine Kinase 43 U/L     BNP [[id number]]  (Normal) Collected:  12/31/18 0208    Specimen:  Blood Updated:  12/31/18 0321     proBNP 36.2 pg/mL     Narrative:       Among patients with dyspnea, NT-proBNP is highly sensitive for the detection of acute congestive heart failure. In addition NT-proBNP of <300 pg/ml effectively rules out acute congestive heart failure with 99% negative predictive value.    Hemoglobin A1c [[id number]]  (Abnormal) Collected:  12/31/18 0214    Specimen:  Blood Updated:  12/31/18 0317     Hemoglobin A1C 15.70 %     Narrative:       Hemoglobin A1C Ranges:    Increased Risk for Diabetes  5.7% to 6.4%  Diabetes                     >= 6.5%  Diabetic Goal                < 7.0%    Lactic Acid, Reflex [[id number]]  (Normal) Collected:  12/31/18 0208    Specimen:  Blood Updated:  12/31/18 0316     Lactate 1.3 mmol/L     CBC & Differential [[id number]] Collected:  12/31/18 0208    Specimen:  Blood Updated:  12/31/18 0300    Narrative:       The following orders were created for panel order CBC & Differential.  Procedure                               Abnormality         Status                     ---------                               -----------         ------                     CBC Auto Differential[[id number]]        Abnormal            Final result                 Please view results for these tests on the individual orders.    CBC Auto Differential [[id number]]  (Abnormal) Collected:  12/31/18 0208    Specimen:  Blood Updated:  12/31/18 0300     WBC 19.50 10*3/mm3      RBC 4.97 10*6/mm3      Hemoglobin 15.1 g/dL       Hematocrit 44.0 %      MCV 88.5 fL      MCH 30.4 pg      MCHC 34.3 g/dL      RDW 12.8 %      RDW-SD 40.6 fl      MPV 11.2 fL      Platelets 220 10*3/mm3      Neutrophil % 58.1 %      Lymphocyte % 32.2 %      Monocyte % 8.4 %      Eosinophil % 0.7 %      Basophil % 0.2 %      Immature Grans % 0.4 %      Neutrophils, Absolute 11.36 10*3/mm3      Lymphocytes, Absolute 6.27 10*3/mm3      Monocytes, Absolute 1.63 10*3/mm3      Eosinophils, Absolute 0.14 10*3/mm3      Basophils, Absolute 0.03 10*3/mm3      Immature Grans, Absolute 0.07 10*3/mm3     Insulin, Random [[id number]] Collected:  12/31/18 0208    Specimen:  Blood Updated:  12/31/18 0252    AUSTEN [[id number]] Collected:  12/31/18 0208    Specimen:  Blood Updated:  12/31/18 0251    C-Peptide [[id number]] Collected:  12/31/18 0208    Specimen:  Blood Updated:  12/31/18 0251    Thyroglobulin [[id number]] Collected:  12/31/18 0208    Specimen:  Blood Updated:  12/31/18 0251    Lactic Acid, Reflex Timer (This will reflex a repeat order 3-3:15 hours after ordered.) [[id number]] Collected:  12/30/18 1957    Specimen:  Blood Updated:  12/30/18 2330     Extra Tube Hold for add-ons.     Comment: Auto resulted.       POC Glucose Once [[id number]]  (Abnormal) Collected:  12/30/18 2105    Specimen:  Blood Updated:  12/30/18 2107     Glucose 483 mg/dL     Lactic Acid, Plasma [[id number]]  (Abnormal) Collected:  12/30/18 1957    Specimen:  Blood Updated:  12/30/18 2020     Lactate 4.2 mmol/L     Phosphorus [[id number]]  (Abnormal) Collected:  12/30/18 1821    Specimen:  Blood Updated:  12/30/18 1953     Phosphorus 4.6 mg/dL     Magnesium [[id number]]  (Normal) Collected:  12/30/18 1821    Specimen:  Blood Updated:  12/30/18 1953     Magnesium 2.0 mg/dL     [place] Draw [[id number]] Collected:  12/30/18 1821    Specimen:  Blood Updated:  12/30/18 1930    Narrative:       The following orders were created for panel order [place] Draw.  Procedure                               Abnormality          Status                     ---------                               -----------         ------                     Light Blue Top[[id number]]                                   Final result               Green Top (Gel)[[id number]]                                  Final result               Lavender Top[[id number]]                                     Final result               Gold Top - SST[[id number]]                                   Final result                 Please view results for these tests on the individual orders.    Green Top (Gel) [[id number]] Collected:  12/30/18 1821    Specimen:  Blood Updated:  12/30/18 1930     Extra Tube Hold for add-ons.     Comment: Auto resulted.       Lavender Top [[id number]] Collected:  12/30/18 1821    Specimen:  Blood Updated:  12/30/18 1930     Extra Tube hold for add-on     Comment: Auto resulted       Light Blue Top [[id number]] Collected:  12/30/18 1821    Specimen:  Blood Updated:  12/30/18 1930     Extra Tube hold for add-on     Comment: Auto resulted       Gold Top - SST [[id number]] Collected:  12/30/18 1821    Specimen:  Blood Updated:  12/30/18 1930     Extra Tube Hold for add-ons.     Comment: Auto resulted.       Comprehensive Metabolic Panel [[id number]]  (Abnormal) Collected:  12/30/18 1821    Specimen:  Blood Updated:  12/30/18 1918     Glucose 747 mg/dL      BUN 25 mg/dL      Creatinine 1.28 mg/dL      Sodium 124 mmol/L      Potassium 5.3 mmol/L      Chloride 83 mmol/L      CO2 22.4 mmol/L      Calcium 10.1 mg/dL      Total Protein 7.8 g/dL      Albumin 4.00 g/dL      ALT (SGPT) 21 U/L      AST (SGOT) 15 U/L      Alkaline Phosphatase 113 U/L      Total Bilirubin 0.4 mg/dL      eGFR Non African Amer 61 mL/min/1.73      Globulin 3.8 gm/dL      A/G Ratio 1.1 g/dL      BUN/Creatinine Ratio 19.5     Anion Gap 18.6 mmol/L     Ketone Bodies, Serum (Not performed at [place]) [[id number]] Collected:  12/30/18 1821    Specimen:  Blood Updated:  12/30/18 1909     Narrative:       The following orders were created for panel order Ketone Bodies, Serum (Not performed at [place]).  Procedure                               Abnormality         Status                     ---------                               -----------         ------                     Beta Hydroxybutyrate Richard...[[id number]]  Abnormal            Final result                 Please view results for these tests on the individual orders.    Beta Hydroxybutyrate Quantitative [[id number]]  (Abnormal) Collected:  12/30/18 1821    Specimen:  Blood Updated:  12/30/18 1909     Beta-Hydroxybutyrate Quant 0.528 mmol/L     Urinalysis With Microscopic If Indicated (No Culture) - Urine, Clean Catch [[id number]]  (Abnormal) Collected:  12/30/18 1843    Specimen:  Urine, Clean Catch Updated:  12/30/18 1900     Color, UA Yellow     Appearance, UA Clear     pH, UA 5.5     Specific Gravity, UA >=1.030     Glucose, UA >=1000 mg/dL (3+)     Ketones, UA Trace     Bilirubin, UA Negative     Blood, UA Negative     Protein, UA Negative     Leuk Esterase, UA Negative     Nitrite, UA Negative     Urobilinogen, UA 0.2 E.U./dL    Narrative:       Urine microscopic not indicated.    CBC & Differential [[id number]] Collected:  12/30/18 1821    Specimen:  Blood Updated:  12/30/18 1858    Narrative:       The following orders were created for panel order CBC & Differential.  Procedure                               Abnormality         Status                     ---------                               -----------         ------                     Scan Slide[[id number]]                                                                  CBC Auto Differential[[id number]]        Abnormal            Final result                 Please view results for these tests on the individual orders.    CBC Auto Differential [[id number]]  (Abnormal) Collected:  12/30/18 1821    Specimen:  Blood Updated:  12/30/18 1858     WBC 16.01 10*3/mm3      RBC 5.59 10*6/mm3       Hemoglobin 16.8 g/dL      Hematocrit 46.6 %      MCV 83.4 fL      MCH 30.1 pg      MCHC 36.1 g/dL      RDW 12.6 %      RDW-SD 38.0 fl      MPV 12.0 fL      Platelets 236 10*3/mm3      Neutrophil % 63.6 %      Lymphocyte % 26.0 %      Monocyte % 10.1 %      Eosinophil % 0.2 %      Basophil % 0.1 %      Immature Grans % 0.6 %      Neutrophils, Absolute 10.18 10*3/mm3      Lymphocytes, Absolute 4.16 10*3/mm3      Monocytes, Absolute 1.61 10*3/mm3      Eosinophils, Absolute 0.04 10*3/mm3      Basophils, Absolute 0.02 10*3/mm3      Immature Grans, Absolute 0.09 10*3/mm3           Imaging Results (last 72 hours)     Procedure Component Value Units Date/Time    MRI Brain With & Without Contrast [[id number]] Collected:  01/01/19 0842     Updated:  01/01/19 0852    Narrative:       MRI BRAIN W WO CONTRAST-     INDICATIONS: Disequilibrium     TECHNIQUE: Multiplanar multisequence magnetic resonance imaging of the  brain, without and with intravenous contrast material.     COMPARISON:  1/26/2017. Correlated with facial CT from 12/30/2018     FINDINGS:     The diffusion-weighted images show no restricted diffusion to suggest  acute infarct.     The midline structures appear unremarkable.     The brain parenchyma shows normal signal intensity. No enhancing lesions  of brain. Vascular flow related artifact is seen on the postcontrast  images     Flow voids in the major arteries at the base of the brain appear  unremarkable.     Small likely mucous retention cyst or polyp in the right maxillary  sinus. The paranasal sinuses, mastoid air cells, and orbits appear  otherwise unremarkable.     At the previously CT-demonstrated abscess of the left upper lip,  marginally enhancing fluid collection with surrounding edema is also  noted on this exam, without obvious change.       Impression:          1. No acute infarct. No enhancing lesions of brain. No conspicuous white  matter disease. Follow-up as indications persist  2. Redemonstration  of abscess of the left upper lip        This report was finalized on 1/1/2019 8:49 AM by Dr. Hima Ivory M.D.       CT Guided Biopsy Aspiration Injection [[id number]] Collected:  12/31/18 1648     Updated:  12/31/18 1655    Narrative:       CT GUIDED LEFT LIP COLLECTION ASPIRATION     HISTORY: Left lip abscess     Radiation dose reduction techniques were utilized, including automated  exposure control and exposure modulation based on body size.     Procedure explained to the patient with potential complications. After  signed informed consent was obtained the patient was placed in the  supine position on the CT table. . After appropriate prep and drape of  the skin surface with Betadine, 1% lidocaine for local anesthesia. An  18-gauge needle was advanced into the fluid collection and only 0.5 cc  of mixed thick, bloody and yellow fluid was able to be aspirated.     CONCLUSION: Successful left ligament fluid collection aspiration using  CT guidance. No complications encountered.     This report was finalized on 12/31/2018 4:50 PM by Dr. Kiko Edwards M.D.       CT Facial Bones With Contrast [[id number]] Collected:  12/30/18 2116     Updated:  12/30/18 2126    Narrative:       CT OF THE FACIAL BONES WITH CONTRAST     HISTORY: Left-sided facial swelling     COMPARISON: None available.     TECHNIQUE: Axial CT imaging was obtained from the facial bones following  administration of IV contrast. Coronal and sagittal reformatted images  were obtained.        FINDINGS:  Visualized portions of the brain parenchyma appear unremarkable. Orbits  appear unremarkable. There is a mucous retention cyst seen within the  right maxillary sinus. The remainder of the visualized paranasal sinuses  and mastoid air cells appear clear. There is extensive soft tissue  swelling which is noted overlying the left upper lip. It is associated  with a rim-enhancing collection, characteristic of abscess. The  collection measures at least 2.5  x 1.5 cm. No extension into the oral  cavity is identified. No aggressive osseous abnormalities are seen. The  nasopharynx and oropharynx, and hypopharynx all appear unremarkable. The  patient does have some enlarged cervical lymph nodes on the left. For  example, one node inferior to the left mandible measures up to 2.4 x 1.0  cm. These are likely reactive, given the adjacent inflammation. No  abnormality is seen on the right.          Impression:          1. Extensive soft tissue swelling seen overlying the left upper lip,  characteristic of cellulitis. In addition, there is a rim-enhancing  collection, which measures at least 2.5 x 1.5 cm, characteristic of  abscess. I do not see any extension into the oral cavity, and no  aggressive osseous abnormalities are seen. Enlarged cervical lymph nodes  are noted on the left, likely reactive.     Radiation dose reduction techniques were utilized, including automated  exposure control and exposure modulation based on body size.     This report was finalized on 12/30/2018 9:23 PM by Dr. Taylor Sarabia M.D.               Medication Review:     Hospital Medications (active)       Dose Frequency Start End    acetaminophen (TYLENOL) tablet 325 mg 325 mg Every 4 Hours PRN 12/30/2018     Sig - Route: Take 1 tablet by mouth Every 4 (Four) Hours As Needed for Mild Pain . - Oral    acetaminophen (TYLENOL) tablet 650 mg 650 mg Every 4 Hours PRN 12/30/2018     Sig - Route: Take 2 tablets by mouth Every 4 (Four) Hours As Needed for Mild Pain . - Oral    atorvastatin (LIPITOR) tablet 20 mg 20 mg Daily 12/31/2018     Sig - Route: Take 1 tablet by mouth Daily. - Oral    cholecalciferol (VITAMIN D3) tablet 5,000 Units 5,000 Units Daily 12/31/2018     Sig - Route: Take 5 tablets by mouth Daily. - Oral    Dapagliflozin Propanediol tablet 10 mg 10 mg Daily 12/31/2018     Sig - Route: Take 10 mg by mouth Daily. - Oral    enoxaparin (LOVENOX) syringe 40 mg 40 mg Every 12 Hours 12/30/2018      Sig - Route: Inject 0.4 mL under the skin into the appropriate area as directed Every 12 (Twelve) Hours. - Subcutaneous    exenatide (BYETTA) injection 10 mcg 10 mcg Every 12 Hours Scheduled 12/30/2018     Sig - Route: Inject 0.04 mL under the skin into the appropriate area as directed Every 12 (Twelve) Hours. - Subcutaneous    Notes to Pharmacy: Patient is non-compliant at home, and does not have own supply, per conversation with RN in ED.    fenofibrate (TRICOR) tablet 145 mg 145 mg Daily 12/31/2018     Sig - Route: Take 1 tablet by mouth Daily. - Oral    gadobenate dimeglumine (MULTIHANCE) injection 20 mL 20 mL Once in Imaging 1/1/2019 1/1/2019    Sig - Route: Infuse 20 mL into a venous catheter Once. - Intravenous    HYDROcodone-acetaminophen (NORCO) 7.5-325 MG per tablet 1 tablet 1 tablet 4 Times Daily PRN 1/1/2019 1/11/2019    Sig - Route: Take 1 tablet by mouth 4 (Four) Times a Day As Needed for Moderate Pain . - Oral    HYDROmorphone (DILAUDID) injection 1 mg 1 mg Every 4 Hours PRN 12/30/2018 1/9/2019    Sig - Route: Infuse 1 mL into a venous catheter Every 4 (Four) Hours As Needed for Severe Pain . - Intravenous    Influenza Vac Subunit Quad (FLUCELVAX) injection 0.5 mL 0.5 mL Once 1/1/2019 1/1/2019    Sig - Route: Inject 0.5 mL into the appropriate muscle as directed by prescriber 1 (One) Time. - Intramuscular    insulin lispro (humaLOG) injection 1-20 Units 1-20 Units 3 Times Daily With Meals 12/31/2018     Sig - Route: Inject 1-20 Units under the skin into the appropriate area as directed 3 (Three) Times a Day With Meals. - Subcutaneous    linagliptin (TRADJENTA) tablet 5 mg 5 mg Daily 12/31/2018     Sig - Route: Take 1 tablet by mouth Daily. - Oral    magnesium sulfate 2g/50 mL (PREMIX) infusion 2 g As Needed 12/30/2018 1/6/2019    Sig - Route: Infuse 50 mL into a venous catheter As Needed (to be given for Magnesium levels 1.8 or less PRN.). - Intravenous    metFORMIN (GLUCOPHAGE) tablet 1,000 mg  "1,000 mg 2 Times Daily With Meals 1/2/2019     Sig - Route: Take 1 tablet by mouth 2 (Two) Times a Day With Meals. - Oral    Cosign for Ordering: Accepted by Reyes, Rosenberg Acosta, MD on 12/31/2018 11:03 AM    nebivolol (BYSTOLIC) tablet 5 mg 5 mg Daily 12/31/2018     Sig - Route: Take 1 tablet by mouth Daily. - Oral    nicotine (NICODERM CQ) 14 MG/24HR patch 1 patch 1 patch Every 24 Hours Scheduled 12/31/2018     Sig - Route: Place 1 patch on the skin as directed by provider Daily. - Transdermal    NIFEdipine XL (PROCARDIA XL) 24 hr tablet 90 mg 90 mg Daily 12/31/2018     Sig - Route: Take 90 mg by mouth Daily. - Oral    olmesartan (BENICAR) tablet 40 mg 40 mg Daily 12/31/2018     Sig - Route: Take 1 tablet by mouth Daily. - Oral    pantoprazole (PROTONIX) EC tablet 40 mg 40 mg Every Morning 12/31/2018     Sig - Route: Take 1 tablet by mouth Every Morning. - Oral    perflutren (DEFINITY) 8.476 mg in sodium chloride 0.9 % 10 mL injection 3 mL Once 1/1/2019 1/1/2019    Sig - Route: Infuse 3 mL into a venous catheter 1 (One) Time. - Intravenous    Pharmacy to dose vancomycin  Continuous PRN 12/30/2018 1/20/2019    Sig - Route: Continuous As Needed for Consult. - Does not apply    pioglitazone (ACTOS) tablet 30 mg 30 mg Daily 12/31/2018     Sig - Route: Take 1 tablet by mouth Daily. - Oral    piperacillin-tazobactam (ZOSYN) 4.5 g in iso-osmotic dextrose 100 mL IVPB (premix) 4.5 g Once 1/1/2019     Sig - Route: Infuse 100 mL into a venous catheter 1 (One) Time. - Intravenous    piperacillin-tazobactam (ZOSYN) 4.5 g in iso-osmotic dextrose 100 mL IVPB (premix) 4.5 g Every 8 Hours 1/1/2019 1/8/2019    Sig - Route: Infuse 100 mL into a venous catheter Every 8 (Eight) Hours. - Intravenous    potassium chloride (KLOR-CON) packet 40 mEq 40 mEq As Needed 12/30/2018     Sig - Route: Take 40 mEq by mouth As Needed (potassium replacement, see admin instructions). - Oral    Linked Group 1:  \"Or\" Linked Group Details        " "potassium chloride (MICRO-K) CR capsule 40 mEq 40 mEq As Needed 12/30/2018     Sig - Route: Take 4 capsules by mouth As Needed (potassium replacement.  see admin instructions). - Oral    Linked Group 1:  \"Or\" Linked Group Details        potassium chloride 10 mEq in 100 mL IVPB 10 mEq Every 1 Hour PRN 12/30/2018     Sig - Route: Infuse 100 mL into a venous catheter Every 1 (One) Hour As Needed (potassium protocol PERIPHERAL - see admin instructions). - Intravenous    Linked Group 1:  \"Or\" Linked Group Details        sodium chloride (HYPERTONIC) 3 % infusion 60 mL/hr Once 12/31/2018     Sig - Route: Infuse 60 mL/hr into a venous catheter 1 (One) Time. - Intravenous    sodium chloride 0.9 % flush 1-10 mL 1-10 mL As Needed 12/31/2018     Sig - Route: Infuse 1-10 mL into a venous catheter As Needed for Line Care. - Intravenous    sodium chloride 0.9 % flush 10 mL 10 mL Every 12 Hours Scheduled 12/31/2018     Sig - Route: Infuse 10 mL into a venous catheter Every 12 (Twelve) Hours. - Intravenous    sodium chloride 0.9 % flush 10 mL 10 mL Every 12 Hours Scheduled 12/31/2018     Sig - Route: Infuse 10 mL into a venous catheter Every 12 (Twelve) Hours. - Intravenous    sodium chloride 0.9 % flush 10 mL 10 mL As Needed 12/31/2018     Sig - Route: Infuse 10 mL into a venous catheter As Needed for Line Care (After Medication Administration). - Intravenous    sodium chloride 0.9 % flush 20 mL 20 mL As Needed 12/31/2018     Sig - Route: Infuse 20 mL into a venous catheter As Needed for Line Care (After Blood Draws or Blood Product Administration). - Intravenous    sodium chloride 0.9 % flush 3 mL 3 mL Every 12 Hours Scheduled 12/30/2018     Sig - Route: Infuse 3 mL into a venous catheter Every 12 (Twelve) Hours. - Intravenous    sodium chloride 0.9 % flush 3 mL 3 mL Every 12 Hours Scheduled 12/31/2018     Sig - Route: Infuse 3 mL into a venous catheter Every 12 (Twelve) Hours. - Intravenous    sodium chloride 0.9 % flush 3-10 mL " 3-10 mL As Needed 12/30/2018     Sig - Route: Infuse 3-10 mL into a venous catheter As Needed for Line Care. - Intravenous    vancomycin 2000 mg/500 mL 0.9% NS IVPB ([facility]) 2,000 mg Every 8 Hours 1/1/2019 1/21/2019    Sig - Route: Infuse 500 mL into a venous catheter Every 8 (Eight) Hours. - Intravenous    zolpidem (AMBIEN) tablet 5 mg 5 mg Nightly PRN 12/30/2018 1/9/2019    Sig - Route: Take 1 tablet by mouth At Night As Needed for Sleep. - Oral    vancomycin 1500 mg/500 mL 0.9% NS IVPB ([facility]) (Discontinued) 10 mg/kg × 139 kg Every 8 Hours 12/31/2018 12/31/2018    Sig - Route: Infuse 500 mL into a venous catheter Every 8 (Eight) Hours. - Intravenous    Reason for Discontinue: Dose adjustment          ampicillin-sulbactam 3 g Intravenous Q6H   atorvastatin 20 mg Oral Daily   cholecalciferol 5,000 Units Oral Daily   Dapagliflozin Propanediol 10 mg Oral Daily   enoxaparin 40 mg Subcutaneous Q12H   exenatide 10 mcg Subcutaneous Q12H   fenofibrate 145 mg Oral Daily   insulin lispro 1-20 Units Subcutaneous TID With Meals   linagliptin 5 mg Oral Daily   metFORMIN 1,000 mg Oral BID With Meals   nebivolol 5 mg Oral Daily   nicotine 1 patch Transdermal Q24H   NIFEdipine XL 90 mg Oral Daily   olmesartan 40 mg Oral Daily   pantoprazole 40 mg Oral QAM   pioglitazone 30 mg Oral Daily   sodium chloride 60 mL/hr Intravenous Once   sodium chloride 10 mL Intravenous Q12H   sodium chloride 10 mL Intravenous Q12H   sodium chloride 3 mL Intravenous Q12H   sodium chloride 3 mL Intravenous Q12H       Assessment/Plan         Cellulitis of left upper lip with abscess formation with sepsis    Azotemia    Glucosuria    Dehydration    Hyperkalemia    Hyponatremia    Hyperglycemia    Labile hypertension    Uncontrolled type 2 diabetes mellitus with hyperglycemia (CMS/HCC)      C&S  Strep B      Plan :    IV Unasyn  Home soon on 10 days of PO Augmentin  875 mg BID    Dinesh Carter MD  01/02/19  1:08 PM

## 2019-01-02 NOTE — PLAN OF CARE
Problem: Patient Care Overview  Goal: Plan of Care Review  Outcome: Ongoing (interventions implemented as appropriate)   01/02/19 0302   Coping/Psychosocial   Plan of Care Reviewed With patient   Plan of Care Review   Progress improving   OTHER   Outcome Summary pt still requiring iv pain meds frequently. swelling seems to have improved throughout shift. pt encouraged to ambulate. will continue to monitor.      Goal: Individualization and Mutuality  Outcome: Ongoing (interventions implemented as appropriate)    Goal: Discharge Needs Assessment  Outcome: Ongoing (interventions implemented as appropriate)      Problem: Pain, Acute (Adult)  Goal: Acceptable Pain Control/Comfort Level  Outcome: Ongoing (interventions implemented as appropriate)      Problem: Fall Risk (Adult)  Goal: Absence of Fall  Outcome: Ongoing (interventions implemented as appropriate)      Problem: Skin Injury Risk (Adult)  Goal: Skin Health and Integrity  Outcome: Ongoing (interventions implemented as appropriate)      Problem: Infection, Risk/Actual (Adult)  Goal: Infection Prevention/Resolution  Outcome: Ongoing (interventions implemented as appropriate)      Problem: Diabetes, Type 2 (Adult)  Goal: Signs and Symptoms of Listed Potential Problems Will be Absent, Minimized or Managed (Diabetes, Type 2)  Outcome: Ongoing (interventions implemented as appropriate)

## 2019-01-02 NOTE — PLAN OF CARE
Problem: Patient Care Overview  Goal: Plan of Care Review  Outcome: Ongoing (interventions implemented as appropriate)   01/02/19 0371   Coping/Psychosocial   Plan of Care Reviewed With patient   Plan of Care Review   Progress improving   OTHER   Outcome Summary Patient has been anxious and has tried to leave AMA today.  was able to coax patient into returning and is now being tranferred to 5S. Patient still receiving abx. Patient complains of pain in left face and has been medicated 3 times for pain today. No nausea or SOA> Was stable on feet during my shift. Will continue to monitor and assist patient as needed. Sister at bedside.       Problem: Pain, Acute (Adult)  Goal: Acceptable Pain Control/Comfort Level  Outcome: Ongoing (interventions implemented as appropriate)      Problem: Fall Risk (Adult)  Goal: Absence of Fall  Outcome: Ongoing (interventions implemented as appropriate)      Problem: Skin Injury Risk (Adult)  Goal: Skin Health and Integrity  Outcome: Ongoing (interventions implemented as appropriate)      Problem: Infection, Risk/Actual (Adult)  Goal: Infection Prevention/Resolution  Outcome: Ongoing (interventions implemented as appropriate)      Problem: Diabetes, Type 2 (Adult)  Goal: Signs and Symptoms of Listed Potential Problems Will be Absent, Minimized or Managed (Diabetes, Type 2)  Outcome: Ongoing (interventions implemented as appropriate)

## 2019-01-02 NOTE — NURSING NOTE
Received care of patient at 0900 from initial RN Rudy Michelle. Patient refusing medications and blood glucose to be taken. Become angry and had me remove his peripheral IVs and took off his heart monitor himself. Refused to sign AMA paper and walked off floor at 1100. Returned to floor with sister and  Cee Garcia at 1123. Bed board called and placed back into [facility].

## 2019-01-03 VITALS
BODY MASS INDEX: 43.39 KG/M2 | WEIGHT: 309.9 LBS | DIASTOLIC BLOOD PRESSURE: 76 MMHG | HEIGHT: 71 IN | TEMPERATURE: 98.8 F | OXYGEN SATURATION: 93 % | SYSTOLIC BLOOD PRESSURE: 132 MMHG | RESPIRATION RATE: 18 BRPM | HEART RATE: 89 BPM

## 2019-01-03 PROBLEM — K12.2 CELLULITIS OF MOUTH: Status: RESOLVED | Noted: 2018-12-30 | Resolved: 2019-01-03

## 2019-01-03 PROBLEM — R81 GLUCOSURIA: Status: RESOLVED | Noted: 2018-12-31 | Resolved: 2019-01-03

## 2019-01-03 PROBLEM — E11.65 UNCONTROLLED TYPE 2 DIABETES MELLITUS WITH HYPERGLYCEMIA (HCC): Status: RESOLVED | Noted: 2018-12-31 | Resolved: 2019-01-03

## 2019-01-03 PROBLEM — E87.5 HYPERKALEMIA: Status: RESOLVED | Noted: 2018-12-31 | Resolved: 2019-01-03

## 2019-01-03 PROBLEM — E87.1 HYPONATREMIA: Status: RESOLVED | Noted: 2018-12-31 | Resolved: 2019-01-03

## 2019-01-03 PROBLEM — R79.89 AZOTEMIA: Status: RESOLVED | Noted: 2018-12-31 | Resolved: 2019-01-03

## 2019-01-03 PROBLEM — R09.89 LABILE HYPERTENSION: Status: RESOLVED | Noted: 2018-12-31 | Resolved: 2019-01-03

## 2019-01-03 PROBLEM — E86.0 DEHYDRATION: Status: RESOLVED | Noted: 2018-12-31 | Resolved: 2019-01-03

## 2019-01-03 PROBLEM — R73.9 HYPERGLYCEMIA: Status: RESOLVED | Noted: 2018-12-31 | Resolved: 2019-01-03

## 2019-01-03 LAB
ALBUMIN SERPL-MCNC: 3.1 G/DL (ref 3.5–5.2)
ALBUMIN/GLOB SERPL: 1 G/DL
ALP SERPL-CCNC: 90 U/L (ref 39–117)
ALT SERPL W P-5'-P-CCNC: 21 U/L (ref 1–41)
ANION GAP SERPL CALCULATED.3IONS-SCNC: 10.9 MMOL/L
AST SERPL-CCNC: 30 U/L (ref 1–40)
BACTERIA FLD CULT: ABNORMAL
BACTERIA FLD CULT: ABNORMAL
BASOPHILS # BLD AUTO: 0.03 10*3/MM3 (ref 0–0.2)
BASOPHILS NFR BLD AUTO: 0.3 % (ref 0–1.5)
BILIRUB SERPL-MCNC: 0.2 MG/DL (ref 0.1–1.2)
BUN BLD-MCNC: 9 MG/DL (ref 6–20)
BUN/CREAT SERPL: 12.3 (ref 7–25)
CALCIUM SPEC-SCNC: 8.7 MG/DL (ref 8.6–10.5)
CHLORIDE SERPL-SCNC: 94 MMOL/L (ref 98–107)
CO2 SERPL-SCNC: 27.1 MMOL/L (ref 22–29)
CREAT BLD-MCNC: 0.73 MG/DL (ref 0.76–1.27)
DEPRECATED RDW RBC AUTO: 40.6 FL (ref 37–54)
EOSINOPHIL # BLD AUTO: 0.21 10*3/MM3 (ref 0–0.7)
EOSINOPHIL NFR BLD AUTO: 2.2 % (ref 0.3–6.2)
ERYTHROCYTE [DISTWIDTH] IN BLOOD BY AUTOMATED COUNT: 12.6 % (ref 11.5–14.5)
GFR SERPL CREATININE-BSD FRML MDRD: 116 ML/MIN/1.73
GLOBULIN UR ELPH-MCNC: 3.2 GM/DL
GLUCOSE BLD-MCNC: 332 MG/DL (ref 65–99)
GLUCOSE BLDC GLUCOMTR-MCNC: 280 MG/DL (ref 70–130)
GLUCOSE BLDC GLUCOMTR-MCNC: 306 MG/DL (ref 70–130)
GRAM STN SPEC: ABNORMAL
GRAM STN SPEC: ABNORMAL
HCT VFR BLD AUTO: 42.6 % (ref 40.4–52.2)
HGB BLD-MCNC: 14.9 G/DL (ref 13.7–17.6)
IMM GRANULOCYTES # BLD AUTO: 0.07 10*3/MM3 (ref 0–0.03)
IMM GRANULOCYTES NFR BLD AUTO: 0.7 % (ref 0–0.5)
INSULIN SERPL-ACNC: 11 UIU/ML
LYMPHOCYTES # BLD AUTO: 4.23 10*3/MM3 (ref 0.9–4.8)
LYMPHOCYTES NFR BLD AUTO: 45 % (ref 19.6–45.3)
MAGNESIUM SERPL-MCNC: 2 MG/DL (ref 1.6–2.6)
MCH RBC QN AUTO: 31.1 PG (ref 27–32.7)
MCHC RBC AUTO-ENTMCNC: 35 G/DL (ref 32.6–36.4)
MCV RBC AUTO: 88.9 FL (ref 79.8–96.2)
MONOCYTES # BLD AUTO: 0.86 10*3/MM3 (ref 0.2–1.2)
MONOCYTES NFR BLD AUTO: 9.2 % (ref 5–12)
NEUTROPHILS # BLD AUTO: 3.99 10*3/MM3 (ref 1.9–8.1)
NEUTROPHILS NFR BLD AUTO: 42.6 % (ref 42.7–76)
PLATELET # BLD AUTO: 235 10*3/MM3 (ref 140–500)
PMV BLD AUTO: 11.7 FL (ref 6–12)
POTASSIUM BLD-SCNC: 4 MMOL/L (ref 3.5–5.2)
PROT SERPL-MCNC: 6.3 G/DL (ref 6–8.5)
RBC # BLD AUTO: 4.79 10*6/MM3 (ref 4.6–6)
SODIUM BLD-SCNC: 132 MMOL/L (ref 136–145)
WBC NRBC COR # BLD: 9.39 10*3/MM3 (ref 4.5–10.7)

## 2019-01-03 PROCEDURE — 25010000003 AMPICILLIN-SULBACTAM PER 1.5 G: Performed by: INTERNAL MEDICINE

## 2019-01-03 PROCEDURE — 80053 COMPREHEN METABOLIC PANEL: CPT | Performed by: FAMILY MEDICINE

## 2019-01-03 PROCEDURE — 25010000002 HYDROMORPHONE 1 MG/ML SOLUTION: Performed by: FAMILY MEDICINE

## 2019-01-03 PROCEDURE — 25010000002 ENOXAPARIN PER 10 MG: Performed by: FAMILY MEDICINE

## 2019-01-03 PROCEDURE — 85025 COMPLETE CBC W/AUTO DIFF WBC: CPT | Performed by: FAMILY MEDICINE

## 2019-01-03 PROCEDURE — 63710000001 INSULIN GLARGINE PER 5 UNITS: Performed by: FAMILY MEDICINE

## 2019-01-03 PROCEDURE — 82962 GLUCOSE BLOOD TEST: CPT

## 2019-01-03 PROCEDURE — 63710000001 INSULIN LISPRO (HUMAN) PER 5 UNITS: Performed by: FAMILY MEDICINE

## 2019-01-03 PROCEDURE — 36415 COLL VENOUS BLD VENIPUNCTURE: CPT | Performed by: FAMILY MEDICINE

## 2019-01-03 PROCEDURE — 83735 ASSAY OF MAGNESIUM: CPT | Performed by: FAMILY MEDICINE

## 2019-01-03 RX ORDER — FLUCONAZOLE 100 MG/1
100 TABLET ORAL EVERY 24 HOURS
Qty: 14 TABLET | Refills: 0 | Status: SHIPPED | OUTPATIENT
Start: 2019-01-03 | End: 2019-01-17

## 2019-01-03 RX ORDER — INSULIN GLARGINE 100 [IU]/ML
32 INJECTION, SOLUTION SUBCUTANEOUS EVERY 12 HOURS SCHEDULED
Qty: 5 EACH | Status: ON HOLD | OUTPATIENT
Start: 2019-01-03 | End: 2021-05-14 | Stop reason: SDUPTHER

## 2019-01-03 RX ORDER — FENOFIBRATE 145 MG/1
145 TABLET, COATED ORAL DAILY
Qty: 90 TABLET | Refills: 4 | Status: SHIPPED | OUTPATIENT
Start: 2019-01-04 | End: 2021-05-05 | Stop reason: HOSPADM

## 2019-01-03 RX ORDER — AMOXICILLIN AND CLAVULANATE POTASSIUM 875; 125 MG/1; MG/1
1 TABLET, FILM COATED ORAL 2 TIMES DAILY
Qty: 20 TABLET | Refills: 1 | Status: SHIPPED | OUTPATIENT
Start: 2019-01-03 | End: 2019-01-13

## 2019-01-03 RX ORDER — INSULIN GLARGINE 100 [IU]/ML
30 INJECTION, SOLUTION SUBCUTANEOUS EVERY 12 HOURS SCHEDULED
Qty: 5 EACH | Refills: 12 | Status: SHIPPED | OUTPATIENT
Start: 2019-01-03 | End: 2019-01-03 | Stop reason: HOSPADM

## 2019-01-03 RX ORDER — NIFEDIPINE 90 MG/1
90 TABLET, EXTENDED RELEASE ORAL DAILY
Qty: 90 TABLET | Refills: 4 | Status: SHIPPED | OUTPATIENT
Start: 2019-01-04 | End: 2021-05-05 | Stop reason: HOSPADM

## 2019-01-03 RX ORDER — FLUCONAZOLE 100 MG/1
100 TABLET ORAL EVERY 24 HOURS
Status: DISCONTINUED | OUTPATIENT
Start: 2019-01-03 | End: 2019-01-03 | Stop reason: HOSPADM

## 2019-01-03 RX ORDER — AMOXICILLIN AND CLAVULANATE POTASSIUM 875; 125 MG/1; MG/1
1 TABLET, FILM COATED ORAL EVERY 12 HOURS SCHEDULED
Status: DISCONTINUED | OUTPATIENT
Start: 2019-01-03 | End: 2019-01-03 | Stop reason: HOSPADM

## 2019-01-03 RX ORDER — INSULIN GLARGINE 100 [IU]/ML
32 INJECTION, SOLUTION SUBCUTANEOUS EVERY 12 HOURS SCHEDULED
Status: DISCONTINUED | OUTPATIENT
Start: 2019-01-03 | End: 2019-01-03 | Stop reason: HOSPADM

## 2019-01-03 RX ORDER — NEBIVOLOL 5 MG/1
5 TABLET ORAL DAILY
Qty: 30 TABLET | Refills: 12 | Status: SHIPPED | OUTPATIENT
Start: 2019-01-04 | End: 2021-05-05 | Stop reason: HOSPADM

## 2019-01-03 RX ORDER — PIOGLITAZONEHYDROCHLORIDE 30 MG/1
30 TABLET ORAL DAILY
Qty: 90 TABLET | Refills: 4
Start: 2019-01-03

## 2019-01-03 RX ORDER — OLMESARTAN MEDOXOMIL 40 MG/1
40 TABLET ORAL DAILY
Qty: 90 TABLET | Refills: 4 | Status: SHIPPED | OUTPATIENT
Start: 2019-01-04 | End: 2021-05-05 | Stop reason: HOSPADM

## 2019-01-03 RX ADMIN — AMOXICILLIN AND CLAVULANATE POTASSIUM 1 TABLET: 875; 125 TABLET, FILM COATED ORAL at 15:22

## 2019-01-03 RX ADMIN — METFORMIN HYDROCHLORIDE 1000 MG: 1000 TABLET ORAL at 08:44

## 2019-01-03 RX ADMIN — AMPICILLIN SODIUM AND SULBACTAM SODIUM 3 G: 2; 1 INJECTION, POWDER, FOR SOLUTION INTRAMUSCULAR; INTRAVENOUS at 08:43

## 2019-01-03 RX ADMIN — INSULIN GLARGINE 30 UNITS: 100 INJECTION, SOLUTION SUBCUTANEOUS at 08:40

## 2019-01-03 RX ADMIN — ATORVASTATIN CALCIUM 20 MG: 20 TABLET, FILM COATED ORAL at 08:39

## 2019-01-03 RX ADMIN — HYDROMORPHONE HYDROCHLORIDE 1 MG: 1 INJECTION, SOLUTION INTRAMUSCULAR; INTRAVENOUS; SUBCUTANEOUS at 04:42

## 2019-01-03 RX ADMIN — OLMESARTAN MEDOXOMIL 40 MG: 40 TABLET ORAL at 09:22

## 2019-01-03 RX ADMIN — LINAGLIPTIN 5 MG: 5 TABLET, FILM COATED ORAL at 08:40

## 2019-01-03 RX ADMIN — HYDROMORPHONE HYDROCHLORIDE 1 MG: 1 INJECTION, SOLUTION INTRAMUSCULAR; INTRAVENOUS; SUBCUTANEOUS at 15:22

## 2019-01-03 RX ADMIN — FLUCONAZOLE 100 MG: 100 TABLET ORAL at 15:21

## 2019-01-03 RX ADMIN — AMPICILLIN SODIUM AND SULBACTAM SODIUM 3 G: 2; 1 INJECTION, POWDER, FOR SOLUTION INTRAMUSCULAR; INTRAVENOUS at 03:30

## 2019-01-03 RX ADMIN — ENOXAPARIN SODIUM 40 MG: 40 INJECTION SUBCUTANEOUS at 08:40

## 2019-01-03 RX ADMIN — PIOGLITAZONE 30 MG: 30 TABLET ORAL at 08:39

## 2019-01-03 RX ADMIN — HYDROMORPHONE HYDROCHLORIDE 1 MG: 1 INJECTION, SOLUTION INTRAMUSCULAR; INTRAVENOUS; SUBCUTANEOUS at 08:41

## 2019-01-03 RX ADMIN — SODIUM CHLORIDE, PRESERVATIVE FREE 10 ML: 5 INJECTION INTRAVENOUS at 08:45

## 2019-01-03 RX ADMIN — HYDROCODONE BITARTRATE AND ACETAMINOPHEN 1 TABLET: 7.5; 325 TABLET ORAL at 02:58

## 2019-01-03 RX ADMIN — HYDROCODONE BITARTRATE AND ACETAMINOPHEN 1 TABLET: 7.5; 325 TABLET ORAL at 12:29

## 2019-01-03 RX ADMIN — FENOFIBRATE 145 MG: 145 TABLET ORAL at 08:39

## 2019-01-03 RX ADMIN — NIFEDIPINE 90 MG: 60 TABLET, FILM COATED, EXTENDED RELEASE ORAL at 08:39

## 2019-01-03 RX ADMIN — PANTOPRAZOLE SODIUM 40 MG: 40 TABLET, DELAYED RELEASE ORAL at 06:22

## 2019-01-03 RX ADMIN — NEBIVOLOL HYDROCHLORIDE 5 MG: 5 TABLET ORAL at 08:40

## 2019-01-03 RX ADMIN — INSULIN LISPRO 10 UNITS: 100 INJECTION, SOLUTION INTRAVENOUS; SUBCUTANEOUS at 12:29

## 2019-01-03 RX ADMIN — INSULIN LISPRO 10 UNITS: 100 INJECTION, SOLUTION INTRAVENOUS; SUBCUTANEOUS at 08:44

## 2019-01-03 RX ADMIN — VITAMIN D, TAB 1000IU (100/BT) 5000 UNITS: 25 TAB at 08:39

## 2019-01-03 NOTE — CONSULTS
PICC not indicated at this time according to notes from [facility]... Pt has working PIV access.. ALl plans for d/c meds are oral meds at this time.. Please consult IV therapy if needs or  d/c plan for IV antibiotics change.

## 2019-01-03 NOTE — PROGRESS NOTES
Graham Meléndez  48 y.o.  1970  [id number]  [id number]  01/03/19     LOS: 4 days   Patient Care Team:  Reyes, Rosenberg Acosta, MD as PCP - General (Family Medicine)  Reyes, Rosenberg Acosta, MD (Family Medicine)    Chief Complaint   Patient presents with   • Hyperglycemia     pt c/o syncope and right sided facial swelling     Subjective   Patient states that he is not doing anything in the hospital and wants to go home.  He states that he can do all of this at home.  He just wants to go home and take his antibiotics and take care of his blood sugar.    Objective     LABORATORY DATA:  Lab Results (last 24 hours)     Procedure Component Value Units Date/Time    POC Glucose Once [[id number]]  (Abnormal) Collected:  01/03/19 1128    Specimen:  Blood Updated:  01/03/19 1130     Glucose 280 mg/dL     Comprehensive Metabolic Panel [[id number]]  (Abnormal) Collected:  01/03/19 0818    Specimen:  Blood Updated:  01/03/19 0910     Glucose 332 mg/dL      BUN 9 mg/dL      Creatinine 0.73 mg/dL      Sodium 132 mmol/L      Potassium 4.0 mmol/L      Comment: Specimen hemolyzed.  Results may be affected.        Chloride 94 mmol/L      CO2 27.1 mmol/L      Calcium 8.7 mg/dL      Total Protein 6.3 g/dL      Albumin 3.10 g/dL      ALT (SGPT) 21 U/L      Comment: Specimen hemolyzed.  Results may be affected.        AST (SGOT) 30 U/L      Comment: Specimen hemolyzed.  Results may be affected.        Alkaline Phosphatase 90 U/L      Total Bilirubin 0.2 mg/dL      eGFR Non African Amer 116 mL/min/1.73      Globulin 3.2 gm/dL      A/G Ratio 1.0 g/dL      BUN/Creatinine Ratio 12.3     Anion Gap 10.9 mmol/L     Body Fluid Culture - Aspirate, Face [[id number]]  (Abnormal) Collected:  12/31/18 1635    Specimen:  Aspirate from Face Updated:  01/03/19 0706     BF Culture Moderate growth (3+) Streptococcus agalactiae (Group B)     Comment: This organism is considered to be universally susceptible to penicillin.  No further antibiotic testing  will be performed.         Light growth (2+) Candida albicans     Gram Stain Few (2+) Gram positive cocci      Rare (1+) WBCs per low power field    CBC & Differential [[id number]] Collected:  01/03/19 0439    Specimen:  Blood Updated:  01/03/19 0607    Narrative:       The following orders were created for panel order CBC & Differential.  Procedure                               Abnormality         Status                     ---------                               -----------         ------                     CBC Auto Differential[[id number]]        Abnormal            Final result                 Please view results for these tests on the individual orders.    CBC Auto Differential [[id number]]  (Abnormal) Collected:  01/03/19 0439    Specimen:  Blood Updated:  01/03/19 0607     WBC 9.39 10*3/mm3      RBC 4.79 10*6/mm3      Hemoglobin 14.9 g/dL      Hematocrit 42.6 %      MCV 88.9 fL      MCH 31.1 pg      MCHC 35.0 g/dL      RDW 12.6 %      RDW-SD 40.6 fl      MPV 11.7 fL      Platelets 235 10*3/mm3      Neutrophil % 42.6 %      Lymphocyte % 45.0 %      Monocyte % 9.2 %      Eosinophil % 2.2 %      Basophil % 0.3 %      Immature Grans % 0.7 %      Neutrophils, Absolute 3.99 10*3/mm3      Lymphocytes, Absolute 4.23 10*3/mm3      Monocytes, Absolute 0.86 10*3/mm3      Eosinophils, Absolute 0.21 10*3/mm3      Basophils, Absolute 0.03 10*3/mm3      Immature Grans, Absolute 0.07 10*3/mm3     Magnesium [[id number]]  (Normal) Collected:  01/03/19 0439    Specimen:  Blood Updated:  01/03/19 0606     Magnesium 2.0 mg/dL     POC Glucose Once [[id number]]  (Abnormal) Collected:  01/03/19 0559    Specimen:  Blood Updated:  01/03/19 0601     Glucose 306 mg/dL     POC Glucose Once [[id number]]  (Abnormal) Collected:  01/02/19 2124    Specimen:  Blood Updated:  01/02/19 2126     Glucose 265 mg/dL     Blood Culture - Blood, Arm, Left [[id number]] Collected:  12/30/18 1928    Specimen:  Blood from Arm, Left Updated:  01/02/19 1930      Blood Culture No growth at 3 days    Blood Culture - Blood, Arm, Right [[id number]] Collected:  12/30/18 1837    Specimen:  Blood from Arm, Right Updated:  01/02/19 1900     Blood Culture No growth at 3 days    POC Glucose Once [[id number]]  (Abnormal) Collected:  01/02/19 1722    Specimen:  Blood Updated:  01/02/19 1726     Glucose 336 mg/dL     C-Peptide [[id number]] Collected:  12/31/18 0208    Specimen:  Blood Updated:  01/02/19 1510     C-Peptide 3.7 ng/mL      Comment: C-Peptide reference interval is for fasting patients.       Narrative:       Performed at:  [address] [place] [address]  [id number]  : Agustin Centeno PhD, Phone:  [phone]      RADIOGRAPHIC DATA:    See Previous Notes.    Vital Signs  Temp:  [97.4 °F (36.3 °C)-99.9 °F (37.7 °C)] 98.8 °F (37.1 °C)  Heart Rate:  [72-89] 89  Resp:  [18-20] 18  BP: ()/(72-84) 132/76    Physical Exam  PHYSICAL EXAM:    VITAL SIGNS:  T Max  98.8  SD  89  RR  18  B/P  132/76  BMI  42.9.    GENERAL:  Fairly nourish.  Fairly developed.  Morbidly obese.  Fair Affect.    SKIN:  Not diaphoretic.  Fair skin turgor.  Not jaundice.  Swollen, red, and warm of left maxillary and left upper lip has decrease and is better.    HEENMT:  Normocephalic/Atraumatic.  Pinkish palpebral conjunctiva.  Anicteric sclerae.  PERRLA.  EOMI.  Oral mucosal membrane are moist.  Pharynx is not red.    NECK:  Supple.  No JVD.  No bruits.    THORAX AND LUNGS:  Clear to Auscultation.  No rhonchi, wheeze, nor rales.  No tenderness upon deep palpation of the costochondral junction.    CARDIOVASCULAR SYSTEM:  Regular rate and rhythm, no murmur.  Normal S1 / S2.  No S3 / S4.  No heaves.    ABDOMEN:  No Hepatosplenomegaly.  Soft.  Not tender.  Not distended.  Positive  bowel sounds.    EXTREMITIES:  No cyanosis, clubbing, nor edema.  No calf tenderness.    CNS:  Alert.  Oriented x 3.  Cranial nerves are intact.  Sensory / Motor are intact.     Results Review:     I  reviewed the patient's new clinical results.  Discussed with patient.    Assessment/Plan       0)  Cellulitis of Left Upper Lip with Abscess Formation due to Streptococcus agalactiae (Group B) with Sepsis.    1)  Azotemia.    2)  Glucosuria.    3)  Dehydration.    4)  Hyperkalemia.    5)  Hyponatremia.    6)  Hyperglycemia.    7)  Disequilibrium.    8)  Hypothyroidism.    9)  Candida Albicans.    10)  Syncopal Episode.    11)  Labile Hypertension.    12)  Chest Pain with Tightness.    13)  Uncontrolled Diabetes Mellitus Type 2.    14)  History of Sleep Apnea.    15)  History of Hypertension.    16)  History of Hyperlipidemia.    17)  History of Tobacco Abuse.    18)  History of Myofascial Pain.    19)  History of Allergic Rhinitis.    20)  History of Vitamin D Deficiency.    21)  History of Hypertriglyceridemia.    22)  History of Anxiety and Depression.    23)  History of Right Temporal Carbuncle.    24)  History of Gastroesophageal Reflux Disease.    25)  History of Low Back Pain with Radiculopathy.    26)  History of Uncontrolled Diabetes Mellitus Type 2.    PLAN:  Will discharge today since he is stable enough to be discharge.  He is to follow up in 2 weeks.      Cellulitis of left upper lip with abscess formation with sepsis    Azotemia    Glucosuria    Dehydration    Hyperkalemia    Hyponatremia    Hyperglycemia    Labile hypertension    Uncontrolled type 2 diabetes mellitus with hyperglycemia (CMS/[facility])    Rosenberg Acosta Reyes, MD  01/03/19  12:04 PM

## 2019-01-03 NOTE — PROGRESS NOTES
LOS: 4 days   Patient Care Team:  Reyes, Rosenberg Acosta, MD as PCP - General (Family Medicine)  Reyes, Rosenberg Acosta, MD (Family Medicine)        Subjective     Interval History:     Patient Complaints:   Patient Denies:  NVD       Review of Systems:    facila pain    Objective     Vital Signs  Temp:  [97.4 °F (36.3 °C)-99.9 °F (37.7 °C)] 98.8 °F (37.1 °C)  Heart Rate:  [72-89] 89  Resp:  [18-20] 18  BP: ()/(72-84) 132/76    Physical Exam:     General Appearance:    Alert, cooperative, in no acute distress   Head:    Normocephalic, without obvious abnormality, atraumatic   Eyes:            Lids and lashes normal, conjunctivae and sclerae normal, no   icterus, no pallor, corneas clear, PERRLA   Ears:    Ears appear intact with no abnormalities noted   Throat:   No oral lesions, no thrush, oral mucosa moist  L face better   Neck:   No adenopathy, supple, trachea midline, no thyromegaly, no   carotid bruit, no JVD   Back:     No kyphosis present, no scoliosis present, no skin lesions,      erythema or scars, no tenderness to percussion or                   palpation,   range of motion normal   Lungs:     Clear to auscultation,respirations regular, even and                  unlabored    Heart:    Regular rhythm and normal rate, normal S1 and S2, no            murmur, no gallop, no rub, no click   Chest Wall:    No abnormalities observed   Abdomen:     Normal bowel sounds, no masses, no organomegaly, soft        non-tender, non-distended, no guarding, no rebound                tenderness   Rectal:     Deferred   Extremities:   Moves all extremities well, no edema, no cyanosis, no             redness   Pulses:   Pulses palpable and equal bilaterally   Skin:   No bleeding, bruising or rash   Lymph nodes:   No palpable adenopathy   Neurologic:   Cranial nerves 2 - 12 grossly intact, sensation intact, DTR       present and equal bilaterally          Results Review:      Lab Results (last 72 hours)      Procedure Component Value Units Date/Time    POC Glucose Once [[id number]]  (Abnormal) Collected:  01/01/19 1651    Specimen:  Blood Updated:  01/01/19 1654     Glucose 407 mg/dL     POC Glucose Once [[id number]]  (Abnormal) Collected:  01/01/19 1151    Specimen:  Blood Updated:  01/01/19 1153     Glucose 333 mg/dL     Body Fluid Culture - Aspirate, Face [[id number]]  (Abnormal) Collected:  12/31/18 1635    Specimen:  Aspirate from Face Updated:  01/01/19 1041     BF Culture Moderate growth (3+) Streptococcus agalactiae (Group B)     Comment: This organism is considered to be universally susceptible to penicillin.  No further antibiotic testing will be performed.        Gram Stain Few (2+) Gram positive cocci      Rare (1+) WBCs per low power field    Comprehensive Metabolic Panel [[id number]]  (Abnormal) Collected:  01/01/19 0632    Specimen:  Blood Updated:  01/01/19 0744     Glucose 427 mg/dL      BUN 11 mg/dL      Creatinine 0.72 mg/dL      Sodium 131 mmol/L      Potassium 3.9 mmol/L      Chloride 96 mmol/L      CO2 22.9 mmol/L      Calcium 8.4 mg/dL      Total Protein 6.4 g/dL      Albumin 3.10 g/dL      ALT (SGPT) 17 U/L      AST (SGOT) 16 U/L      Alkaline Phosphatase 94 U/L      Total Bilirubin 0.2 mg/dL      eGFR Non African Amer 118 mL/min/1.73      Globulin 3.3 gm/dL      A/G Ratio 0.9 g/dL      BUN/Creatinine Ratio 15.3     Anion Gap 12.1 mmol/L     Magnesium [[id number]]  (Normal) Collected:  01/01/19 0632    Specimen:  Blood Updated:  01/01/19 0728     Magnesium 2.3 mg/dL     CBC & Differential [[id number]] Collected:  01/01/19 0632    Specimen:  Blood Updated:  01/01/19 0718    Narrative:       The following orders were created for panel order CBC & Differential.  Procedure                               Abnormality         Status                     ---------                               -----------         ------                     CBC Auto Differential[[id number]]        Abnormal            Final result                  Please view results for these tests on the individual orders.    CBC Auto Differential [[id number]]  (Abnormal) Collected:  01/01/19 0632    Specimen:  Blood Updated:  01/01/19 0718     WBC 13.04 10*3/mm3      RBC 4.90 10*6/mm3      Hemoglobin 14.9 g/dL      Hematocrit 44.4 %      MCV 90.6 fL      MCH 30.4 pg      MCHC 33.6 g/dL      RDW 12.8 %      RDW-SD 42.1 fl      MPV 11.5 fL      Platelets 205 10*3/mm3      Neutrophil % 51.4 %      Lymphocyte % 38.6 %      Monocyte % 8.1 %      Eosinophil % 1.5 %      Basophil % 0.2 %      Immature Grans % 0.2 %      Neutrophils, Absolute 6.69 10*3/mm3      Lymphocytes, Absolute 5.03 10*3/mm3      Monocytes, Absolute 1.06 10*3/mm3      Eosinophils, Absolute 0.20 10*3/mm3      Basophils, Absolute 0.03 10*3/mm3      Immature Grans, Absolute 0.03 10*3/mm3     Thyroid Peroxidase Antibody [[id number]] Collected:  12/31/18 0208    Specimen:  Blood Updated:  01/01/19 0710     Thyroid Peroxidase Antibody 9 IU/mL     Narrative:       Performed at:  [address] [place] [address]  [id number]  : Agustin Centeno PhD, Phone:  [phone]    POC Glucose Once [[id number]]  (Abnormal) Collected:  01/01/19 0612    Specimen:  Blood Updated:  01/01/19 0614     Glucose 429 mg/dL     Vancomycin, Trough Vancomycin dose due at 2100. Please make sure Vancomycin IV is not infusing before drawing the vancomycin level. Hold vancomycin dose if level is >21 mcg/mL. [[id number]]  (Normal) Collected:  12/31/18 2039    Specimen:  Blood Updated:  12/31/18 2127     Vancomycin Trough 9.30 mcg/mL     POC Glucose Once [[id number]]  (Abnormal) Collected:  12/31/18 2038    Specimen:  Blood Updated:  12/31/18 2039     Glucose 401 mg/dL     Blood Culture - Blood, Arm, Left [[id number]] Collected:  12/30/18 1928    Specimen:  Blood from Arm, Left Updated:  12/31/18 1930     Blood Culture No growth at 24 hours    Blood Culture - Blood, Arm, Right [[id number]] Collected:  12/30/18  1837    Specimen:  Blood from Arm, Right Updated:  12/31/18 1900     Blood Culture No growth at 24 hours    POC Glucose Once [[id number]]  (Abnormal) Collected:  12/31/18 1735    Specimen:  Blood Updated:  12/31/18 1738     Glucose 323 mg/dL     Troponin [[id number]]  (Normal) Collected:  12/31/18 1408    Specimen:  Blood Updated:  12/31/18 1451     Troponin T <0.010 ng/mL     Narrative:       Troponin T Reference Ranges:  Less than 0.03 ng/mL:    Negative for AMI  0.03 to 0.09 ng/mL:      Indeterminant for AMI  Greater than 0.09 ng/mL: Positive for AMI    POC Glucose Once [[id number]]  (Abnormal) Collected:  12/31/18 1131    Specimen:  Blood Updated:  12/31/18 1133     Glucose 310 mg/dL     Troponin [[id number]]  (Normal) Collected:  12/31/18 0604    Specimen:  Blood Updated:  12/31/18 0714     Troponin T <0.010 ng/mL     Narrative:       Troponin T Reference Ranges:  Less than 0.03 ng/mL:    Negative for AMI  0.03 to 0.09 ng/mL:      Indeterminant for AMI  Greater than 0.09 ng/mL: Positive for AMI    Blood Gas, Arterial [[id number]]  (Abnormal) Collected:  12/31/18 0649    Specimen:  Arterial Blood Updated:  12/31/18 0651     Site Arterial: right radial     Conrad's Test Positive     pH, Arterial 7.363 pH units      pCO2, Arterial 48.9 mm Hg      pO2, Arterial 82.8 mm Hg      HCO3, Arterial 27.8 mmol/L      Base Excess, Arterial 1.4 mmol/L      O2 Saturation Calculated 95.5 %      Barometric Pressure for Blood Gas 748.3 mmHg      Modality Cannula     Set [facility] Resp Rate 18    POC Glucose Once [[id number]]  (Abnormal) Collected:  12/31/18 0642    Specimen:  Blood Updated:  12/31/18 0644     Glucose 384 mg/dL     Lipid Panel [[id number]]  (Abnormal) Collected:  12/31/18 0208    Specimen:  Blood Updated:  12/31/18 0435     Total Cholesterol 195 mg/dL      Triglycerides 1,155 mg/dL      HDL Cholesterol 24 mg/dL      LDL Cholesterol  -- mg/dL      Comment: Unable to calculate        VLDL Cholesterol -- mg/dL      Comment:  Unable to calculate        LDL/HDL Ratio --     Comment: Unable to calculate       Narrative:       Cholesterol Reference Ranges  (U.S. Department of Health and Human Services ATP III Classifications)    Desirable          <200 mg/dL  Borderline High    200-239 mg/dL  High Risk          >240 mg/dL      Triglyceride Reference Ranges  (U.S. Department of Health and Human Services ATP III Classifications)    Normal           <150 mg/dL  Borderline High  150-199 mg/dL  High             200-499 mg/dL  Very High        >500 mg/dL    HDL Reference Ranges  (U.S. Department of Health and Human Services ATP III Classifcations)    Low     <40 mg/dl (major risk factor for CHD)  High    >60 mg/dl ('negative' risk factor for CHD)        LDL Reference Ranges  (U.S. Department of Health and Human Services ATP III Classifcations)    Optimal          <100 mg/dL  Near Optimal     100-129 mg/dL  Borderline High  130-159 mg/dL  High             160-189 mg/dL  Very High        >189 mg/dL    LDL Cholesterol, Direct [[id number]]  (Normal) Collected:  12/31/18 0208    Specimen:  Blood Updated:  12/31/18 0353     LDL Cholesterol  73 mg/dL     Narrative:       LDL Reference Ranges    (U.S. Department of Health and Human Services ATP III Classifications)    Optimal          <100 mg/dl  Near Optimal     100-129 mg/dl  Borderline High  130-159 mg/dl  High             160-189 mg/dl  Very High        >189 mg/dl    Bilirubin, Total & Direct [[id number]] Collected:  12/31/18 0208    Specimen:  Blood Updated:  12/31/18 0337     Total Bilirubin 0.3 mg/dL      Bilirubin, Direct <0.2 mg/dL      Comment: Specimen hemolyzed. Results may be affected.        Bilirubin, Indirect -- mg/dL      Comment: Unable to calculate       Rheumatoid Factor [[id number]]  (Normal) Collected:  12/31/18 0208    Specimen:  Blood Updated:  12/31/18 0337     Rheumatoid Factor Quantitative <10.0 IU/mL     Troponin [[id number]]  (Normal) Collected:  12/31/18 0208    Specimen:  Blood  Updated:  12/31/18 0333     Troponin T <0.010 ng/mL     Narrative:       Troponin T Reference Ranges:  Less than 0.03 ng/mL:    Negative for AMI  0.03 to 0.09 ng/mL:      Indeterminant for AMI  Greater than 0.09 ng/mL: Positive for AMI    T4, Free [[id number]]  (Normal) Collected:  12/31/18 0208    Specimen:  Blood Updated:  12/31/18 0333     Free T4 1.16 ng/dL     TSH [[id number]]  (Abnormal) Collected:  12/31/18 0208    Specimen:  Blood Updated:  12/31/18 0333     TSH 4.740 mIU/mL     Sedimentation Rate [[id number]]  (Normal) Collected:  12/31/18 0208    Specimen:  Blood Updated:  12/31/18 0323     Sed Rate 14 mm/hr     Comprehensive Metabolic Panel [[id number]]  (Abnormal) Collected:  12/31/18 0208    Specimen:  Blood Updated:  12/31/18 0323     Glucose 396 mg/dL      BUN 17 mg/dL      Creatinine 0.78 mg/dL      Sodium 130 mmol/L      Potassium 3.9 mmol/L      Chloride 91 mmol/L      CO2 23.8 mmol/L      Calcium 8.7 mg/dL      Total Protein 6.8 g/dL      Albumin 3.40 g/dL      ALT (SGPT) 17 U/L      AST (SGOT) 14 U/L      Comment: Specimen hemolyzed.  Results may be affected.        Alkaline Phosphatase 95 U/L      Total Bilirubin 0.2 mg/dL      eGFR Non African Amer 107 mL/min/1.73      Globulin 3.4 gm/dL      A/G Ratio 1.0 g/dL      BUN/Creatinine Ratio 21.8     Anion Gap 15.2 mmol/L     Uric Acid [[id number]]  (Normal) Collected:  12/31/18 0208    Specimen:  Blood Updated:  12/31/18 0323     Uric Acid 4.7 mg/dL     Magnesium [[id number]]  (Normal) Collected:  12/31/18 0208    Specimen:  Blood Updated:  12/31/18 0323     Magnesium 2.0 mg/dL     Gamma GT [[id number]]  (Normal) Collected:  12/31/18 0208    Specimen:  Blood Updated:  12/31/18 0322     GGT 58 U/L     Lipase [[id number]]  (Normal) Collected:  12/31/18 0208    Specimen:  Blood Updated:  12/31/18 0322     Lipase 27 U/L     C-reactive Protein [[id number]]  (Abnormal) Collected:  12/31/18 0208    Specimen:  Blood Updated:  12/31/18 0322     C-Reactive Protein  1.25 mg/dL     High Sensitivity CRP [[id number]]  (Abnormal) Collected:  12/31/18 0208    Specimen:  Blood Updated:  12/31/18 0322     C-Reactive Protein 0.967 mg/dL     Amylase [[id number]]  (Abnormal) Collected:  12/31/18 0208    Specimen:  Blood Updated:  12/31/18 0322     Amylase 22 U/L     CK [[id number]]  (Normal) Collected:  12/31/18 0208    Specimen:  Blood Updated:  12/31/18 0322     Creatine Kinase 43 U/L     BNP [[id number]]  (Normal) Collected:  12/31/18 0208    Specimen:  Blood Updated:  12/31/18 0321     proBNP 36.2 pg/mL     Narrative:       Among patients with dyspnea, NT-proBNP is highly sensitive for the detection of acute congestive heart failure. In addition NT-proBNP of <300 pg/ml effectively rules out acute congestive heart failure with 99% negative predictive value.    Hemoglobin A1c [[id number]]  (Abnormal) Collected:  12/31/18 0214    Specimen:  Blood Updated:  12/31/18 0317     Hemoglobin A1C 15.70 %     Narrative:       Hemoglobin A1C Ranges:    Increased Risk for Diabetes  5.7% to 6.4%  Diabetes                     >= 6.5%  Diabetic Goal                < 7.0%    Lactic Acid, Reflex [[id number]]  (Normal) Collected:  12/31/18 0208    Specimen:  Blood Updated:  12/31/18 0316     Lactate 1.3 mmol/L     CBC & Differential [[id number]] Collected:  12/31/18 0208    Specimen:  Blood Updated:  12/31/18 0300    Narrative:       The following orders were created for panel order CBC & Differential.  Procedure                               Abnormality         Status                     ---------                               -----------         ------                     CBC Auto Differential[[id number]]        Abnormal            Final result                 Please view results for these tests on the individual orders.    CBC Auto Differential [[id number]]  (Abnormal) Collected:  12/31/18 0208    Specimen:  Blood Updated:  12/31/18 0300     WBC 19.50 10*3/mm3      RBC 4.97 10*6/mm3      Hemoglobin 15.1 g/dL       Hematocrit 44.0 %      MCV 88.5 fL      MCH 30.4 pg      MCHC 34.3 g/dL      RDW 12.8 %      RDW-SD 40.6 fl      MPV 11.2 fL      Platelets 220 10*3/mm3      Neutrophil % 58.1 %      Lymphocyte % 32.2 %      Monocyte % 8.4 %      Eosinophil % 0.7 %      Basophil % 0.2 %      Immature Grans % 0.4 %      Neutrophils, Absolute 11.36 10*3/mm3      Lymphocytes, Absolute 6.27 10*3/mm3      Monocytes, Absolute 1.63 10*3/mm3      Eosinophils, Absolute 0.14 10*3/mm3      Basophils, Absolute 0.03 10*3/mm3      Immature Grans, Absolute 0.07 10*3/mm3     Insulin, Random [[id number]] Collected:  12/31/18 0208    Specimen:  Blood Updated:  12/31/18 0252    AUSTEN [[id number]] Collected:  12/31/18 0208    Specimen:  Blood Updated:  12/31/18 0251    C-Peptide [[id number]] Collected:  12/31/18 0208    Specimen:  Blood Updated:  12/31/18 0251    Thyroglobulin [[id number]] Collected:  12/31/18 0208    Specimen:  Blood Updated:  12/31/18 0251    Lactic Acid, Reflex Timer (This will reflex a repeat order 3-3:15 hours after ordered.) [[id number]] Collected:  12/30/18 1957    Specimen:  Blood Updated:  12/30/18 2330     Extra Tube Hold for add-ons.     Comment: Auto resulted.       POC Glucose Once [[id number]]  (Abnormal) Collected:  12/30/18 2105    Specimen:  Blood Updated:  12/30/18 2107     Glucose 483 mg/dL     Lactic Acid, Plasma [[id number]]  (Abnormal) Collected:  12/30/18 1957    Specimen:  Blood Updated:  12/30/18 2020     Lactate 4.2 mmol/L     Phosphorus [[id number]]  (Abnormal) Collected:  12/30/18 1821    Specimen:  Blood Updated:  12/30/18 1953     Phosphorus 4.6 mg/dL     Magnesium [[id number]]  (Normal) Collected:  12/30/18 1821    Specimen:  Blood Updated:  12/30/18 1953     Magnesium 2.0 mg/dL     [place] Draw [[id number]] Collected:  12/30/18 1821    Specimen:  Blood Updated:  12/30/18 1930    Narrative:       The following orders were created for panel order [place] Draw.  Procedure                               Abnormality          Status                     ---------                               -----------         ------                     Light Blue Top[[id number]]                                   Final result               Green Top (Gel)[[id number]]                                  Final result               Lavender Top[[id number]]                                     Final result               Gold Top - SST[[id number]]                                   Final result                 Please view results for these tests on the individual orders.    Green Top (Gel) [[id number]] Collected:  12/30/18 1821    Specimen:  Blood Updated:  12/30/18 1930     Extra Tube Hold for add-ons.     Comment: Auto resulted.       Lavender Top [[id number]] Collected:  12/30/18 1821    Specimen:  Blood Updated:  12/30/18 1930     Extra Tube hold for add-on     Comment: Auto resulted       Light Blue Top [[id number]] Collected:  12/30/18 1821    Specimen:  Blood Updated:  12/30/18 1930     Extra Tube hold for add-on     Comment: Auto resulted       Gold Top - SST [[id number]] Collected:  12/30/18 1821    Specimen:  Blood Updated:  12/30/18 1930     Extra Tube Hold for add-ons.     Comment: Auto resulted.       Comprehensive Metabolic Panel [[id number]]  (Abnormal) Collected:  12/30/18 1821    Specimen:  Blood Updated:  12/30/18 1918     Glucose 747 mg/dL      BUN 25 mg/dL      Creatinine 1.28 mg/dL      Sodium 124 mmol/L      Potassium 5.3 mmol/L      Chloride 83 mmol/L      CO2 22.4 mmol/L      Calcium 10.1 mg/dL      Total Protein 7.8 g/dL      Albumin 4.00 g/dL      ALT (SGPT) 21 U/L      AST (SGOT) 15 U/L      Alkaline Phosphatase 113 U/L      Total Bilirubin 0.4 mg/dL      eGFR Non African Amer 61 mL/min/1.73      Globulin 3.8 gm/dL      A/G Ratio 1.1 g/dL      BUN/Creatinine Ratio 19.5     Anion Gap 18.6 mmol/L     Ketone Bodies, Serum (Not performed at [place]) [[id number]] Collected:  12/30/18 1821    Specimen:  Blood Updated:  12/30/18 1909     Narrative:       The following orders were created for panel order Ketone Bodies, Serum (Not performed at [place]).  Procedure                               Abnormality         Status                     ---------                               -----------         ------                     Beta Hydroxybutyrate Richard...[[id number]]  Abnormal            Final result                 Please view results for these tests on the individual orders.    Beta Hydroxybutyrate Quantitative [[id number]]  (Abnormal) Collected:  12/30/18 1821    Specimen:  Blood Updated:  12/30/18 1909     Beta-Hydroxybutyrate Quant 0.528 mmol/L     Urinalysis With Microscopic If Indicated (No Culture) - Urine, Clean Catch [[id number]]  (Abnormal) Collected:  12/30/18 1843    Specimen:  Urine, Clean Catch Updated:  12/30/18 1900     Color, UA Yellow     Appearance, UA Clear     pH, UA 5.5     Specific Gravity, UA >=1.030     Glucose, UA >=1000 mg/dL (3+)     Ketones, UA Trace     Bilirubin, UA Negative     Blood, UA Negative     Protein, UA Negative     Leuk Esterase, UA Negative     Nitrite, UA Negative     Urobilinogen, UA 0.2 E.U./dL    Narrative:       Urine microscopic not indicated.    CBC & Differential [[id number]] Collected:  12/30/18 1821    Specimen:  Blood Updated:  12/30/18 1858    Narrative:       The following orders were created for panel order CBC & Differential.  Procedure                               Abnormality         Status                     ---------                               -----------         ------                     Scan Slide[[id number]]                                                                  CBC Auto Differential[[id number]]        Abnormal            Final result                 Please view results for these tests on the individual orders.    CBC Auto Differential [[id number]]  (Abnormal) Collected:  12/30/18 1821    Specimen:  Blood Updated:  12/30/18 1858     WBC 16.01 10*3/mm3      RBC 5.59 10*6/mm3       Hemoglobin 16.8 g/dL      Hematocrit 46.6 %      MCV 83.4 fL      MCH 30.1 pg      MCHC 36.1 g/dL      RDW 12.6 %      RDW-SD 38.0 fl      MPV 12.0 fL      Platelets 236 10*3/mm3      Neutrophil % 63.6 %      Lymphocyte % 26.0 %      Monocyte % 10.1 %      Eosinophil % 0.2 %      Basophil % 0.1 %      Immature Grans % 0.6 %      Neutrophils, Absolute 10.18 10*3/mm3      Lymphocytes, Absolute 4.16 10*3/mm3      Monocytes, Absolute 1.61 10*3/mm3      Eosinophils, Absolute 0.04 10*3/mm3      Basophils, Absolute 0.02 10*3/mm3      Immature Grans, Absolute 0.09 10*3/mm3           Imaging Results (last 72 hours)     Procedure Component Value Units Date/Time    MRI Brain With & Without Contrast [[id number]] Collected:  01/01/19 0842     Updated:  01/01/19 0852    Narrative:       MRI BRAIN W WO CONTRAST-     INDICATIONS: Disequilibrium     TECHNIQUE: Multiplanar multisequence magnetic resonance imaging of the  brain, without and with intravenous contrast material.     COMPARISON:  1/26/2017. Correlated with facial CT from 12/30/2018     FINDINGS:     The diffusion-weighted images show no restricted diffusion to suggest  acute infarct.     The midline structures appear unremarkable.     The brain parenchyma shows normal signal intensity. No enhancing lesions  of brain. Vascular flow related artifact is seen on the postcontrast  images     Flow voids in the major arteries at the base of the brain appear  unremarkable.     Small likely mucous retention cyst or polyp in the right maxillary  sinus. The paranasal sinuses, mastoid air cells, and orbits appear  otherwise unremarkable.     At the previously CT-demonstrated abscess of the left upper lip,  marginally enhancing fluid collection with surrounding edema is also  noted on this exam, without obvious change.       Impression:          1. No acute infarct. No enhancing lesions of brain. No conspicuous white  matter disease. Follow-up as indications persist  2. Redemonstration  of abscess of the left upper lip        This report was finalized on 1/1/2019 8:49 AM by Dr. Hima Ivory M.D.       CT Guided Biopsy Aspiration Injection [[id number]] Collected:  12/31/18 1648     Updated:  12/31/18 1655    Narrative:       CT GUIDED LEFT LIP COLLECTION ASPIRATION     HISTORY: Left lip abscess     Radiation dose reduction techniques were utilized, including automated  exposure control and exposure modulation based on body size.     Procedure explained to the patient with potential complications. After  signed informed consent was obtained the patient was placed in the  supine position on the CT table. . After appropriate prep and drape of  the skin surface with Betadine, 1% lidocaine for local anesthesia. An  18-gauge needle was advanced into the fluid collection and only 0.5 cc  of mixed thick, bloody and yellow fluid was able to be aspirated.     CONCLUSION: Successful left ligament fluid collection aspiration using  CT guidance. No complications encountered.     This report was finalized on 12/31/2018 4:50 PM by Dr. Kiko Edwards M.D.       CT Facial Bones With Contrast [[id number]] Collected:  12/30/18 2116     Updated:  12/30/18 2126    Narrative:       CT OF THE FACIAL BONES WITH CONTRAST     HISTORY: Left-sided facial swelling     COMPARISON: None available.     TECHNIQUE: Axial CT imaging was obtained from the facial bones following  administration of IV contrast. Coronal and sagittal reformatted images  were obtained.        FINDINGS:  Visualized portions of the brain parenchyma appear unremarkable. Orbits  appear unremarkable. There is a mucous retention cyst seen within the  right maxillary sinus. The remainder of the visualized paranasal sinuses  and mastoid air cells appear clear. There is extensive soft tissue  swelling which is noted overlying the left upper lip. It is associated  with a rim-enhancing collection, characteristic of abscess. The  collection measures at least 2.5  x 1.5 cm. No extension into the oral  cavity is identified. No aggressive osseous abnormalities are seen. The  nasopharynx and oropharynx, and hypopharynx all appear unremarkable. The  patient does have some enlarged cervical lymph nodes on the left. For  example, one node inferior to the left mandible measures up to 2.4 x 1.0  cm. These are likely reactive, given the adjacent inflammation. No  abnormality is seen on the right.          Impression:          1. Extensive soft tissue swelling seen overlying the left upper lip,  characteristic of cellulitis. In addition, there is a rim-enhancing  collection, which measures at least 2.5 x 1.5 cm, characteristic of  abscess. I do not see any extension into the oral cavity, and no  aggressive osseous abnormalities are seen. Enlarged cervical lymph nodes  are noted on the left, likely reactive.     Radiation dose reduction techniques were utilized, including automated  exposure control and exposure modulation based on body size.     This report was finalized on 12/30/2018 9:23 PM by Dr. Taylor Sarabia M.D.               Medication Review:     Hospital Medications (active)       Dose Frequency Start End    acetaminophen (TYLENOL) tablet 325 mg 325 mg Every 4 Hours PRN 12/30/2018     Sig - Route: Take 1 tablet by mouth Every 4 (Four) Hours As Needed for Mild Pain . - Oral    acetaminophen (TYLENOL) tablet 650 mg 650 mg Every 4 Hours PRN 12/30/2018     Sig - Route: Take 2 tablets by mouth Every 4 (Four) Hours As Needed for Mild Pain . - Oral    atorvastatin (LIPITOR) tablet 20 mg 20 mg Daily 12/31/2018     Sig - Route: Take 1 tablet by mouth Daily. - Oral    cholecalciferol (VITAMIN D3) tablet 5,000 Units 5,000 Units Daily 12/31/2018     Sig - Route: Take 5 tablets by mouth Daily. - Oral    Dapagliflozin Propanediol tablet 10 mg 10 mg Daily 12/31/2018     Sig - Route: Take 10 mg by mouth Daily. - Oral    enoxaparin (LOVENOX) syringe 40 mg 40 mg Every 12 Hours 12/30/2018      Sig - Route: Inject 0.4 mL under the skin into the appropriate area as directed Every 12 (Twelve) Hours. - Subcutaneous    exenatide (BYETTA) injection 10 mcg 10 mcg Every 12 Hours Scheduled 12/30/2018     Sig - Route: Inject 0.04 mL under the skin into the appropriate area as directed Every 12 (Twelve) Hours. - Subcutaneous    Notes to Pharmacy: Patient is non-compliant at home, and does not have own supply, per conversation with RN in ED.    fenofibrate (TRICOR) tablet 145 mg 145 mg Daily 12/31/2018     Sig - Route: Take 1 tablet by mouth Daily. - Oral    gadobenate dimeglumine (MULTIHANCE) injection 20 mL 20 mL Once in Imaging 1/1/2019 1/1/2019    Sig - Route: Infuse 20 mL into a venous catheter Once. - Intravenous    HYDROcodone-acetaminophen (NORCO) 7.5-325 MG per tablet 1 tablet 1 tablet 4 Times Daily PRN 1/1/2019 1/11/2019    Sig - Route: Take 1 tablet by mouth 4 (Four) Times a Day As Needed for Moderate Pain . - Oral    HYDROmorphone (DILAUDID) injection 1 mg 1 mg Every 4 Hours PRN 12/30/2018 1/9/2019    Sig - Route: Infuse 1 mL into a venous catheter Every 4 (Four) Hours As Needed for Severe Pain . - Intravenous    Influenza Vac Subunit Quad (FLUCELVAX) injection 0.5 mL 0.5 mL Once 1/1/2019 1/1/2019    Sig - Route: Inject 0.5 mL into the appropriate muscle as directed by prescriber 1 (One) Time. - Intramuscular    insulin lispro (humaLOG) injection 1-20 Units 1-20 Units 3 Times Daily With Meals 12/31/2018     Sig - Route: Inject 1-20 Units under the skin into the appropriate area as directed 3 (Three) Times a Day With Meals. - Subcutaneous    linagliptin (TRADJENTA) tablet 5 mg 5 mg Daily 12/31/2018     Sig - Route: Take 1 tablet by mouth Daily. - Oral    magnesium sulfate 2g/50 mL (PREMIX) infusion 2 g As Needed 12/30/2018 1/6/2019    Sig - Route: Infuse 50 mL into a venous catheter As Needed (to be given for Magnesium levels 1.8 or less PRN.). - Intravenous    metFORMIN (GLUCOPHAGE) tablet 1,000 mg  "1,000 mg 2 Times Daily With Meals 1/2/2019     Sig - Route: Take 1 tablet by mouth 2 (Two) Times a Day With Meals. - Oral    Cosign for Ordering: Accepted by Reyes, Rosenberg Acosta, MD on 12/31/2018 11:03 AM    nebivolol (BYSTOLIC) tablet 5 mg 5 mg Daily 12/31/2018     Sig - Route: Take 1 tablet by mouth Daily. - Oral    nicotine (NICODERM CQ) 14 MG/24HR patch 1 patch 1 patch Every 24 Hours Scheduled 12/31/2018     Sig - Route: Place 1 patch on the skin as directed by provider Daily. - Transdermal    NIFEdipine XL (PROCARDIA XL) 24 hr tablet 90 mg 90 mg Daily 12/31/2018     Sig - Route: Take 90 mg by mouth Daily. - Oral    olmesartan (BENICAR) tablet 40 mg 40 mg Daily 12/31/2018     Sig - Route: Take 1 tablet by mouth Daily. - Oral    pantoprazole (PROTONIX) EC tablet 40 mg 40 mg Every Morning 12/31/2018     Sig - Route: Take 1 tablet by mouth Every Morning. - Oral    perflutren (DEFINITY) 8.476 mg in sodium chloride 0.9 % 10 mL injection 3 mL Once 1/1/2019 1/1/2019    Sig - Route: Infuse 3 mL into a venous catheter 1 (One) Time. - Intravenous    Pharmacy to dose vancomycin  Continuous PRN 12/30/2018 1/20/2019    Sig - Route: Continuous As Needed for Consult. - Does not apply    pioglitazone (ACTOS) tablet 30 mg 30 mg Daily 12/31/2018     Sig - Route: Take 1 tablet by mouth Daily. - Oral    piperacillin-tazobactam (ZOSYN) 4.5 g in iso-osmotic dextrose 100 mL IVPB (premix) 4.5 g Once 1/1/2019     Sig - Route: Infuse 100 mL into a venous catheter 1 (One) Time. - Intravenous    piperacillin-tazobactam (ZOSYN) 4.5 g in iso-osmotic dextrose 100 mL IVPB (premix) 4.5 g Every 8 Hours 1/1/2019 1/8/2019    Sig - Route: Infuse 100 mL into a venous catheter Every 8 (Eight) Hours. - Intravenous    potassium chloride (KLOR-CON) packet 40 mEq 40 mEq As Needed 12/30/2018     Sig - Route: Take 40 mEq by mouth As Needed (potassium replacement, see admin instructions). - Oral    Linked Group 1:  \"Or\" Linked Group Details        " "potassium chloride (MICRO-K) CR capsule 40 mEq 40 mEq As Needed 12/30/2018     Sig - Route: Take 4 capsules by mouth As Needed (potassium replacement.  see admin instructions). - Oral    Linked Group 1:  \"Or\" Linked Group Details        potassium chloride 10 mEq in 100 mL IVPB 10 mEq Every 1 Hour PRN 12/30/2018     Sig - Route: Infuse 100 mL into a venous catheter Every 1 (One) Hour As Needed (potassium protocol PERIPHERAL - see admin instructions). - Intravenous    Linked Group 1:  \"Or\" Linked Group Details        sodium chloride (HYPERTONIC) 3 % infusion 60 mL/hr Once 12/31/2018     Sig - Route: Infuse 60 mL/hr into a venous catheter 1 (One) Time. - Intravenous    sodium chloride 0.9 % flush 1-10 mL 1-10 mL As Needed 12/31/2018     Sig - Route: Infuse 1-10 mL into a venous catheter As Needed for Line Care. - Intravenous    sodium chloride 0.9 % flush 10 mL 10 mL Every 12 Hours Scheduled 12/31/2018     Sig - Route: Infuse 10 mL into a venous catheter Every 12 (Twelve) Hours. - Intravenous    sodium chloride 0.9 % flush 10 mL 10 mL Every 12 Hours Scheduled 12/31/2018     Sig - Route: Infuse 10 mL into a venous catheter Every 12 (Twelve) Hours. - Intravenous    sodium chloride 0.9 % flush 10 mL 10 mL As Needed 12/31/2018     Sig - Route: Infuse 10 mL into a venous catheter As Needed for Line Care (After Medication Administration). - Intravenous    sodium chloride 0.9 % flush 20 mL 20 mL As Needed 12/31/2018     Sig - Route: Infuse 20 mL into a venous catheter As Needed for Line Care (After Blood Draws or Blood Product Administration). - Intravenous    sodium chloride 0.9 % flush 3 mL 3 mL Every 12 Hours Scheduled 12/30/2018     Sig - Route: Infuse 3 mL into a venous catheter Every 12 (Twelve) Hours. - Intravenous    sodium chloride 0.9 % flush 3 mL 3 mL Every 12 Hours Scheduled 12/31/2018     Sig - Route: Infuse 3 mL into a venous catheter Every 12 (Twelve) Hours. - Intravenous    sodium chloride 0.9 % flush 3-10 mL " 3-10 mL As Needed 12/30/2018     Sig - Route: Infuse 3-10 mL into a venous catheter As Needed for Line Care. - Intravenous    vancomycin 2000 mg/500 mL 0.9% NS IVPB ([facility]) 2,000 mg Every 8 Hours 1/1/2019 1/21/2019    Sig - Route: Infuse 500 mL into a venous catheter Every 8 (Eight) Hours. - Intravenous    zolpidem (AMBIEN) tablet 5 mg 5 mg Nightly PRN 12/30/2018 1/9/2019    Sig - Route: Take 1 tablet by mouth At Night As Needed for Sleep. - Oral    vancomycin 1500 mg/500 mL 0.9% NS IVPB ([facility]) (Discontinued) 10 mg/kg × 139 kg Every 8 Hours 12/31/2018 12/31/2018    Sig - Route: Infuse 500 mL into a venous catheter Every 8 (Eight) Hours. - Intravenous    Reason for Discontinue: Dose adjustment          amoxicillin-clavulanate 1 tablet Oral Q12H   atorvastatin 20 mg Oral Daily   cholecalciferol 5,000 Units Oral Daily   Dapagliflozin Propanediol 10 mg Oral Daily   enoxaparin 40 mg Subcutaneous Q12H   exenatide 10 mcg Subcutaneous Q12H   fenofibrate 145 mg Oral Daily   fluconazole 100 mg Oral Q24H   insulin glargine 32 Units Subcutaneous Q12H   insulin lispro 1-20 Units Subcutaneous TID With Meals   linagliptin 5 mg Oral Daily   metFORMIN 1,000 mg Oral BID With Meals   nebivolol 5 mg Oral Daily   NIFEdipine XL 90 mg Oral Daily   olmesartan 40 mg Oral Daily   pantoprazole 40 mg Oral QAM   pioglitazone 30 mg Oral Daily   sodium chloride 60 mL/hr Intravenous Once   sodium chloride 10 mL Intravenous Q12H   sodium chloride 10 mL Intravenous Q12H   sodium chloride 3 mL Intravenous Q12H   sodium chloride 3 mL Intravenous Q12H       Assessment/Plan         * No active hospital problems. *      C&S  Strep B      Plan :      Home on 10 days of PO Augmentin  875 mg BID    Dinesh Carter MD  01/03/19  1:16 PM

## 2019-01-03 NOTE — PLAN OF CARE
Problem: Patient Care Overview  Goal: Plan of Care Review  Outcome: Ongoing (interventions implemented as appropriate)   01/03/19 0602   Coping/Psychosocial   Plan of Care Reviewed With patient   Plan of Care Review   Progress improving   OTHER   Outcome Summary Pain meds reordered and hot compress applied to face, IV anitbiotics switching to PO and possible D/C 1/3 or 1/4, patient wants to return to work, left lip and cheek swollen, ASP Biopsy taken 1/2, up ad zackery, VSS, will CTM        Problem: Pain, Acute (Adult)  Goal: Acceptable Pain Control/Comfort Level  Outcome: Ongoing (interventions implemented as appropriate)      Problem: Fall Risk (Adult)  Goal: Absence of Fall  Outcome: Ongoing (interventions implemented as appropriate)      Problem: Skin Injury Risk (Adult)  Goal: Skin Health and Integrity  Outcome: Ongoing (interventions implemented as appropriate)      Problem: Infection, Risk/Actual (Adult)  Goal: Infection Prevention/Resolution  Outcome: Ongoing (interventions implemented as appropriate)      Problem: Diabetes, Type 2 (Adult)  Goal: Signs and Symptoms of Listed Potential Problems Will be Absent, Minimized or Managed (Diabetes, Type 2)  Outcome: Ongoing (interventions implemented as appropriate)

## 2019-01-03 NOTE — PROGRESS NOTES
Today patient is more in control his behavior and is to be discharged today with oral Augmentin 875 mg every 12 hours ×10 days.  Small amount of devitalized debris in his mouth and this was trimmed at bedside without difficulty.  No active bleeding was identified.    Plan: I agree with discharge today and have asked that he continue his warm soaks for 5 minutes on a when necessary basis and to consider sugar-free mouthwash on a 3 times a day basis. He is to make appointment to see me on Tuesday, January 15, 2019.  He is to call the office to make that appointment.    DANIEL

## 2019-01-04 ENCOUNTER — READMISSION MANAGEMENT (OUTPATIENT)
Dept: CALL CENTER | Facility: HOSPITAL | Age: 49
End: 2019-01-04

## 2019-01-04 LAB
BACTERIA SPEC AEROBE CULT: NORMAL
BACTERIA SPEC AEROBE CULT: NORMAL

## 2019-01-04 NOTE — PROGRESS NOTES
Case Management Discharge Note    Final Note: Pt was dc' home     Destination      No service has been selected for the patient.      Durable Medical Equipment      No service has been selected for the patient.      Dialysis/Infusion      No service has been selected for the patient.      Home Medical Care      No service has been selected for the patient.      Community Resources      No service has been selected for the patient.        Other: Other    Final Discharge Disposition Code: 01 - home or self-care

## 2019-01-05 NOTE — OUTREACH NOTE
Prep Survey      Responses   Facility patient discharged from?  [facility]   Is patient eligible?  Yes   Discharge diagnosis  cellulitis of left upper lip with abscess, sepsis, T2DM   Does the patient have one of the following disease processes/diagnoses(primary or secondary)?  Sepsis   Does the patient have Home health ordered?  No   Is there a DME ordered?  No   Prep survey completed?  Yes          Maeve Vyas RN

## 2019-01-07 ENCOUNTER — READMISSION MANAGEMENT (OUTPATIENT)
Dept: CALL CENTER | Facility: HOSPITAL | Age: 49
End: 2019-01-07

## 2019-01-07 LAB — THYROGLOBULIN (TG-RIA): 5.7 NG/ML

## 2019-01-07 NOTE — OUTREACH NOTE
Sepsis Week 1 Survey      Responses   Facility patient discharged from?  [facility]   Does the patient have one of the following disease processes/diagnoses(primary or secondary)?  Sepsis   Is there a successful TCM telephone encounter documented?  Yes          Brenda Tiwari RN

## 2019-01-11 ENCOUNTER — READMISSION MANAGEMENT (OUTPATIENT)
Dept: CALL CENTER | Facility: HOSPITAL | Age: 49
End: 2019-01-11

## 2019-01-11 NOTE — OUTREACH NOTE
Sepsis Week 2 Survey      Responses   Facility patient discharged from?  [facility]   Does the patient have one of the following disease processes/diagnoses(primary or secondary)?  Sepsis   Week 2 attempt successful?  No   Unsuccessful attempts  Attempt 1          Brenda Tiwari RN

## 2019-01-14 ENCOUNTER — READMISSION MANAGEMENT (OUTPATIENT)
Dept: CALL CENTER | Facility: HOSPITAL | Age: 49
End: 2019-01-14

## 2019-01-14 NOTE — OUTREACH NOTE
Sepsis Week 2 Survey      Responses   Facility patient discharged from?  [facility]   Does the patient have one of the following disease processes/diagnoses(primary or secondary)?  Sepsis   Week 2 attempt successful?  No   Unsuccessful attempts  Attempt 2          Elie Neves RN

## 2019-01-17 ENCOUNTER — READMISSION MANAGEMENT (OUTPATIENT)
Dept: CALL CENTER | Facility: HOSPITAL | Age: 49
End: 2019-01-17

## 2019-01-17 NOTE — OUTREACH NOTE
Sepsis Week 3 Survey      Responses   Facility patient discharged from?  [facility]   Does the patient have one of the following disease processes/diagnoses(primary or secondary)?  Sepsis   Week 3 attempt successful?  No   Unsuccessful attempts  Attempt 1          Micki Tran RN

## 2020-10-13 ENCOUNTER — HOSPITAL ENCOUNTER (EMERGENCY)
Facility: HOSPITAL | Age: 50
Discharge: HOME | End: 2020-10-13
Attending: EMERGENCY MEDICINE | Admitting: EMERGENCY MEDICINE

## 2020-10-13 VITALS
HEART RATE: 95 BPM | DIASTOLIC BLOOD PRESSURE: 72 MMHG | WEIGHT: 273 LBS | BODY MASS INDEX: 36.18 KG/M2 | HEIGHT: 73 IN | RESPIRATION RATE: 16 BRPM | SYSTOLIC BLOOD PRESSURE: 127 MMHG | OXYGEN SATURATION: 98 % | TEMPERATURE: 97.1 F

## 2020-10-13 DIAGNOSIS — L02.213 CHEST WALL ABSCESS: Primary | ICD-10-CM

## 2020-10-13 PROCEDURE — 87186 SC STD MICRODIL/AGAR DIL: CPT | Performed by: EMERGENCY MEDICINE

## 2020-10-13 PROCEDURE — 87147 CULTURE TYPE IMMUNOLOGIC: CPT | Performed by: EMERGENCY MEDICINE

## 2020-10-13 PROCEDURE — 99283 EMERGENCY DEPT VISIT LOW MDM: CPT

## 2020-10-13 PROCEDURE — 87070 CULTURE OTHR SPECIMN AEROBIC: CPT | Performed by: EMERGENCY MEDICINE

## 2020-10-13 PROCEDURE — 87205 SMEAR GRAM STAIN: CPT | Performed by: EMERGENCY MEDICINE

## 2020-10-13 RX ORDER — LIDOCAINE HYDROCHLORIDE AND EPINEPHRINE 10; 10 MG/ML; UG/ML
10 INJECTION, SOLUTION INFILTRATION; PERINEURAL ONCE
Status: COMPLETED | OUTPATIENT
Start: 2020-10-13 | End: 2020-10-13

## 2020-10-13 RX ORDER — SULFAMETHOXAZOLE AND TRIMETHOPRIM 800; 160 MG/1; MG/1
1 TABLET ORAL 2 TIMES DAILY
Qty: 10 TABLET | Refills: 0 | Status: SHIPPED | OUTPATIENT
Start: 2020-10-13 | End: 2020-11-27 | Stop reason: SDUPTHER

## 2020-10-13 RX ADMIN — LIDOCAINE HYDROCHLORIDE,EPINEPHRINE BITARTRATE 10 ML: 10; .01 INJECTION, SOLUTION INFILTRATION; PERINEURAL at 13:15

## 2020-10-13 NOTE — ED PROVIDER NOTES
Incision & Drainage    Date/Time: 10/13/2020 1:35 PM  Performed by: Werner Lechuga PA  Authorized by: Cecilio Hutchinson MD     Consent:     Consent obtained:  Verbal    Consent given by:  Patient  Location:     Type:  Abscess    Location:  Trunk    Trunk location:  Chest  Pre-procedure details:     Skin preparation:  Hibiclens  Anesthesia (see MAR for exact dosages):     Anesthesia method:  Local infiltration    Local anesthetic:  Lidocaine 1% WITH epi  Procedure type:     Complexity:  Complex  Procedure details:     Needle aspiration: no      Incision types:  Cruciate    Incision depth:  Subcutaneous    Scalpel blade:  11    Wound management:  Irrigated with saline and probed and deloculated    Drainage:  Purulent    Drainage amount:  Moderate    Packing materials:  1/4 in iodoform gauze  Post-procedure details:     Patient tolerance of procedure:  Tolerated well, no immediate complications           Werner Lechuga PA  10/13/20 133

## 2020-10-13 NOTE — ED PROVIDER NOTES
EMERGENCY DEPARTMENT ENCOUNTER    Room Number:  15/15  PCP: Reyes, Rosenberg Acosta, MD  Historian: Patient  History Limited By: Nothing      HPI  Chief Complaint: Abscess  Context: Graham Meléndez is a 50 y.o. male who presents to the ED c/o abscess.  Patient states he was working in the heat and had multiple small areas of tenderness on his chest.  One area was bigger than the others become more tender.  Has had abscesses in the past.  No fevers      Location: Right chest wall  Radiation: None  Character: Aching  Duration: 3 days  Severity: Moderate  Progression: Not improving   Aggravating Factors: Nothing  Alleviating Factors: Nothing        MEDICAL RECORD REVIEW    Admitted December 2018 for hyperglycemia          PAST MEDICAL HISTORY  Active Ambulatory Problems     Diagnosis Date Noted   • Type 2 diabetes mellitus with hyperosmolarity, uncontrolled (CMS/HCC) 01/26/2017   • Abscess 04/13/2018     Resolved Ambulatory Problems     Diagnosis Date Noted   • Facial cellulitis 01/25/2017   • Hypoxia 01/26/2017   • Ketonuria 01/26/2017   • Glucosuria 01/26/2017   • Hyponatremia 01/26/2017   • Acute bronchitis 01/26/2017   • Vitamin D deficiency 01/26/2017   • Hypertriglyceridemia 01/26/2017   • Severe hypertension 01/26/2017   • Carbuncle of temple region 01/26/2017   • Cellulitis of left upper lip with abscess formation with sepsis 12/30/2018   • Azotemia 12/31/2018   • Glucosuria 12/31/2018   • Dehydration 12/31/2018   • Hyperkalemia 12/31/2018   • Hyponatremia 12/31/2018   • Hyperglycemia 12/31/2018   • Labile hypertension 12/31/2018   • Uncontrolled type 2 diabetes mellitus with hyperglycemia (CMS/HCC) 12/31/2018     Past Medical History:   Diagnosis Date   • Allergic rhinitis    • Anxiety    • Depression    • Diabetes mellitus (CMS/HCC)    • GERD (gastroesophageal reflux disease)    • Hyperlipidemia    • Hypertension    • Low back pain potentially associated with radiculopathy    • Myofascial pain    • Right  temporal carbuncle    • Sleep apnea          PAST SURGICAL HISTORY  Past Surgical History:   Procedure Laterality Date   • DENTAL PROCEDURE Bilateral 2000    top and bottom tooth extraction-all   • GROIN ABSCESS INCISION AND DRAINAGE Right 2012         FAMILY HISTORY  Family History   Problem Relation Age of Onset   • Hypertension Mother    • ZOEY disease Mother    • Hypertension Father    • Hyperlipidemia Father    • Diabetes Father    • Coronary artery disease Father    • Heart attack Father          SOCIAL HISTORY  Social History     Socioeconomic History   • Marital status: Single     Spouse name: Not on file   • Number of children: 0   • Years of education: 11th grade   • Highest education level: Not on file   Occupational History   • Occupation:    Tobacco Use   • Smoking status: Current Every Day Smoker     Packs/day: 1.00     Years: 10.00     Pack years: 10.00     Start date: 12/31/2008   • Smokeless tobacco: Never Used   Substance and Sexual Activity   • Alcohol use: Yes     Comment: fifth of Joe Geronimo twice a year   • Drug use: No   • Sexual activity: Not Currently     Partners: Female   Social History Narrative    He does eat fair.  He does exercise.  He smokes 1 to 2 packs per day since he was 38 years old and quit at 45 years old.  However, he has started smoking again but now only smokes 1 pack per day.  He drinks a fifth of Joe Crowder twice a year.  He denies illicit drug abuse.  He has had an eleventh grade education.  He works as a .  He remains single.  He lives in an apartment with his parents.  He has no pets.  He has good family support.  He is not sexually active.  He has no child.  There is no dysfunction at home.  There are no weapons, violence, nor abuse at home.         ALLERGIES  Patient has no known allergies.        REVIEW OF SYSTEMS  Review of Systems   Constitutional: Negative for activity change, appetite change and fever.   HENT: Negative for congestion and  sore throat.    Eyes: Negative.    Respiratory: Negative for cough and shortness of breath.    Cardiovascular: Negative for chest pain and leg swelling.   Gastrointestinal: Negative for abdominal pain, diarrhea and vomiting.   Endocrine: Negative.    Genitourinary: Negative for decreased urine volume and dysuria.   Musculoskeletal: Negative for neck pain.   Skin: Positive for wound. Negative for rash.   Allergic/Immunologic: Negative.    Neurological: Negative for weakness, numbness and headaches.   Hematological: Negative.    Psychiatric/Behavioral: Negative.    All other systems reviewed and are negative.           PHYSICAL EXAM  ED Triage Vitals   Temp Heart Rate Resp BP SpO2   10/13/20 1011 10/13/20 1011 10/13/20 1011 10/13/20 1138 10/13/20 1011   97.1 °F (36.2 °C) 112 15 137/88 97 %      Temp src Heart Rate Source Patient Position BP Location FiO2 (%)   10/13/20 1011 10/13/20 1011 -- -- --   Tympanic Monitor          Physical Exam   Constitutional: He is well-developed, well-nourished, and in no distress.   HENT:   Head: Normocephalic and atraumatic.   Eyes: Pupils are equal, round, and reactive to light.   Neck: Normal range of motion.   Cardiovascular: Normal rate and regular rhythm.   Skin: There is erythema.   3 cm abscess right chest wall with some mild fluctuance     Patient was wearing a face mask when I entered the room and they continued to wear a mask throughout their stay in the ED.  I wore PPE, including  gloves, face mask with shield or face mask with goggles whenever I was in the room with patient.       LAB RESULTS  No results found for this or any previous visit (from the past 24 hour(s)).    Ordered the above labs and reviewed the results.        RADIOLOGY  No orders to display            PROCEDURES  Procedures            MEDICATIONS GIVEN IN ER  Medications   lidocaine-EPINEPHrine (XYLOCAINE W/EPI) 1 %-1:601181 injection 10 mL (10 mL Injection Given by Other 10/13/20 1315)             PROGRESS  AND CONSULTS           MEDICAL DECISION MAKING      MDM  Number of Diagnoses or Management Options  Chest wall abscess:              DIAGNOSIS  Final diagnoses:   Chest wall abscess           DISPOSITION  DISCHARGE    Patient discharged in stable condition.    Reviewed implications of results, diagnosis, meds, responsibility to follow up, warning signs and symptoms of possible worsening, potential complications and reasons to return to ER, including worsening swelling or pain.    Patient/Family voiced understanding of above instructions.    Discussed plan for discharge, as there is no emergent indication for admission. Patient referred to primary care provider for BP management due to today's BP. Pt/family is agreeable and understands need for follow up and repeat testing.  Pt is aware that discharge does not mean that nothing is wrong but it indicates no emergency is present that requires admission and they must continue care with follow-up as given below or physician of their choice.     FOLLOW-UP  Reyes, Rosenberg Acosta, MD  [address] [place] [address]  [phone]    Schedule an appointment as soon as possible for a visit            Medication List      New Prescriptions    sulfamethoxazole-trimethoprim 800-160 MG per tablet  Commonly known as: BACTRIM DS,SEPTRA DS  Take 1 tablet by mouth 2 (Two) Times a Day.           Where to Get Your Medications      You can get these medications from any pharmacy    Bring a paper prescription for each of these medications  · sulfamethoxazole-trimethoprim 800-160 MG per tablet             Latest Documented Vital Signs:  As of 14:35 EDT  BP- 127/72 HR- 95 Temp- 97.1 °F (36.2 °C) (Tympanic) O2 sat- 98%                         Cecilio Hutchinson MD  10/13/20 4893

## 2020-10-13 NOTE — ED NOTES
"Pt reports \"lump\" on the right side of his chest for three days. Pt has an approximately golf ball size abscess with erythema surrounding it to right chest.    Mask placed on patient in triage. Triage staff wore appropriate PPE during interaction with patient.        Emma Navas RN  10/13/20 1011    "

## 2020-10-15 LAB
BACTERIA SPEC AEROBE CULT: ABNORMAL
GRAM STN SPEC: ABNORMAL
GRAM STN SPEC: ABNORMAL

## 2020-11-27 ENCOUNTER — HOSPITAL ENCOUNTER (EMERGENCY)
Facility: HOSPITAL | Age: 50
Discharge: HOME | End: 2020-11-27
Attending: EMERGENCY MEDICINE | Admitting: EMERGENCY MEDICINE

## 2020-11-27 VITALS
WEIGHT: 255 LBS | TEMPERATURE: 97.3 F | DIASTOLIC BLOOD PRESSURE: 81 MMHG | SYSTOLIC BLOOD PRESSURE: 138 MMHG | BODY MASS INDEX: 34.54 KG/M2 | HEIGHT: 72 IN | HEART RATE: 86 BPM | OXYGEN SATURATION: 99 % | RESPIRATION RATE: 18 BRPM

## 2020-11-27 DIAGNOSIS — L02.414 ABSCESS OF LEFT SHOULDER: Primary | ICD-10-CM

## 2020-11-27 PROCEDURE — 87205 SMEAR GRAM STAIN: CPT | Performed by: PHYSICIAN ASSISTANT

## 2020-11-27 PROCEDURE — 87186 SC STD MICRODIL/AGAR DIL: CPT | Performed by: PHYSICIAN ASSISTANT

## 2020-11-27 PROCEDURE — 87147 CULTURE TYPE IMMUNOLOGIC: CPT | Performed by: PHYSICIAN ASSISTANT

## 2020-11-27 PROCEDURE — 99283 EMERGENCY DEPT VISIT LOW MDM: CPT

## 2020-11-27 PROCEDURE — 87070 CULTURE OTHR SPECIMN AEROBIC: CPT | Performed by: PHYSICIAN ASSISTANT

## 2020-11-27 RX ORDER — HYDROCODONE BITARTRATE AND ACETAMINOPHEN 5; 325 MG/1; MG/1
1 TABLET ORAL EVERY 6 HOURS PRN
Qty: 8 TABLET | Refills: 0 | Status: SHIPPED | OUTPATIENT
Start: 2020-11-27 | End: 2020-11-27 | Stop reason: HOSPADM

## 2020-11-27 RX ORDER — SULFAMETHOXAZOLE AND TRIMETHOPRIM 800; 160 MG/1; MG/1
2 TABLET ORAL 2 TIMES DAILY
Qty: 40 TABLET | Refills: 0 | Status: SHIPPED | OUTPATIENT
Start: 2020-11-27 | End: 2021-05-17 | Stop reason: HOSPADM

## 2020-11-27 RX ORDER — LIDOCAINE HYDROCHLORIDE AND EPINEPHRINE 10; 10 MG/ML; UG/ML
10 INJECTION, SOLUTION INFILTRATION; PERINEURAL ONCE
Status: DISCONTINUED | OUTPATIENT
Start: 2020-11-27 | End: 2020-11-27 | Stop reason: HOSPADM

## 2020-11-30 LAB
BACTERIA SPEC AEROBE CULT: ABNORMAL
GRAM STN SPEC: ABNORMAL
GRAM STN SPEC: ABNORMAL

## 2021-05-04 ENCOUNTER — APPOINTMENT (OUTPATIENT)
Dept: GENERAL RADIOLOGY | Facility: HOSPITAL | Age: 51
End: 2021-05-04

## 2021-05-04 LAB
ALBUMIN SERPL-MCNC: 3.8 G/DL (ref 3.5–5.2)
ALBUMIN/GLOB SERPL: 1.4 G/DL
ALP SERPL-CCNC: 82 U/L (ref 39–117)
ALT SERPL W P-5'-P-CCNC: 11 U/L (ref 1–41)
ANION GAP SERPL CALCULATED.3IONS-SCNC: 12.1 MMOL/L (ref 5–15)
AST SERPL-CCNC: 8 U/L (ref 1–40)
BASOPHILS # BLD AUTO: 0.05 10*3/MM3 (ref 0–0.2)
BASOPHILS NFR BLD AUTO: 0.4 % (ref 0–1.5)
BILIRUB SERPL-MCNC: 0.3 MG/DL (ref 0–1.2)
BUN SERPL-MCNC: 10 MG/DL (ref 6–20)
BUN/CREAT SERPL: 11.4 (ref 7–25)
CALCIUM SPEC-SCNC: 9.2 MG/DL (ref 8.6–10.5)
CHLORIDE SERPL-SCNC: 102 MMOL/L (ref 98–107)
CO2 SERPL-SCNC: 24.9 MMOL/L (ref 22–29)
CREAT SERPL-MCNC: 0.88 MG/DL (ref 0.76–1.27)
DEPRECATED RDW RBC AUTO: 40.8 FL (ref 37–54)
EOSINOPHIL # BLD AUTO: 0.14 10*3/MM3 (ref 0–0.4)
EOSINOPHIL NFR BLD AUTO: 1.1 % (ref 0.3–6.2)
ERYTHROCYTE [DISTWIDTH] IN BLOOD BY AUTOMATED COUNT: 13.2 % (ref 12.3–15.4)
GFR SERPL CREATININE-BSD FRML MDRD: 92 ML/MIN/1.73
GLOBULIN UR ELPH-MCNC: 2.7 GM/DL
GLUCOSE SERPL-MCNC: 293 MG/DL (ref 65–99)
HCT VFR BLD AUTO: 44.6 % (ref 37.5–51)
HGB BLD-MCNC: 15.2 G/DL (ref 13–17.7)
IMM GRANULOCYTES # BLD AUTO: 0.08 10*3/MM3 (ref 0–0.05)
IMM GRANULOCYTES NFR BLD AUTO: 0.6 % (ref 0–0.5)
LYMPHOCYTES # BLD AUTO: 3.94 10*3/MM3 (ref 0.7–3.1)
LYMPHOCYTES NFR BLD AUTO: 30.4 % (ref 19.6–45.3)
MCH RBC QN AUTO: 29.3 PG (ref 26.6–33)
MCHC RBC AUTO-ENTMCNC: 34.1 G/DL (ref 31.5–35.7)
MCV RBC AUTO: 86.1 FL (ref 79–97)
MONOCYTES # BLD AUTO: 1.07 10*3/MM3 (ref 0.1–0.9)
MONOCYTES NFR BLD AUTO: 8.2 % (ref 5–12)
NEUTROPHILS NFR BLD AUTO: 59.3 % (ref 42.7–76)
NEUTROPHILS NFR BLD AUTO: 7.69 10*3/MM3 (ref 1.7–7)
NRBC BLD AUTO-RTO: 0 /100 WBC (ref 0–0.2)
PLATELET # BLD AUTO: 280 10*3/MM3 (ref 140–450)
PMV BLD AUTO: 10.6 FL (ref 6–12)
POTASSIUM SERPL-SCNC: 4.7 MMOL/L (ref 3.5–5.2)
PROT SERPL-MCNC: 6.5 G/DL (ref 6–8.5)
RBC # BLD AUTO: 5.18 10*6/MM3 (ref 4.14–5.8)
SODIUM SERPL-SCNC: 139 MMOL/L (ref 136–145)
TROPONIN T SERPL-MCNC: <0.01 NG/ML (ref 0–0.03)
WBC # BLD AUTO: 12.97 10*3/MM3 (ref 3.4–10.8)

## 2021-05-04 PROCEDURE — 71046 X-RAY EXAM CHEST 2 VIEWS: CPT

## 2021-05-04 PROCEDURE — 83690 ASSAY OF LIPASE: CPT | Performed by: PHYSICIAN ASSISTANT

## 2021-05-04 PROCEDURE — 83880 ASSAY OF NATRIURETIC PEPTIDE: CPT | Performed by: PHYSICIAN ASSISTANT

## 2021-05-04 PROCEDURE — 82077 ASSAY SPEC XCP UR&BREATH IA: CPT | Performed by: PHYSICIAN ASSISTANT

## 2021-05-04 PROCEDURE — 80053 COMPREHEN METABOLIC PANEL: CPT | Performed by: EMERGENCY MEDICINE

## 2021-05-04 PROCEDURE — 93005 ELECTROCARDIOGRAM TRACING: CPT | Performed by: EMERGENCY MEDICINE

## 2021-05-04 PROCEDURE — 85025 COMPLETE CBC W/AUTO DIFF WBC: CPT | Performed by: EMERGENCY MEDICINE

## 2021-05-04 PROCEDURE — 93010 ELECTROCARDIOGRAM REPORT: CPT | Performed by: INTERNAL MEDICINE

## 2021-05-04 PROCEDURE — 99284 EMERGENCY DEPT VISIT MOD MDM: CPT

## 2021-05-04 PROCEDURE — 99285 EMERGENCY DEPT VISIT HI MDM: CPT

## 2021-05-04 PROCEDURE — 93005 ELECTROCARDIOGRAM TRACING: CPT

## 2021-05-04 PROCEDURE — 84484 ASSAY OF TROPONIN QUANT: CPT | Performed by: EMERGENCY MEDICINE

## 2021-05-04 RX ORDER — ASPIRIN 325 MG
325 TABLET ORAL ONCE
Status: DISCONTINUED | OUTPATIENT
Start: 2021-05-04 | End: 2021-05-05 | Stop reason: HOSPADM

## 2021-05-04 RX ORDER — SODIUM CHLORIDE 0.9 % (FLUSH) 0.9 %
10 SYRINGE (ML) INJECTION AS NEEDED
Status: DISCONTINUED | OUTPATIENT
Start: 2021-05-04 | End: 2021-05-05 | Stop reason: HOSPADM

## 2021-05-05 ENCOUNTER — HOSPITAL ENCOUNTER (OUTPATIENT)
Facility: HOSPITAL | Age: 51
Discharge: HOME | End: 2021-05-05
Attending: EMERGENCY MEDICINE | Admitting: INTERNAL MEDICINE

## 2021-05-05 ENCOUNTER — READMISSION MANAGEMENT (OUTPATIENT)
Dept: CALL CENTER | Facility: HOSPITAL | Age: 51
End: 2021-05-05

## 2021-05-05 ENCOUNTER — APPOINTMENT (OUTPATIENT)
Dept: GENERAL RADIOLOGY | Facility: HOSPITAL | Age: 51
End: 2021-05-05

## 2021-05-05 VITALS
HEIGHT: 72 IN | WEIGHT: 232 LBS | OXYGEN SATURATION: 96 % | DIASTOLIC BLOOD PRESSURE: 97 MMHG | SYSTOLIC BLOOD PRESSURE: 163 MMHG | BODY MASS INDEX: 31.42 KG/M2 | RESPIRATION RATE: 16 BRPM | HEART RATE: 80 BPM | TEMPERATURE: 98.4 F

## 2021-05-05 DIAGNOSIS — Z72.0 TOBACCO USER: ICD-10-CM

## 2021-05-05 DIAGNOSIS — R07.9 CHEST PAIN, UNSPECIFIED TYPE: Primary | ICD-10-CM

## 2021-05-05 DIAGNOSIS — E11.65 UNCONTROLLED TYPE 2 DIABETES MELLITUS WITH HYPERGLYCEMIA (HCC): ICD-10-CM

## 2021-05-05 DIAGNOSIS — I10 ESSENTIAL HYPERTENSION: ICD-10-CM

## 2021-05-05 DIAGNOSIS — E78.5 HYPERLIPIDEMIA, UNSPECIFIED HYPERLIPIDEMIA TYPE: ICD-10-CM

## 2021-05-05 LAB
AMPHET+METHAMPHET UR QL: NEGATIVE
BARBITURATES UR QL SCN: NEGATIVE
BENZODIAZ UR QL SCN: NEGATIVE
BILIRUB UR QL STRIP: NEGATIVE
CANNABINOIDS SERPL QL: NEGATIVE
CLARITY UR: CLEAR
COCAINE UR QL: NEGATIVE
COLOR UR: YELLOW
ETHANOL BLD-MCNC: <10 MG/DL (ref 0–10)
ETHANOL UR QL: <0.01 %
GLUCOSE BLDC GLUCOMTR-MCNC: 192 MG/DL (ref 70–130)
GLUCOSE BLDC GLUCOMTR-MCNC: 194 MG/DL (ref 70–130)
GLUCOSE BLDC GLUCOMTR-MCNC: 228 MG/DL (ref 70–130)
GLUCOSE UR STRIP-MCNC: ABNORMAL MG/DL
HGB UR QL STRIP.AUTO: NEGATIVE
HOLD SPECIMEN: NORMAL
HOLD SPECIMEN: NORMAL
KETONES UR QL STRIP: NEGATIVE
LEUKOCYTE ESTERASE UR QL STRIP.AUTO: NEGATIVE
LIPASE SERPL-CCNC: 19 U/L (ref 13–60)
METHADONE UR QL SCN: NEGATIVE
NITRITE UR QL STRIP: NEGATIVE
NT-PROBNP SERPL-MCNC: 88.1 PG/ML (ref 0–450)
OPIATES UR QL: NEGATIVE
OXYCODONE UR QL SCN: NEGATIVE
PH UR STRIP.AUTO: 6 [PH] (ref 5–8)
PROT UR QL STRIP: NEGATIVE
QT INTERVAL: 349 MS
SARS-COV-2 ORF1AB RESP QL NAA+PROBE: NOT DETECTED
SP GR UR STRIP: 1.01 (ref 1–1.03)
TROPONIN T SERPL-MCNC: <0.01 NG/ML (ref 0–0.03)
UROBILINOGEN UR QL STRIP: ABNORMAL
WHOLE BLOOD HOLD SPECIMEN: NORMAL
WHOLE BLOOD HOLD SPECIMEN: NORMAL

## 2021-05-05 PROCEDURE — 80307 DRUG TEST PRSMV CHEM ANLYZR: CPT | Performed by: PHYSICIAN ASSISTANT

## 2021-05-05 PROCEDURE — G0378 HOSPITAL OBSERVATION PER HR: HCPCS

## 2021-05-05 PROCEDURE — C1769 GUIDE WIRE: HCPCS | Performed by: INTERNAL MEDICINE

## 2021-05-05 PROCEDURE — 81003 URINALYSIS AUTO W/O SCOPE: CPT | Performed by: PHYSICIAN ASSISTANT

## 2021-05-05 PROCEDURE — 99152 MOD SED SAME PHYS/QHP 5/>YRS: CPT | Performed by: INTERNAL MEDICINE

## 2021-05-05 PROCEDURE — 25010000002 HEPARIN (PORCINE) PER 1000 UNITS: Performed by: INTERNAL MEDICINE

## 2021-05-05 PROCEDURE — 82962 GLUCOSE BLOOD TEST: CPT

## 2021-05-05 PROCEDURE — U0004 COV-19 TEST NON-CDC HGH THRU: HCPCS | Performed by: PHYSICIAN ASSISTANT

## 2021-05-05 PROCEDURE — C1894 INTRO/SHEATH, NON-LASER: HCPCS | Performed by: INTERNAL MEDICINE

## 2021-05-05 PROCEDURE — 84484 ASSAY OF TROPONIN QUANT: CPT

## 2021-05-05 PROCEDURE — 73130 X-RAY EXAM OF HAND: CPT

## 2021-05-05 PROCEDURE — 63710000001 INSULIN LISPRO (HUMAN) PER 5 UNITS: Performed by: INTERNAL MEDICINE

## 2021-05-05 PROCEDURE — 25010000002 FENTANYL CITRATE (PF) 100 MCG/2ML SOLUTION: Performed by: INTERNAL MEDICINE

## 2021-05-05 PROCEDURE — 0 IOPAMIDOL PER 1 ML: Performed by: INTERNAL MEDICINE

## 2021-05-05 PROCEDURE — 93458 L HRT ARTERY/VENTRICLE ANGIO: CPT | Performed by: INTERNAL MEDICINE

## 2021-05-05 PROCEDURE — 25010000002 MIDAZOLAM PER 1 MG: Performed by: INTERNAL MEDICINE

## 2021-05-05 PROCEDURE — 99153 MOD SED SAME PHYS/QHP EA: CPT | Performed by: INTERNAL MEDICINE

## 2021-05-05 PROCEDURE — 36415 COLL VENOUS BLD VENIPUNCTURE: CPT

## 2021-05-05 RX ORDER — SODIUM CHLORIDE 0.9 % (FLUSH) 0.9 %
10 SYRINGE (ML) INJECTION AS NEEDED
Status: DISCONTINUED | OUTPATIENT
Start: 2021-05-05 | End: 2021-05-05 | Stop reason: HOSPADM

## 2021-05-05 RX ORDER — MIDAZOLAM HYDROCHLORIDE 1 MG/ML
INJECTION INTRAMUSCULAR; INTRAVENOUS AS NEEDED
Status: DISCONTINUED | OUTPATIENT
Start: 2021-05-05 | End: 2021-05-05 | Stop reason: HOSPADM

## 2021-05-05 RX ORDER — FENTANYL CITRATE 50 UG/ML
INJECTION, SOLUTION INTRAMUSCULAR; INTRAVENOUS AS NEEDED
Status: DISCONTINUED | OUTPATIENT
Start: 2021-05-05 | End: 2021-05-05 | Stop reason: HOSPADM

## 2021-05-05 RX ORDER — ASPIRIN 81 MG/1
81 TABLET ORAL DAILY
Start: 2021-05-05

## 2021-05-05 RX ORDER — ACETAMINOPHEN 325 MG/1
650 TABLET ORAL EVERY 4 HOURS PRN
Status: DISCONTINUED | OUTPATIENT
Start: 2021-05-05 | End: 2021-05-05 | Stop reason: HOSPADM

## 2021-05-05 RX ORDER — ATORVASTATIN CALCIUM 40 MG/1
40 TABLET, FILM COATED ORAL DAILY
Qty: 90 TABLET | Refills: 3 | Status: SHIPPED | OUTPATIENT
Start: 2021-05-05

## 2021-05-05 RX ORDER — LISINOPRIL 20 MG/1
10 TABLET ORAL
Status: DISCONTINUED | OUTPATIENT
Start: 2021-05-06 | End: 2021-05-05 | Stop reason: HOSPADM

## 2021-05-05 RX ORDER — SODIUM CHLORIDE 0.9 % (FLUSH) 0.9 %
3 SYRINGE (ML) INJECTION EVERY 12 HOURS SCHEDULED
Status: DISCONTINUED | OUTPATIENT
Start: 2021-05-05 | End: 2021-05-05 | Stop reason: HOSPADM

## 2021-05-05 RX ORDER — NICOTINE POLACRILEX 4 MG
15 LOZENGE BUCCAL
Status: DISCONTINUED | OUTPATIENT
Start: 2021-05-05 | End: 2021-05-05 | Stop reason: HOSPADM

## 2021-05-05 RX ORDER — INSULIN LISPRO 100 [IU]/ML
0-9 INJECTION, SOLUTION INTRAVENOUS; SUBCUTANEOUS
Status: DISCONTINUED | OUTPATIENT
Start: 2021-05-05 | End: 2021-05-05 | Stop reason: HOSPADM

## 2021-05-05 RX ORDER — DEXTROSE MONOHYDRATE 25 G/50ML
25 INJECTION, SOLUTION INTRAVENOUS
Status: DISCONTINUED | OUTPATIENT
Start: 2021-05-05 | End: 2021-05-05 | Stop reason: HOSPADM

## 2021-05-05 RX ORDER — SODIUM CHLORIDE 9 MG/ML
INJECTION, SOLUTION INTRAVENOUS CONTINUOUS PRN
Status: COMPLETED | OUTPATIENT
Start: 2021-05-05 | End: 2021-05-05

## 2021-05-05 RX ORDER — LIDOCAINE HYDROCHLORIDE 20 MG/ML
INJECTION, SOLUTION INFILTRATION; PERINEURAL AS NEEDED
Status: DISCONTINUED | OUTPATIENT
Start: 2021-05-05 | End: 2021-05-05 | Stop reason: HOSPADM

## 2021-05-05 RX ADMIN — SODIUM CHLORIDE 1000 ML: 9 INJECTION, SOLUTION INTRAVENOUS at 04:03

## 2021-05-05 RX ADMIN — INSULIN LISPRO 2 UNITS: 100 INJECTION, SOLUTION INTRAVENOUS; SUBCUTANEOUS at 09:06

## 2021-05-05 RX ADMIN — INSULIN LISPRO 4 UNITS: 100 INJECTION, SOLUTION INTRAVENOUS; SUBCUTANEOUS at 11:57

## 2021-05-05 NOTE — OUTREACH NOTE
Prep Survey      Responses   [facility] facility patient discharged from?  [facility]   Is LACE score < 7 ?  No   Emergency Room discharge w/ pulse ox?  No   Eligibility  [facility]   Date of Admission  05/05/21   Date of Discharge  05/05/21   Discharge Disposition  Home or Self Care   Discharge diagnosis  Chest pain-heart cath this visit   Does the patient have one of the following disease processes/diagnoses(primary or secondary)?  Other   Does the patient have Home health ordered?  No   Is there a DME ordered?  No   Prep survey completed?  Yes          Patsy Martines RN

## 2021-05-06 ENCOUNTER — TELEPHONE (OUTPATIENT)
Dept: CARDIOLOGY | Facility: CLINIC | Age: 51
End: 2021-05-06

## 2021-05-06 ENCOUNTER — TRANSITIONAL CARE MANAGEMENT TELEPHONE ENCOUNTER (OUTPATIENT)
Dept: CALL CENTER | Facility: HOSPITAL | Age: 51
End: 2021-05-06

## 2021-05-06 NOTE — OUTREACH NOTE
"Call Center TCM Note      Responses   [facility] patient discharged from?  [place]   Does the patient have one of the following disease processes/diagnoses(primary or secondary)?  Other   TCM attempt successful?  Yes   Call start time  1320   Call end time  1326   Discharge diagnosis  Chest pain-heart cath this visit   Meds reviewed with patient/caregiver?  Yes   Does the patient have all medications ordered at discharge?  Yes   Is the patient taking all medications as directed (includes completed medication regime)?  No   What is preventing the patient from taking all medications as directed?  Other [Pt was not sure what they sent and where they sent it to]   Nursing Interventions  Nurse provided patient education   Does the patient have a primary care provider?   Yes   Does the patient have an appointment with their PCP within 7 days of discharge?  No   What is preventing the patient from scheduling follow up appointments within 7 days of discharge?  Waiting on return call   Nursing Interventions  Verified appointment date/time/provider   Has the patient kept scheduled appointments due by today?  N/A   Psychosocial issues?  No   Psychosocial comments  Pt states, \"I don't take care of myself like I should. I'm going to try to do better.\"   Did the patient receive a copy of their discharge instructions?  Yes   Nursing interventions  Reviewed instructions with patient   What is the patient's perception of their health status since discharge?  Improving   Is the patient/caregiver able to teach back signs and symptoms related to disease process for when to call PCP?  Yes   Is the patient/caregiver able to teach back signs and symptoms related to disease process for when to call 911?  Yes   Is the patient/caregiver able to teach back the hierarchy of who to call/visit for symptoms/problems? PCP, Specialist, Home health nurse, Urgent Care, ED, 911  Yes   If the patient is a current smoker, are they able to teach " back resources for cessation?  [id number]   TCM call completed?  Yes          Mercedes Holm RN    5/6/2021, 13:29 EDT

## 2021-05-06 NOTE — TELEPHONE ENCOUNTER
Pt called asking about his xray for his hands? He mentioned that his R hand is fractured?  You did a cardiac cath on him 5/5/21.

## 2021-05-06 NOTE — OUTREACH NOTE
"Call Center TCM Note      Responses   [facility] patient discharged from?  [place]   Does the patient have one of the following disease processes/diagnoses(primary or secondary)?  Other   TCM attempt successful?  No [No verbal release. Patients voicemail says, \"Nino Yost\". ]   Unsuccessful attempts  Attempt 1          Mercedes Holm RN    5/6/2021, 13:01 EDT      "

## 2021-05-12 ENCOUNTER — READMISSION MANAGEMENT (OUTPATIENT)
Dept: CALL CENTER | Facility: HOSPITAL | Age: 51
End: 2021-05-12

## 2021-05-12 NOTE — OUTREACH NOTE
Medical Week 2 Survey      Responses   [facility] patient discharged from?  [place]   Does the patient have one of the following disease processes/diagnoses(primary or secondary)?  Other   Week 2 attempt successful?  No   Unsuccessful attempts  Attempt 1          Alona Soto RN

## 2021-05-13 ENCOUNTER — ANESTHESIA EVENT (OUTPATIENT)
Dept: PERIOP | Facility: HOSPITAL | Age: 51
End: 2021-05-13

## 2021-05-13 ENCOUNTER — ANESTHESIA (OUTPATIENT)
Dept: PERIOP | Facility: HOSPITAL | Age: 51
End: 2021-05-13

## 2021-05-13 ENCOUNTER — APPOINTMENT (OUTPATIENT)
Dept: CT IMAGING | Facility: HOSPITAL | Age: 51
End: 2021-05-13

## 2021-05-13 ENCOUNTER — HOSPITAL ENCOUNTER (INPATIENT)
Facility: HOSPITAL | Age: 51
LOS: 4 days | Discharge: HOME HEALTH | End: 2021-05-17
Attending: EMERGENCY MEDICINE | Admitting: INTERNAL MEDICINE

## 2021-05-13 ENCOUNTER — READMISSION MANAGEMENT (OUTPATIENT)
Dept: CALL CENTER | Facility: HOSPITAL | Age: 51
End: 2021-05-13

## 2021-05-13 ENCOUNTER — APPOINTMENT (OUTPATIENT)
Dept: ULTRASOUND IMAGING | Facility: HOSPITAL | Age: 51
End: 2021-05-13

## 2021-05-13 DIAGNOSIS — N49.3 FOURNIER'S GANGRENE IN MALE: Primary | ICD-10-CM

## 2021-05-13 DIAGNOSIS — N49.3 FOURNIER GANGRENE: ICD-10-CM

## 2021-05-13 LAB
ALBUMIN SERPL-MCNC: 3.8 G/DL (ref 3.5–5.2)
ALBUMIN/GLOB SERPL: 1.1 G/DL
ALP SERPL-CCNC: 91 U/L (ref 39–117)
ALT SERPL W P-5'-P-CCNC: 7 U/L (ref 1–41)
ANION GAP SERPL CALCULATED.3IONS-SCNC: 11.4 MMOL/L (ref 5–15)
AST SERPL-CCNC: 9 U/L (ref 1–40)
BASOPHILS # BLD AUTO: 0.06 10*3/MM3 (ref 0–0.2)
BASOPHILS NFR BLD AUTO: 0.4 % (ref 0–1.5)
BILIRUB SERPL-MCNC: 0.3 MG/DL (ref 0–1.2)
BUN SERPL-MCNC: 11 MG/DL (ref 6–20)
BUN/CREAT SERPL: 17.5 (ref 7–25)
CALCIUM SPEC-SCNC: 9.5 MG/DL (ref 8.6–10.5)
CHLORIDE SERPL-SCNC: 98 MMOL/L (ref 98–107)
CO2 SERPL-SCNC: 25.6 MMOL/L (ref 22–29)
CREAT SERPL-MCNC: 0.63 MG/DL (ref 0.76–1.27)
D-LACTATE SERPL-SCNC: 1.8 MMOL/L (ref 0.5–2)
D-LACTATE SERPL-SCNC: 2.5 MMOL/L (ref 0.5–2)
DEPRECATED RDW RBC AUTO: 43.3 FL (ref 37–54)
EOSINOPHIL # BLD AUTO: 0.1 10*3/MM3 (ref 0–0.4)
EOSINOPHIL NFR BLD AUTO: 0.7 % (ref 0.3–6.2)
ERYTHROCYTE [DISTWIDTH] IN BLOOD BY AUTOMATED COUNT: 13.5 % (ref 12.3–15.4)
GFR SERPL CREATININE-BSD FRML MDRD: 136 ML/MIN/1.73
GLOBULIN UR ELPH-MCNC: 3.5 GM/DL
GLUCOSE BLDC GLUCOMTR-MCNC: 188 MG/DL (ref 70–130)
GLUCOSE BLDC GLUCOMTR-MCNC: 195 MG/DL (ref 70–130)
GLUCOSE BLDC GLUCOMTR-MCNC: 195 MG/DL (ref 70–130)
GLUCOSE SERPL-MCNC: 344 MG/DL (ref 65–99)
HCT VFR BLD AUTO: 47.3 % (ref 37.5–51)
HGB BLD-MCNC: 15.8 G/DL (ref 13–17.7)
IMM GRANULOCYTES # BLD AUTO: 0.06 10*3/MM3 (ref 0–0.05)
IMM GRANULOCYTES NFR BLD AUTO: 0.4 % (ref 0–0.5)
LYMPHOCYTES # BLD AUTO: 2.92 10*3/MM3 (ref 0.7–3.1)
LYMPHOCYTES NFR BLD AUTO: 20.6 % (ref 19.6–45.3)
MCH RBC QN AUTO: 29.3 PG (ref 26.6–33)
MCHC RBC AUTO-ENTMCNC: 33.4 G/DL (ref 31.5–35.7)
MCV RBC AUTO: 87.8 FL (ref 79–97)
MONOCYTES # BLD AUTO: 1.1 10*3/MM3 (ref 0.1–0.9)
MONOCYTES NFR BLD AUTO: 7.8 % (ref 5–12)
NEUTROPHILS NFR BLD AUTO: 70.1 % (ref 42.7–76)
NEUTROPHILS NFR BLD AUTO: 9.95 10*3/MM3 (ref 1.7–7)
NRBC BLD AUTO-RTO: 0 /100 WBC (ref 0–0.2)
PLATELET # BLD AUTO: 342 10*3/MM3 (ref 140–450)
PMV BLD AUTO: 10.2 FL (ref 6–12)
POTASSIUM SERPL-SCNC: 4.3 MMOL/L (ref 3.5–5.2)
PROT SERPL-MCNC: 7.3 G/DL (ref 6–8.5)
RBC # BLD AUTO: 5.39 10*6/MM3 (ref 4.14–5.8)
SARS-COV-2 RNA RESP QL NAA+PROBE: NOT DETECTED
SODIUM SERPL-SCNC: 135 MMOL/L (ref 136–145)
WBC # BLD AUTO: 14.19 10*3/MM3 (ref 3.4–10.8)

## 2021-05-13 PROCEDURE — 25010000002 IOPAMIDOL 61 % SOLUTION: Performed by: EMERGENCY MEDICINE

## 2021-05-13 PROCEDURE — 87205 SMEAR GRAM STAIN: CPT | Performed by: UROLOGY

## 2021-05-13 PROCEDURE — 25010000002 ONDANSETRON PER 1 MG: Performed by: ANESTHESIOLOGY

## 2021-05-13 PROCEDURE — 99284 EMERGENCY DEPT VISIT MOD MDM: CPT

## 2021-05-13 PROCEDURE — 25010000002 MIDAZOLAM PER 1 MG: Performed by: ANESTHESIOLOGY

## 2021-05-13 PROCEDURE — 0V950ZZ DRAINAGE OF SCROTUM, OPEN APPROACH: ICD-10-PCS | Performed by: UROLOGY

## 2021-05-13 PROCEDURE — U0003 INFECTIOUS AGENT DETECTION BY NUCLEIC ACID (DNA OR RNA); SEVERE ACUTE RESPIRATORY SYNDROME CORONAVIRUS 2 (SARS-COV-2) (CORONAVIRUS DISEASE [COVID-19]), AMPLIFIED PROBE TECHNIQUE, MAKING USE OF HIGH THROUGHPUT TECHNOLOGIES AS DESCRIBED BY CMS-2020-01-R: HCPCS | Performed by: UROLOGY

## 2021-05-13 PROCEDURE — 25010000002 PIPERACILLIN SOD-TAZOBACTAM PER 1 G: Performed by: EMERGENCY MEDICINE

## 2021-05-13 PROCEDURE — 25010000002 VANCOMYCIN 10 G RECONSTITUTED SOLUTION: Performed by: EMERGENCY MEDICINE

## 2021-05-13 PROCEDURE — 76870 US EXAM SCROTUM: CPT

## 2021-05-13 PROCEDURE — 85025 COMPLETE CBC W/AUTO DIFF WBC: CPT | Performed by: EMERGENCY MEDICINE

## 2021-05-13 PROCEDURE — 87075 CULTR BACTERIA EXCEPT BLOOD: CPT | Performed by: UROLOGY

## 2021-05-13 PROCEDURE — 87040 BLOOD CULTURE FOR BACTERIA: CPT | Performed by: EMERGENCY MEDICINE

## 2021-05-13 PROCEDURE — 0HBAXZZ EXCISION OF INGUINAL SKIN, EXTERNAL APPROACH: ICD-10-PCS | Performed by: UROLOGY

## 2021-05-13 PROCEDURE — 80053 COMPREHEN METABOLIC PANEL: CPT | Performed by: EMERGENCY MEDICINE

## 2021-05-13 PROCEDURE — 72193 CT PELVIS W/DYE: CPT

## 2021-05-13 PROCEDURE — 25010000002 FENTANYL CITRATE (PF) 100 MCG/2ML SOLUTION: Performed by: ANESTHESIOLOGY

## 2021-05-13 PROCEDURE — 83605 ASSAY OF LACTIC ACID: CPT | Performed by: EMERGENCY MEDICINE

## 2021-05-13 PROCEDURE — 25010000002 HYDROMORPHONE PER 4 MG: Performed by: ANESTHESIOLOGY

## 2021-05-13 PROCEDURE — 25010000002 PROPOFOL 10 MG/ML EMULSION: Performed by: ANESTHESIOLOGY

## 2021-05-13 PROCEDURE — 25010000002 ENOXAPARIN PER 10 MG: Performed by: INTERNAL MEDICINE

## 2021-05-13 PROCEDURE — 25010000002 PHENYLEPHRINE PER 1 ML: Performed by: ANESTHESIOLOGY

## 2021-05-13 PROCEDURE — 63710000001 INSULIN LISPRO (HUMAN) PER 5 UNITS: Performed by: UROLOGY

## 2021-05-13 PROCEDURE — 87070 CULTURE OTHR SPECIMN AEROBIC: CPT | Performed by: UROLOGY

## 2021-05-13 PROCEDURE — 93976 VASCULAR STUDY: CPT

## 2021-05-13 PROCEDURE — 25010000002 MORPHINE PER 10 MG: Performed by: EMERGENCY MEDICINE

## 2021-05-13 PROCEDURE — 25010000002 HYDROMORPHONE PER 4 MG: Performed by: UROLOGY

## 2021-05-13 PROCEDURE — 82962 GLUCOSE BLOOD TEST: CPT

## 2021-05-13 PROCEDURE — 25010000002 CEFTRIAXONE PER 250 MG: Performed by: EMERGENCY MEDICINE

## 2021-05-13 RX ORDER — MORPHINE SULFATE 2 MG/ML
4 INJECTION, SOLUTION INTRAMUSCULAR; INTRAVENOUS ONCE
Status: COMPLETED | OUTPATIENT
Start: 2021-05-13 | End: 2021-05-13

## 2021-05-13 RX ORDER — MIDAZOLAM HYDROCHLORIDE 1 MG/ML
1 INJECTION INTRAMUSCULAR; INTRAVENOUS
Status: DISCONTINUED | OUTPATIENT
Start: 2021-05-13 | End: 2021-05-13 | Stop reason: HOSPADM

## 2021-05-13 RX ORDER — LOSARTAN POTASSIUM 50 MG/1
50 TABLET ORAL
Status: DISCONTINUED | OUTPATIENT
Start: 2021-05-14 | End: 2021-05-17 | Stop reason: HOSPADM

## 2021-05-13 RX ORDER — LIDOCAINE HYDROCHLORIDE 10 MG/ML
0.5 INJECTION, SOLUTION EPIDURAL; INFILTRATION; INTRACAUDAL; PERINEURAL ONCE AS NEEDED
Status: DISCONTINUED | OUTPATIENT
Start: 2021-05-13 | End: 2021-05-13 | Stop reason: HOSPADM

## 2021-05-13 RX ORDER — FAMOTIDINE 10 MG/ML
20 INJECTION, SOLUTION INTRAVENOUS ONCE
Status: COMPLETED | OUTPATIENT
Start: 2021-05-13 | End: 2021-05-13

## 2021-05-13 RX ORDER — FENTANYL CITRATE 50 UG/ML
50 INJECTION, SOLUTION INTRAMUSCULAR; INTRAVENOUS
Status: DISCONTINUED | OUTPATIENT
Start: 2021-05-13 | End: 2021-05-13 | Stop reason: HOSPADM

## 2021-05-13 RX ORDER — CEFTRIAXONE SODIUM 1 G/50ML
1 INJECTION, SOLUTION INTRAVENOUS ONCE
Status: COMPLETED | OUTPATIENT
Start: 2021-05-13 | End: 2021-05-13

## 2021-05-13 RX ORDER — SODIUM CHLORIDE 9 MG/ML
100 INJECTION, SOLUTION INTRAVENOUS CONTINUOUS
Status: DISCONTINUED | OUTPATIENT
Start: 2021-05-13 | End: 2021-05-17 | Stop reason: HOSPADM

## 2021-05-13 RX ORDER — OLMESARTAN MEDOXOMIL 20 MG/1
20 TABLET ORAL DAILY
COMMUNITY

## 2021-05-13 RX ORDER — ONDANSETRON 2 MG/ML
4 INJECTION INTRAMUSCULAR; INTRAVENOUS EVERY 6 HOURS PRN
Status: DISCONTINUED | OUTPATIENT
Start: 2021-05-13 | End: 2021-05-17 | Stop reason: HOSPADM

## 2021-05-13 RX ORDER — LIDOCAINE HYDROCHLORIDE 40 MG/ML
SOLUTION TOPICAL AS NEEDED
Status: DISCONTINUED | OUTPATIENT
Start: 2021-05-13 | End: 2021-05-13 | Stop reason: SURG

## 2021-05-13 RX ORDER — FLUMAZENIL 0.1 MG/ML
0.2 INJECTION INTRAVENOUS AS NEEDED
Status: DISCONTINUED | OUTPATIENT
Start: 2021-05-13 | End: 2021-05-13 | Stop reason: HOSPADM

## 2021-05-13 RX ORDER — LABETALOL HYDROCHLORIDE 5 MG/ML
5 INJECTION, SOLUTION INTRAVENOUS
Status: DISCONTINUED | OUTPATIENT
Start: 2021-05-13 | End: 2021-05-13 | Stop reason: HOSPADM

## 2021-05-13 RX ORDER — LIDOCAINE HYDROCHLORIDE 20 MG/ML
INJECTION, SOLUTION INFILTRATION; PERINEURAL AS NEEDED
Status: DISCONTINUED | OUTPATIENT
Start: 2021-05-13 | End: 2021-05-13 | Stop reason: SURG

## 2021-05-13 RX ORDER — HYDROMORPHONE HCL 110MG/55ML
PATIENT CONTROLLED ANALGESIA SYRINGE INTRAVENOUS AS NEEDED
Status: DISCONTINUED | OUTPATIENT
Start: 2021-05-13 | End: 2021-05-13 | Stop reason: SURG

## 2021-05-13 RX ORDER — SODIUM CHLORIDE 0.9 % (FLUSH) 0.9 %
3-10 SYRINGE (ML) INJECTION AS NEEDED
Status: DISCONTINUED | OUTPATIENT
Start: 2021-05-13 | End: 2021-05-13 | Stop reason: HOSPADM

## 2021-05-13 RX ORDER — SODIUM CHLORIDE 0.9 % (FLUSH) 0.9 %
3 SYRINGE (ML) INJECTION EVERY 12 HOURS SCHEDULED
Status: DISCONTINUED | OUTPATIENT
Start: 2021-05-13 | End: 2021-05-13 | Stop reason: HOSPADM

## 2021-05-13 RX ORDER — SODIUM CHLORIDE 0.9 % (FLUSH) 0.9 %
10 SYRINGE (ML) INJECTION AS NEEDED
Status: DISCONTINUED | OUTPATIENT
Start: 2021-05-13 | End: 2021-05-17 | Stop reason: HOSPADM

## 2021-05-13 RX ORDER — ACETAMINOPHEN 325 MG/1
650 TABLET ORAL EVERY 4 HOURS PRN
Status: DISCONTINUED | OUTPATIENT
Start: 2021-05-13 | End: 2021-05-17 | Stop reason: HOSPADM

## 2021-05-13 RX ORDER — CIPROFLOXACIN 500 MG/1
500 TABLET, FILM COATED ORAL 2 TIMES DAILY
COMMUNITY
End: 2021-05-17 | Stop reason: HOSPADM

## 2021-05-13 RX ORDER — ONDANSETRON 4 MG/1
4 TABLET, FILM COATED ORAL EVERY 6 HOURS PRN
Status: DISCONTINUED | OUTPATIENT
Start: 2021-05-13 | End: 2021-05-17 | Stop reason: HOSPADM

## 2021-05-13 RX ORDER — PROPOFOL 10 MG/ML
VIAL (ML) INTRAVENOUS AS NEEDED
Status: DISCONTINUED | OUTPATIENT
Start: 2021-05-13 | End: 2021-05-13 | Stop reason: SURG

## 2021-05-13 RX ORDER — ONDANSETRON 2 MG/ML
4 INJECTION INTRAMUSCULAR; INTRAVENOUS ONCE AS NEEDED
Status: DISCONTINUED | OUTPATIENT
Start: 2021-05-13 | End: 2021-05-13 | Stop reason: HOSPADM

## 2021-05-13 RX ORDER — BUPIVACAINE HYDROCHLORIDE 2.5 MG/ML
INJECTION, SOLUTION EPIDURAL; INFILTRATION; INTRACAUDAL AS NEEDED
Status: DISCONTINUED | OUTPATIENT
Start: 2021-05-13 | End: 2021-05-13 | Stop reason: HOSPADM

## 2021-05-13 RX ORDER — DEXTROSE MONOHYDRATE 25 G/50ML
25 INJECTION, SOLUTION INTRAVENOUS
Status: DISCONTINUED | OUTPATIENT
Start: 2021-05-13 | End: 2021-05-17 | Stop reason: HOSPADM

## 2021-05-13 RX ORDER — ONDANSETRON 2 MG/ML
INJECTION INTRAMUSCULAR; INTRAVENOUS AS NEEDED
Status: DISCONTINUED | OUTPATIENT
Start: 2021-05-13 | End: 2021-05-13 | Stop reason: SURG

## 2021-05-13 RX ORDER — AMOXICILLIN 250 MG
2 CAPSULE ORAL 2 TIMES DAILY
Status: DISCONTINUED | OUTPATIENT
Start: 2021-05-13 | End: 2021-05-17 | Stop reason: HOSPADM

## 2021-05-13 RX ORDER — OXYCODONE AND ACETAMINOPHEN 7.5; 325 MG/1; MG/1
1 TABLET ORAL ONCE AS NEEDED
Status: DISCONTINUED | OUTPATIENT
Start: 2021-05-13 | End: 2021-05-13 | Stop reason: HOSPADM

## 2021-05-13 RX ORDER — DIPHENHYDRAMINE HYDROCHLORIDE 50 MG/ML
12.5 INJECTION INTRAMUSCULAR; INTRAVENOUS
Status: DISCONTINUED | OUTPATIENT
Start: 2021-05-13 | End: 2021-05-13 | Stop reason: HOSPADM

## 2021-05-13 RX ORDER — UREA 10 %
3 LOTION (ML) TOPICAL NIGHTLY PRN
Status: DISCONTINUED | OUTPATIENT
Start: 2021-05-13 | End: 2021-05-17 | Stop reason: HOSPADM

## 2021-05-13 RX ORDER — EPHEDRINE SULFATE 50 MG/ML
5 INJECTION, SOLUTION INTRAVENOUS ONCE AS NEEDED
Status: DISCONTINUED | OUTPATIENT
Start: 2021-05-13 | End: 2021-05-13 | Stop reason: HOSPADM

## 2021-05-13 RX ORDER — HYDROMORPHONE HYDROCHLORIDE 1 MG/ML
0.5 INJECTION, SOLUTION INTRAMUSCULAR; INTRAVENOUS; SUBCUTANEOUS
Status: DISCONTINUED | OUTPATIENT
Start: 2021-05-13 | End: 2021-05-16

## 2021-05-13 RX ORDER — INSULIN LISPRO 100 [IU]/ML
0-9 INJECTION, SOLUTION INTRAVENOUS; SUBCUTANEOUS
Status: DISCONTINUED | OUTPATIENT
Start: 2021-05-13 | End: 2021-05-17 | Stop reason: HOSPADM

## 2021-05-13 RX ORDER — DIPHENHYDRAMINE HCL 25 MG
25 CAPSULE ORAL
Status: DISCONTINUED | OUTPATIENT
Start: 2021-05-13 | End: 2021-05-13 | Stop reason: HOSPADM

## 2021-05-13 RX ORDER — NALOXONE HCL 0.4 MG/ML
0.2 VIAL (ML) INJECTION AS NEEDED
Status: DISCONTINUED | OUTPATIENT
Start: 2021-05-13 | End: 2021-05-13 | Stop reason: HOSPADM

## 2021-05-13 RX ORDER — ATORVASTATIN CALCIUM 20 MG/1
40 TABLET, FILM COATED ORAL DAILY
Status: DISCONTINUED | OUTPATIENT
Start: 2021-05-14 | End: 2021-05-17 | Stop reason: HOSPADM

## 2021-05-13 RX ORDER — AMLODIPINE BESYLATE 10 MG/1
10 TABLET ORAL DAILY
Status: DISCONTINUED | OUTPATIENT
Start: 2021-05-14 | End: 2021-05-17 | Stop reason: HOSPADM

## 2021-05-13 RX ORDER — NITROGLYCERIN 0.4 MG/1
0.4 TABLET SUBLINGUAL
Status: DISCONTINUED | OUTPATIENT
Start: 2021-05-13 | End: 2021-05-17 | Stop reason: HOSPADM

## 2021-05-13 RX ORDER — HYDROMORPHONE HCL 110MG/55ML
0.5 PATIENT CONTROLLED ANALGESIA SYRINGE INTRAVENOUS
Status: DISCONTINUED | OUTPATIENT
Start: 2021-05-13 | End: 2021-05-13

## 2021-05-13 RX ORDER — AMLODIPINE BESYLATE 10 MG/1
10 TABLET ORAL DAILY
COMMUNITY

## 2021-05-13 RX ORDER — ROCURONIUM BROMIDE 10 MG/ML
INJECTION, SOLUTION INTRAVENOUS AS NEEDED
Status: DISCONTINUED | OUTPATIENT
Start: 2021-05-13 | End: 2021-05-13 | Stop reason: SURG

## 2021-05-13 RX ORDER — OXYCODONE HYDROCHLORIDE AND ACETAMINOPHEN 5; 325 MG/1; MG/1
1 TABLET ORAL EVERY 4 HOURS PRN
Status: DISCONTINUED | OUTPATIENT
Start: 2021-05-13 | End: 2021-05-14

## 2021-05-13 RX ORDER — SODIUM CHLORIDE, SODIUM LACTATE, POTASSIUM CHLORIDE, CALCIUM CHLORIDE 600; 310; 30; 20 MG/100ML; MG/100ML; MG/100ML; MG/100ML
9 INJECTION, SOLUTION INTRAVENOUS CONTINUOUS
Status: DISCONTINUED | OUTPATIENT
Start: 2021-05-13 | End: 2021-05-17 | Stop reason: HOSPADM

## 2021-05-13 RX ORDER — NALOXONE HCL 0.4 MG/ML
0.1 VIAL (ML) INJECTION
Status: DISCONTINUED | OUTPATIENT
Start: 2021-05-13 | End: 2021-05-17 | Stop reason: HOSPADM

## 2021-05-13 RX ORDER — NICOTINE POLACRILEX 4 MG
15 LOZENGE BUCCAL
Status: DISCONTINUED | OUTPATIENT
Start: 2021-05-13 | End: 2021-05-17 | Stop reason: HOSPADM

## 2021-05-13 RX ORDER — FENTANYL CITRATE 50 UG/ML
INJECTION, SOLUTION INTRAMUSCULAR; INTRAVENOUS AS NEEDED
Status: DISCONTINUED | OUTPATIENT
Start: 2021-05-13 | End: 2021-05-13 | Stop reason: SURG

## 2021-05-13 RX ORDER — PROMETHAZINE HYDROCHLORIDE 25 MG/1
25 TABLET ORAL ONCE AS NEEDED
Status: DISCONTINUED | OUTPATIENT
Start: 2021-05-13 | End: 2021-05-13 | Stop reason: HOSPADM

## 2021-05-13 RX ORDER — MAGNESIUM HYDROXIDE 1200 MG/15ML
LIQUID ORAL AS NEEDED
Status: DISCONTINUED | OUTPATIENT
Start: 2021-05-13 | End: 2021-05-13 | Stop reason: HOSPADM

## 2021-05-13 RX ORDER — HYDROCODONE BITARTRATE AND ACETAMINOPHEN 7.5; 325 MG/1; MG/1
1 TABLET ORAL ONCE AS NEEDED
Status: DISCONTINUED | OUTPATIENT
Start: 2021-05-13 | End: 2021-05-13 | Stop reason: HOSPADM

## 2021-05-13 RX ORDER — PROMETHAZINE HYDROCHLORIDE 25 MG/1
25 SUPPOSITORY RECTAL ONCE AS NEEDED
Status: DISCONTINUED | OUTPATIENT
Start: 2021-05-13 | End: 2021-05-13 | Stop reason: HOSPADM

## 2021-05-13 RX ORDER — HYDROMORPHONE HYDROCHLORIDE 1 MG/ML
0.5 INJECTION, SOLUTION INTRAMUSCULAR; INTRAVENOUS; SUBCUTANEOUS
Status: DISCONTINUED | OUTPATIENT
Start: 2021-05-13 | End: 2021-05-13 | Stop reason: HOSPADM

## 2021-05-13 RX ORDER — EPHEDRINE SULFATE 50 MG/ML
INJECTION, SOLUTION INTRAVENOUS AS NEEDED
Status: DISCONTINUED | OUTPATIENT
Start: 2021-05-13 | End: 2021-05-13 | Stop reason: SURG

## 2021-05-13 RX ORDER — PANTOPRAZOLE SODIUM 40 MG/1
40 TABLET, DELAYED RELEASE ORAL EVERY MORNING
Status: DISCONTINUED | OUTPATIENT
Start: 2021-05-14 | End: 2021-05-17 | Stop reason: HOSPADM

## 2021-05-13 RX ADMIN — HYDROMORPHONE HYDROCHLORIDE 0.5 MG: 1 INJECTION, SOLUTION INTRAMUSCULAR; INTRAVENOUS; SUBCUTANEOUS at 19:25

## 2021-05-13 RX ADMIN — LIDOCAINE HYDROCHLORIDE 100 MG: 20 INJECTION, SOLUTION INFILTRATION; PERINEURAL at 17:44

## 2021-05-13 RX ADMIN — SUGAMMADEX 200 MG: 100 INJECTION, SOLUTION INTRAVENOUS at 18:10

## 2021-05-13 RX ADMIN — HYDROMORPHONE HYDROCHLORIDE 1 MG: 2 INJECTION, SOLUTION INTRAMUSCULAR; INTRAVENOUS; SUBCUTANEOUS at 18:12

## 2021-05-13 RX ADMIN — FAMOTIDINE 20 MG: 10 INJECTION INTRAVENOUS at 17:27

## 2021-05-13 RX ADMIN — HYDROMORPHONE HYDROCHLORIDE 1 MG: 2 INJECTION, SOLUTION INTRAMUSCULAR; INTRAVENOUS; SUBCUTANEOUS at 18:14

## 2021-05-13 RX ADMIN — FENTANYL CITRATE 50 MCG: 50 INJECTION, SOLUTION INTRAMUSCULAR; INTRAVENOUS at 19:30

## 2021-05-13 RX ADMIN — ENOXAPARIN SODIUM 40 MG: 40 INJECTION SUBCUTANEOUS at 21:36

## 2021-05-13 RX ADMIN — HYDROMORPHONE HYDROCHLORIDE 0.5 MG: 1 INJECTION, SOLUTION INTRAMUSCULAR; INTRAVENOUS; SUBCUTANEOUS at 19:45

## 2021-05-13 RX ADMIN — EPHEDRINE SULFATE 10 MG: 50 INJECTION INTRAVENOUS at 18:17

## 2021-05-13 RX ADMIN — CEFTRIAXONE SODIUM 1 G: 1 INJECTION, SOLUTION INTRAVENOUS at 13:44

## 2021-05-13 RX ADMIN — MORPHINE SULFATE 4 MG: 2 INJECTION, SOLUTION INTRAMUSCULAR; INTRAVENOUS at 14:55

## 2021-05-13 RX ADMIN — PROPOFOL 200 MG: 10 INJECTION, EMULSION INTRAVENOUS at 17:44

## 2021-05-13 RX ADMIN — IOPAMIDOL 100 ML: 612 INJECTION, SOLUTION INTRAVENOUS at 14:36

## 2021-05-13 RX ADMIN — LIDOCAINE HYDROCHLORIDE 1 EACH: 40 SOLUTION TOPICAL at 17:48

## 2021-05-13 RX ADMIN — HYDROMORPHONE HYDROCHLORIDE 0.5 MG: 1 INJECTION, SOLUTION INTRAMUSCULAR; INTRAVENOUS; SUBCUTANEOUS at 19:35

## 2021-05-13 RX ADMIN — PHENYLEPHRINE HYDROCHLORIDE 200 MCG: 10 INJECTION INTRAVENOUS at 17:58

## 2021-05-13 RX ADMIN — DOCUSATE SODIUM 50MG AND SENNOSIDES 8.6MG 2 TABLET: 8.6; 5 TABLET, FILM COATED ORAL at 21:40

## 2021-05-13 RX ADMIN — INSULIN LISPRO 2 UNITS: 100 INJECTION, SOLUTION INTRAVENOUS; SUBCUTANEOUS at 21:35

## 2021-05-13 RX ADMIN — TAZOBACTAM SODIUM AND PIPERACILLIN SODIUM 4.5 G: 500; 4 INJECTION, SOLUTION INTRAVENOUS at 16:45

## 2021-05-13 RX ADMIN — FENTANYL CITRATE 50 MCG: 50 INJECTION, SOLUTION INTRAMUSCULAR; INTRAVENOUS at 17:27

## 2021-05-13 RX ADMIN — SODIUM CHLORIDE 100 ML/HR: 9 INJECTION, SOLUTION INTRAVENOUS at 21:38

## 2021-05-13 RX ADMIN — ROCURONIUM BROMIDE 50 MG: 50 INJECTION INTRAVENOUS at 17:44

## 2021-05-13 RX ADMIN — FENTANYL CITRATE 50 MCG: 50 INJECTION, SOLUTION INTRAMUSCULAR; INTRAVENOUS at 19:20

## 2021-05-13 RX ADMIN — VANCOMYCIN HYDROCHLORIDE 2250 MG: 10 INJECTION, POWDER, LYOPHILIZED, FOR SOLUTION INTRAVENOUS at 14:56

## 2021-05-13 RX ADMIN — SODIUM CHLORIDE, POTASSIUM CHLORIDE, SODIUM LACTATE AND CALCIUM CHLORIDE 9 ML/HR: 600; 310; 30; 20 INJECTION, SOLUTION INTRAVENOUS at 17:28

## 2021-05-13 RX ADMIN — PHENYLEPHRINE HYDROCHLORIDE 200 MCG: 10 INJECTION INTRAVENOUS at 18:17

## 2021-05-13 RX ADMIN — HYDROMORPHONE HYDROCHLORIDE 0.5 MG: 1 INJECTION, SOLUTION INTRAMUSCULAR; INTRAVENOUS; SUBCUTANEOUS at 19:40

## 2021-05-13 RX ADMIN — HYDROMORPHONE HYDROCHLORIDE 0.5 MG: 1 INJECTION, SOLUTION INTRAMUSCULAR; INTRAVENOUS; SUBCUTANEOUS at 22:39

## 2021-05-13 RX ADMIN — FENTANYL CITRATE 100 MCG: 50 INJECTION INTRAMUSCULAR; INTRAVENOUS at 17:44

## 2021-05-13 RX ADMIN — OXYCODONE HYDROCHLORIDE AND ACETAMINOPHEN 1 TABLET: 5; 325 TABLET ORAL at 21:36

## 2021-05-13 RX ADMIN — ONDANSETRON 4 MG: 2 INJECTION INTRAMUSCULAR; INTRAVENOUS at 18:08

## 2021-05-13 RX ADMIN — PHENYLEPHRINE HYDROCHLORIDE 100 MCG: 10 INJECTION INTRAVENOUS at 17:53

## 2021-05-13 RX ADMIN — MIDAZOLAM 1 MG: 1 INJECTION INTRAMUSCULAR; INTRAVENOUS at 17:28

## 2021-05-13 NOTE — ANESTHESIA PROCEDURE NOTES
Airway  Urgency: elective    Date/Time: 5/13/2021 5:48 PM  Airway not difficult    General Information and Staff    Patient location during procedure: OR  Anesthesiologist: Kevin Lynn MD    Indications and Patient Condition  Indications for airway management: airway protection    Preoxygenated: yes  MILS maintained throughout  Mask difficulty assessment: 1 - vent by mask    Final Airway Details  Final airway type: endotracheal airway      Successful airway: ETT  Cuffed: yes   Successful intubation technique: direct laryngoscopy  Endotracheal tube insertion site: oral  Blade: Belia  Blade size: 3  ETT size (mm): 8.0  Cormack-Lehane Classification: grade I - full view of glottis  Placement verified by: chest auscultation and capnometry   Measured from: lips  ETT/EBT  to lips (cm): 23  Number of attempts at approach: 1  Assessment: lips, teeth, and gum same as pre-op and atraumatic intubation

## 2021-05-13 NOTE — ANESTHESIA PREPROCEDURE EVALUATION
Anesthesia Evaluation     Patient summary reviewed and Nursing notes reviewed   NPO Solid Status: > 8 hours  NPO Liquid Status: > 8 hours           Airway   Mallampati: II  TM distance: >3 FB  Neck ROM: full  No difficulty expected  Dental    (+) edentulous    Pulmonary - normal exam   (+) a smoker Current, sleep apnea on CPAP,   Cardiovascular - normal exam    ECG reviewed    (+) hypertension less than 2 medications, hyperlipidemia,     ROS comment: Good LV fxn, no CAD or valvular dz  by cath and ECHO    Neuro/Psych  (+) psychiatric history Anxiety and Depression,     GI/Hepatic/Renal/Endo    (+) obesity,  GERD,  diabetes mellitus type 2 poorly controlled using insulin,     Musculoskeletal     (+) back pain,       ROS comment: Myofascial pain  Abdominal   (+) obese,    Substance History      OB/GYN          Other                      Anesthesia Plan    ASA 3 - emergent     general     intravenous induction     Anesthetic plan, all risks, benefits, and alternatives have been provided, discussed and informed consent has been obtained with: patient.

## 2021-05-14 PROBLEM — K21.9 GERD WITHOUT ESOPHAGITIS: Status: ACTIVE | Noted: 2021-05-14

## 2021-05-14 PROBLEM — E87.20 LACTIC ACIDOSIS: Status: ACTIVE | Noted: 2021-05-14

## 2021-05-14 LAB
ANION GAP SERPL CALCULATED.3IONS-SCNC: 9.8 MMOL/L (ref 5–15)
BUN SERPL-MCNC: 8 MG/DL (ref 6–20)
BUN/CREAT SERPL: 18.2 (ref 7–25)
CALCIUM SPEC-SCNC: 8.4 MG/DL (ref 8.6–10.5)
CHLORIDE SERPL-SCNC: 106 MMOL/L (ref 98–107)
CO2 SERPL-SCNC: 24.2 MMOL/L (ref 22–29)
CREAT SERPL-MCNC: 0.44 MG/DL (ref 0.76–1.27)
DEPRECATED RDW RBC AUTO: 42.2 FL (ref 37–54)
ERYTHROCYTE [DISTWIDTH] IN BLOOD BY AUTOMATED COUNT: 13.4 % (ref 12.3–15.4)
GFR SERPL CREATININE-BSD FRML MDRD: >150 ML/MIN/1.73
GLUCOSE BLDC GLUCOMTR-MCNC: 233 MG/DL (ref 70–130)
GLUCOSE BLDC GLUCOMTR-MCNC: 240 MG/DL (ref 70–130)
GLUCOSE BLDC GLUCOMTR-MCNC: 273 MG/DL (ref 70–130)
GLUCOSE BLDC GLUCOMTR-MCNC: 314 MG/DL (ref 70–130)
GLUCOSE SERPL-MCNC: 210 MG/DL (ref 65–99)
HCT VFR BLD AUTO: 41.5 % (ref 37.5–51)
HGB BLD-MCNC: 13.9 G/DL (ref 13–17.7)
MCH RBC QN AUTO: 29 PG (ref 26.6–33)
MCHC RBC AUTO-ENTMCNC: 33.5 G/DL (ref 31.5–35.7)
MCV RBC AUTO: 86.5 FL (ref 79–97)
PLATELET # BLD AUTO: 264 10*3/MM3 (ref 140–450)
PMV BLD AUTO: 10.8 FL (ref 6–12)
POTASSIUM SERPL-SCNC: 3.8 MMOL/L (ref 3.5–5.2)
RBC # BLD AUTO: 4.8 10*6/MM3 (ref 4.14–5.8)
SODIUM SERPL-SCNC: 140 MMOL/L (ref 136–145)
WBC # BLD AUTO: 11.7 10*3/MM3 (ref 3.4–10.8)

## 2021-05-14 PROCEDURE — 25010000002 ENOXAPARIN PER 10 MG: Performed by: INTERNAL MEDICINE

## 2021-05-14 PROCEDURE — 80048 BASIC METABOLIC PNL TOTAL CA: CPT | Performed by: UROLOGY

## 2021-05-14 PROCEDURE — 25010000002 PIPERACILLIN SOD-TAZOBACTAM PER 1 G: Performed by: UROLOGY

## 2021-05-14 PROCEDURE — 63710000001 INSULIN LISPRO (HUMAN) PER 5 UNITS: Performed by: UROLOGY

## 2021-05-14 PROCEDURE — 85027 COMPLETE CBC AUTOMATED: CPT | Performed by: UROLOGY

## 2021-05-14 PROCEDURE — 82962 GLUCOSE BLOOD TEST: CPT

## 2021-05-14 PROCEDURE — 25010000002 VANCOMYCIN 10 G RECONSTITUTED SOLUTION: Performed by: UROLOGY

## 2021-05-14 PROCEDURE — 63710000001 INSULIN GLARGINE PER 5 UNITS: Performed by: STUDENT IN AN ORGANIZED HEALTH CARE EDUCATION/TRAINING PROGRAM

## 2021-05-14 PROCEDURE — 25010000002 HYDROMORPHONE PER 4 MG: Performed by: UROLOGY

## 2021-05-14 RX ORDER — INSULIN GLARGINE 100 [IU]/ML
20 INJECTION, SOLUTION SUBCUTANEOUS NIGHTLY
Status: DISCONTINUED | OUTPATIENT
Start: 2021-05-14 | End: 2021-05-15

## 2021-05-14 RX ORDER — BLOOD SUGAR DIAGNOSTIC
STRIP MISCELLANEOUS
Qty: 100 EACH | Refills: 12 | Status: CANCELLED | OUTPATIENT
Start: 2021-05-14

## 2021-05-14 RX ORDER — BLOOD-GLUCOSE METER
KIT MISCELLANEOUS
Qty: 1 EACH | Refills: 0 | Status: CANCELLED | OUTPATIENT
Start: 2021-05-14

## 2021-05-14 RX ORDER — LANCETS 30 GAUGE
EACH MISCELLANEOUS
Qty: 100 EACH | Refills: 0 | Status: CANCELLED | OUTPATIENT
Start: 2021-05-14

## 2021-05-14 RX ORDER — OXYCODONE HYDROCHLORIDE AND ACETAMINOPHEN 5; 325 MG/1; MG/1
2 TABLET ORAL EVERY 4 HOURS PRN
Status: DISCONTINUED | OUTPATIENT
Start: 2021-05-14 | End: 2021-05-17 | Stop reason: HOSPADM

## 2021-05-14 RX ORDER — INSULIN GLARGINE 100 [IU]/ML
20 INJECTION, SOLUTION SUBCUTANEOUS NIGHTLY
Status: DISCONTINUED | OUTPATIENT
Start: 2021-05-14 | End: 2021-05-14

## 2021-05-14 RX ADMIN — OXYCODONE HYDROCHLORIDE AND ACETAMINOPHEN 1 TABLET: 5; 325 TABLET ORAL at 09:18

## 2021-05-14 RX ADMIN — DOCUSATE SODIUM 50MG AND SENNOSIDES 8.6MG 2 TABLET: 8.6; 5 TABLET, FILM COATED ORAL at 08:21

## 2021-05-14 RX ADMIN — AMLODIPINE BESYLATE 10 MG: 10 TABLET ORAL at 08:22

## 2021-05-14 RX ADMIN — METOPROLOL TARTRATE 25 MG: 25 TABLET, FILM COATED ORAL at 22:00

## 2021-05-14 RX ADMIN — HYDROMORPHONE HYDROCHLORIDE 0.5 MG: 1 INJECTION, SOLUTION INTRAMUSCULAR; INTRAVENOUS; SUBCUTANEOUS at 01:35

## 2021-05-14 RX ADMIN — TAZOBACTAM SODIUM AND PIPERACILLIN SODIUM 3.38 G: 375; 3 INJECTION, SOLUTION INTRAVENOUS at 23:12

## 2021-05-14 RX ADMIN — TAZOBACTAM SODIUM AND PIPERACILLIN SODIUM 4.5 G: 500; 4 INJECTION, SOLUTION INTRAVENOUS at 14:06

## 2021-05-14 RX ADMIN — VANCOMYCIN HYDROCHLORIDE 1250 MG: 10 INJECTION, POWDER, LYOPHILIZED, FOR SOLUTION INTRAVENOUS at 12:06

## 2021-05-14 RX ADMIN — OXYCODONE HYDROCHLORIDE AND ACETAMINOPHEN 2 TABLET: 5; 325 TABLET ORAL at 22:00

## 2021-05-14 RX ADMIN — OXYCODONE HYDROCHLORIDE AND ACETAMINOPHEN 1 TABLET: 5; 325 TABLET ORAL at 02:34

## 2021-05-14 RX ADMIN — INSULIN LISPRO 4 UNITS: 100 INJECTION, SOLUTION INTRAVENOUS; SUBCUTANEOUS at 17:41

## 2021-05-14 RX ADMIN — OXYCODONE HYDROCHLORIDE AND ACETAMINOPHEN 2 TABLET: 5; 325 TABLET ORAL at 12:07

## 2021-05-14 RX ADMIN — TAZOBACTAM SODIUM AND PIPERACILLIN SODIUM 4.5 G: 500; 4 INJECTION, SOLUTION INTRAVENOUS at 06:58

## 2021-05-14 RX ADMIN — VANCOMYCIN HYDROCHLORIDE 1250 MG: 10 INJECTION, POWDER, LYOPHILIZED, FOR SOLUTION INTRAVENOUS at 02:06

## 2021-05-14 RX ADMIN — ATORVASTATIN CALCIUM 40 MG: 20 TABLET, FILM COATED ORAL at 08:21

## 2021-05-14 RX ADMIN — INSULIN LISPRO 4 UNITS: 100 INJECTION, SOLUTION INTRAVENOUS; SUBCUTANEOUS at 08:22

## 2021-05-14 RX ADMIN — INSULIN LISPRO 4 UNITS: 100 INJECTION, SOLUTION INTRAVENOUS; SUBCUTANEOUS at 12:06

## 2021-05-14 RX ADMIN — HYDROMORPHONE HYDROCHLORIDE 0.5 MG: 1 INJECTION, SOLUTION INTRAMUSCULAR; INTRAVENOUS; SUBCUTANEOUS at 22:55

## 2021-05-14 RX ADMIN — OXYCODONE HYDROCHLORIDE AND ACETAMINOPHEN 2 TABLET: 5; 325 TABLET ORAL at 16:36

## 2021-05-14 RX ADMIN — HYDROMORPHONE HYDROCHLORIDE 0.5 MG: 1 INJECTION, SOLUTION INTRAMUSCULAR; INTRAVENOUS; SUBCUTANEOUS at 19:53

## 2021-05-14 RX ADMIN — HYDROMORPHONE HYDROCHLORIDE 0.5 MG: 1 INJECTION, SOLUTION INTRAMUSCULAR; INTRAVENOUS; SUBCUTANEOUS at 14:06

## 2021-05-14 RX ADMIN — DOCUSATE SODIUM 50MG AND SENNOSIDES 8.6MG 2 TABLET: 8.6; 5 TABLET, FILM COATED ORAL at 22:00

## 2021-05-14 RX ADMIN — TAZOBACTAM SODIUM AND PIPERACILLIN SODIUM 4.5 G: 500; 4 INJECTION, SOLUTION INTRAVENOUS at 00:00

## 2021-05-14 RX ADMIN — Medication 3 MG: at 22:55

## 2021-05-14 RX ADMIN — HYDROMORPHONE HYDROCHLORIDE 0.5 MG: 1 INJECTION, SOLUTION INTRAMUSCULAR; INTRAVENOUS; SUBCUTANEOUS at 05:49

## 2021-05-14 RX ADMIN — LOSARTAN POTASSIUM 50 MG: 50 TABLET, FILM COATED ORAL at 08:21

## 2021-05-14 RX ADMIN — HYDROMORPHONE HYDROCHLORIDE 0.5 MG: 1 INJECTION, SOLUTION INTRAMUSCULAR; INTRAVENOUS; SUBCUTANEOUS at 08:22

## 2021-05-14 RX ADMIN — SODIUM CHLORIDE 100 ML/HR: 9 INJECTION, SOLUTION INTRAVENOUS at 07:39

## 2021-05-14 RX ADMIN — Medication 3 MG: at 00:14

## 2021-05-14 RX ADMIN — INSULIN GLARGINE 20 UNITS: 100 INJECTION, SOLUTION SUBCUTANEOUS at 22:03

## 2021-05-14 RX ADMIN — METOPROLOL TARTRATE 25 MG: 25 TABLET, FILM COATED ORAL at 08:22

## 2021-05-14 RX ADMIN — ENOXAPARIN SODIUM 40 MG: 40 INJECTION SUBCUTANEOUS at 22:00

## 2021-05-14 RX ADMIN — PANTOPRAZOLE SODIUM 40 MG: 40 TABLET, DELAYED RELEASE ORAL at 05:49

## 2021-05-14 NOTE — OUTREACH NOTE
Medical Week 2 Survey      Responses   [facility] patient discharged from?  [place]   Does the patient have one of the following disease processes/diagnoses(primary or secondary)?  Other   Week 2 attempt successful?  No   Revoke  Readmitted          Maria T Davis RN

## 2021-05-14 NOTE — ANESTHESIA POSTPROCEDURE EVALUATION
"Patient: Graham Meléndez    Procedure Summary     Date: 05/13/21 Room / Location: [facility] OR 03 / [facility] MAIN OR    Anesthesia Start: 1739 Anesthesia Stop: 1827    Procedure: INCISION AND DRAINAGE OF ARLIN GRANGRENE (N/A Scrotum) Diagnosis:     Surgeons: Carlos Lamb Jr., MD Provider: Kevin Lynn MD    Anesthesia Type: general ASA Status: 3 - Emergent          Anesthesia Type: general    Vitals  Vitals Value Taken Time   /70 05/13/21 2000   Temp 36.4 °C (97.5 °F) 05/13/21 1825   Pulse 76 05/13/21 2007   Resp 12 05/13/21 1945   SpO2 97 % 05/13/21 2007   Vitals shown include unvalidated device data.        Post Anesthesia Care and Evaluation    Pain management: adequate  Airway patency: patent  Anesthetic complications: No anesthetic complications    Cardiovascular status: acceptable  Respiratory status: acceptable  Hydration status: acceptable    Comments: /77   Pulse 83   Temp 36.4 °C (97.5 °F) (Oral)   Resp 12   Ht 182.9 cm (72\")   Wt 108 kg (237 lb)   SpO2 96%   BMI 32.14 kg/m²         "

## 2021-05-15 LAB
ANION GAP SERPL CALCULATED.3IONS-SCNC: 6.6 MMOL/L (ref 5–15)
BUN SERPL-MCNC: 3 MG/DL (ref 6–20)
BUN/CREAT SERPL: 5.2 (ref 7–25)
CALCIUM SPEC-SCNC: 8.2 MG/DL (ref 8.6–10.5)
CHLORIDE SERPL-SCNC: 105 MMOL/L (ref 98–107)
CO2 SERPL-SCNC: 24.4 MMOL/L (ref 22–29)
CREAT SERPL-MCNC: 0.58 MG/DL (ref 0.76–1.27)
DEPRECATED RDW RBC AUTO: 41.4 FL (ref 37–54)
ERYTHROCYTE [DISTWIDTH] IN BLOOD BY AUTOMATED COUNT: 13.1 % (ref 12.3–15.4)
GFR SERPL CREATININE-BSD FRML MDRD: 150 ML/MIN/1.73
GLUCOSE BLDC GLUCOMTR-MCNC: 255 MG/DL (ref 70–130)
GLUCOSE BLDC GLUCOMTR-MCNC: 268 MG/DL (ref 70–130)
GLUCOSE BLDC GLUCOMTR-MCNC: 326 MG/DL (ref 70–130)
GLUCOSE BLDC GLUCOMTR-MCNC: 395 MG/DL (ref 70–130)
GLUCOSE SERPL-MCNC: 323 MG/DL (ref 65–99)
HBA1C MFR BLD: 12.36 % (ref 4.8–5.6)
HCT VFR BLD AUTO: 42.5 % (ref 37.5–51)
HGB BLD-MCNC: 14.2 G/DL (ref 13–17.7)
MCH RBC QN AUTO: 29 PG (ref 26.6–33)
MCHC RBC AUTO-ENTMCNC: 33.4 G/DL (ref 31.5–35.7)
MCV RBC AUTO: 86.9 FL (ref 79–97)
PLATELET # BLD AUTO: 269 10*3/MM3 (ref 140–450)
PMV BLD AUTO: 10.7 FL (ref 6–12)
POTASSIUM SERPL-SCNC: 3.7 MMOL/L (ref 3.5–5.2)
RBC # BLD AUTO: 4.89 10*6/MM3 (ref 4.14–5.8)
SODIUM SERPL-SCNC: 136 MMOL/L (ref 136–145)
VANCOMYCIN TROUGH SERPL-MCNC: 5.5 MCG/ML (ref 5–20)
WBC # BLD AUTO: 8.63 10*3/MM3 (ref 3.4–10.8)

## 2021-05-15 PROCEDURE — 25010000002 ENOXAPARIN PER 10 MG: Performed by: INTERNAL MEDICINE

## 2021-05-15 PROCEDURE — 63710000001 INSULIN GLARGINE PER 5 UNITS: Performed by: STUDENT IN AN ORGANIZED HEALTH CARE EDUCATION/TRAINING PROGRAM

## 2021-05-15 PROCEDURE — 25010000002 VANCOMYCIN 10 G RECONSTITUTED SOLUTION: Performed by: UROLOGY

## 2021-05-15 PROCEDURE — 80202 ASSAY OF VANCOMYCIN: CPT | Performed by: UROLOGY

## 2021-05-15 PROCEDURE — 25010000002 PIPERACILLIN SOD-TAZOBACTAM PER 1 G: Performed by: STUDENT IN AN ORGANIZED HEALTH CARE EDUCATION/TRAINING PROGRAM

## 2021-05-15 PROCEDURE — 85027 COMPLETE CBC AUTOMATED: CPT | Performed by: UROLOGY

## 2021-05-15 PROCEDURE — 83036 HEMOGLOBIN GLYCOSYLATED A1C: CPT | Performed by: STUDENT IN AN ORGANIZED HEALTH CARE EDUCATION/TRAINING PROGRAM

## 2021-05-15 PROCEDURE — 80048 BASIC METABOLIC PNL TOTAL CA: CPT | Performed by: UROLOGY

## 2021-05-15 PROCEDURE — 25010000002 VANCOMYCIN 10 G RECONSTITUTED SOLUTION: Performed by: STUDENT IN AN ORGANIZED HEALTH CARE EDUCATION/TRAINING PROGRAM

## 2021-05-15 PROCEDURE — 82962 GLUCOSE BLOOD TEST: CPT

## 2021-05-15 PROCEDURE — 63710000001 INSULIN LISPRO (HUMAN) PER 5 UNITS: Performed by: UROLOGY

## 2021-05-15 PROCEDURE — 25010000002 PIPERACILLIN SOD-TAZOBACTAM PER 1 G: Performed by: UROLOGY

## 2021-05-15 PROCEDURE — 25010000002 HYDROMORPHONE PER 4 MG: Performed by: UROLOGY

## 2021-05-15 RX ORDER — INSULIN GLARGINE 100 [IU]/ML
25 INJECTION, SOLUTION SUBCUTANEOUS NIGHTLY
Status: DISCONTINUED | OUTPATIENT
Start: 2021-05-15 | End: 2021-05-16

## 2021-05-15 RX ADMIN — HYDROMORPHONE HYDROCHLORIDE 0.5 MG: 1 INJECTION, SOLUTION INTRAMUSCULAR; INTRAVENOUS; SUBCUTANEOUS at 09:03

## 2021-05-15 RX ADMIN — HYDROMORPHONE HYDROCHLORIDE 0.5 MG: 1 INJECTION, SOLUTION INTRAMUSCULAR; INTRAVENOUS; SUBCUTANEOUS at 23:29

## 2021-05-15 RX ADMIN — SODIUM CHLORIDE 100 ML/HR: 9 INJECTION, SOLUTION INTRAVENOUS at 04:16

## 2021-05-15 RX ADMIN — OXYCODONE HYDROCHLORIDE AND ACETAMINOPHEN 2 TABLET: 5; 325 TABLET ORAL at 05:08

## 2021-05-15 RX ADMIN — OXYCODONE HYDROCHLORIDE AND ACETAMINOPHEN 2 TABLET: 5; 325 TABLET ORAL at 19:37

## 2021-05-15 RX ADMIN — DOCUSATE SODIUM 50MG AND SENNOSIDES 8.6MG 2 TABLET: 8.6; 5 TABLET, FILM COATED ORAL at 08:07

## 2021-05-15 RX ADMIN — LOSARTAN POTASSIUM 50 MG: 50 TABLET, FILM COATED ORAL at 08:07

## 2021-05-15 RX ADMIN — VANCOMYCIN HYDROCHLORIDE 1250 MG: 10 INJECTION, POWDER, LYOPHILIZED, FOR SOLUTION INTRAVENOUS at 01:06

## 2021-05-15 RX ADMIN — ENOXAPARIN SODIUM 40 MG: 40 INJECTION SUBCUTANEOUS at 21:23

## 2021-05-15 RX ADMIN — INSULIN GLARGINE 25 UNITS: 100 INJECTION, SOLUTION SUBCUTANEOUS at 21:23

## 2021-05-15 RX ADMIN — PANTOPRAZOLE SODIUM 40 MG: 40 TABLET, DELAYED RELEASE ORAL at 06:13

## 2021-05-15 RX ADMIN — SODIUM CHLORIDE 100 ML/HR: 9 INJECTION, SOLUTION INTRAVENOUS at 23:26

## 2021-05-15 RX ADMIN — TAZOBACTAM SODIUM AND PIPERACILLIN SODIUM 3.38 G: 375; 3 INJECTION, SOLUTION INTRAVENOUS at 23:26

## 2021-05-15 RX ADMIN — HYDROMORPHONE HYDROCHLORIDE 0.5 MG: 1 INJECTION, SOLUTION INTRAMUSCULAR; INTRAVENOUS; SUBCUTANEOUS at 21:23

## 2021-05-15 RX ADMIN — HYDROMORPHONE HYDROCHLORIDE 0.5 MG: 1 INJECTION, SOLUTION INTRAMUSCULAR; INTRAVENOUS; SUBCUTANEOUS at 11:23

## 2021-05-15 RX ADMIN — INSULIN LISPRO 8 UNITS: 100 INJECTION, SOLUTION INTRAVENOUS; SUBCUTANEOUS at 16:55

## 2021-05-15 RX ADMIN — HYDROMORPHONE HYDROCHLORIDE 0.5 MG: 1 INJECTION, SOLUTION INTRAMUSCULAR; INTRAVENOUS; SUBCUTANEOUS at 14:50

## 2021-05-15 RX ADMIN — SODIUM CHLORIDE 100 ML/HR: 9 INJECTION, SOLUTION INTRAVENOUS at 14:21

## 2021-05-15 RX ADMIN — AMLODIPINE BESYLATE 10 MG: 10 TABLET ORAL at 08:07

## 2021-05-15 RX ADMIN — OXYCODONE HYDROCHLORIDE AND ACETAMINOPHEN 2 TABLET: 5; 325 TABLET ORAL at 12:33

## 2021-05-15 RX ADMIN — INSULIN LISPRO 7 UNITS: 100 INJECTION, SOLUTION INTRAVENOUS; SUBCUTANEOUS at 08:07

## 2021-05-15 RX ADMIN — HYDROMORPHONE HYDROCHLORIDE 0.5 MG: 1 INJECTION, SOLUTION INTRAMUSCULAR; INTRAVENOUS; SUBCUTANEOUS at 17:09

## 2021-05-15 RX ADMIN — METOPROLOL TARTRATE 25 MG: 25 TABLET, FILM COATED ORAL at 08:08

## 2021-05-15 RX ADMIN — HYDROMORPHONE HYDROCHLORIDE 0.5 MG: 1 INJECTION, SOLUTION INTRAMUSCULAR; INTRAVENOUS; SUBCUTANEOUS at 06:57

## 2021-05-15 RX ADMIN — INSULIN LISPRO 6 UNITS: 100 INJECTION, SOLUTION INTRAVENOUS; SUBCUTANEOUS at 11:23

## 2021-05-15 RX ADMIN — DOCUSATE SODIUM 50MG AND SENNOSIDES 8.6MG 2 TABLET: 8.6; 5 TABLET, FILM COATED ORAL at 21:23

## 2021-05-15 RX ADMIN — ATORVASTATIN CALCIUM 40 MG: 20 TABLET, FILM COATED ORAL at 08:07

## 2021-05-15 RX ADMIN — TAZOBACTAM SODIUM AND PIPERACILLIN SODIUM 3.38 G: 375; 3 INJECTION, SOLUTION INTRAVENOUS at 14:21

## 2021-05-15 RX ADMIN — VANCOMYCIN HYDROCHLORIDE 1750 MG: 10 INJECTION, POWDER, LYOPHILIZED, FOR SOLUTION INTRAVENOUS at 16:55

## 2021-05-15 RX ADMIN — HYDROMORPHONE HYDROCHLORIDE 0.5 MG: 1 INJECTION, SOLUTION INTRAMUSCULAR; INTRAVENOUS; SUBCUTANEOUS at 02:49

## 2021-05-15 RX ADMIN — TAZOBACTAM SODIUM AND PIPERACILLIN SODIUM 3.38 G: 375; 3 INJECTION, SOLUTION INTRAVENOUS at 06:13

## 2021-05-15 RX ADMIN — METOPROLOL TARTRATE 25 MG: 25 TABLET, FILM COATED ORAL at 21:23

## 2021-05-15 NOTE — PLAN OF CARE
Goal Outcome Evaluation:     Progress: no change  Outcome Summary: Patient alert and oriented, up with stand by assist. IV Vanc and Zosyn given. Dressing change completed. Tx pain throughout this night shift with Dilaudid x2 and PO pain medication x1. Pt tolerating diet. Will continue to monitor for pain and discomfort.    Problem: Adult Inpatient Plan of Care  Goal: Patient-Specific Goal (Individualized)  5/15/2021 0408 by Maggie Hill, RN  Flowsheets  Taken 5/15/2021 0408 by Maggie Hill, GAVIOTA  Anxieties, Fears or Concerns: Prolonged healing  Taken 5/14/2021 0351 by Sonya Tamayo RN  Patient-Specific Goals (Include Timeframe): to feel better  Individualized Care Needs: PRN meds, scheduled meds, IV fluids and antibotics, urinal advance diet as tolerated  5/15/2021 0405 by Maggie Hill, RN  Outcome: Ongoing, Progressing

## 2021-05-15 NOTE — PROGRESS NOTES
"Pharmacokinetic Consult - Vancomycin Dosing (Follow-up Note)    Graham Meléndez is on day 3 pharmacy to dose vancomycin for SSTI.  Pharmacy dosing vancomycin per Dr Lamb's request. ID (Momo) following  Goal AUC= 400-600   Duration of therapy: 3 days  Is Patient on Dialysis or Renal Replacement: No    Relevant clinical data and objective history reviewed:  50 y.o. male 182.9 cm (72\") 109 kg (239 lb 3.2 oz)    Creatinine   Date Value Ref Range Status   05/15/2021 0.58 (L) 0.76 - 1.27 mg/dL Final   05/14/2021 0.44 (L) 0.76 - 1.27 mg/dL Final   05/13/2021 0.63 (L) 0.76 - 1.27 mg/dL Final     BUN   Date Value Ref Range Status   05/15/2021 3 (L) 6 - 20 mg/dL Final     Estimated Creatinine Clearance: 198.7 mL/min (A) (by C-G formula based on SCr of 0.58 mg/dL (L)).    Lab Results   Component Value Date    WBC 8.63 05/15/2021     Temp Readings from Last 3 Encounters:   05/15/21 100.2 °F (37.9 °C) (Oral)   05/05/21 98.4 °F (36.9 °C) (Oral)   11/27/20 97.3 °F (36.3 °C) (Tympanic)     Cultures: pending       Anti-Infectives (From admission, onward)      Ordered     Dose/Rate Route Frequency Start Stop    05/15/21 1318  vancomycin 1750 mg/500 mL 0.9% NS IVPB ([facility])     Ordering Provider: Carlos Lamb Jr., MD    1,750 mg  over 75 Minutes Intravenous Every 12 Hours 05/15/21 1400 05/16/21 1359    05/14/21 1533  piperacillin-tazobactam (ZOSYN) 3.375 g in iso-osmotic dextrose 50 ml (premix)     Ordering Provider: Carlos Lamb Jr., MD    3.375 g  over 4 Hours Intravenous Every 8 Hours 05/14/21 2300 05/16/21 2259    05/13/21 1821  Pharmacy to dose vancomycin     Ordering Provider: Carlos Lamb Jr., MD     Does not apply Continuous PRN 05/13/21 1820 05/16/21 1819    05/13/21 1521  piperacillin-tazobactam (ZOSYN) 4.5 g in iso-osmotic dextrose 100 mL IVPB (premix)     Ordering Provider: Ra Wu MD    4.5 g Intravenous Once 05/13/21 1523 05/13/21 1645    05/13/21 1329  cefTRIAXone (ROCEPHIN) IVPB 1 g   "   Ordering Provider: Ra Wu MD    1 g  100 mL/hr over 30 Minutes Intravenous Once 05/13/21 1331 05/13/21 1455    05/13/21 1328  vancomycin 2250 mg/500 mL 0.9% NS IVPB ([facility])     Ordering Provider: Ra Wu MD    20 mg/kg × 108 kg Intravenous Once 05/13/21 1330 05/13/21 1456           Lab Results   Component Value Date    VANCOTROUGH 5.50 05/15/2021     No results found for: VANCORANDOM    Dosing History      Assessment/Plan  Level is subtherapeutic, will increase dose.  Using Bayesian analysis via [employer] software a dosing regimen of vancomycin 1750mg Q12H has a predicted steady-state trough of 10.5mg/L and an AUC24 of 447mg*h/L.  AUC24 is a stronger predictor of vancomycin efficacy and thus will be targeted in place of trough levels to best balance efficacy and toxicity.  - Increase vancomycin dose to 1750mg IV Q12h  -Vancomycin level not ordered as last dose will be tomorrow at 2am.  Pharmacy will continue to follow daily while on vancomycin and adjust as needed.     Regis KayeD

## 2021-05-15 NOTE — PLAN OF CARE
Goal Outcome Evaluation:     Progress: no change  Outcome Summary: Medicated with Dilaudid 0.5 x4 and Percocet x1. Dressing changed. Scheduled meds given. Tolerating diet.

## 2021-05-15 NOTE — PROGRESS NOTES
Name: Graham Meléndez ADMIT: 2021   : 1970  PCP: Reyes, Rosenberg Acosta, MD    MRN: [id number] LOS: 2 days   AGE/SEX: 50 y.o. male  ROOM: [facility]     Subjective   Subjective     Pain is better controlled today.        Objective   Objective   Vital Signs  Temp:  [97.3 °F (36.3 °C)-100.2 °F (37.9 °C)] 100.2 °F (37.9 °C)  Heart Rate:  [73-81] 81  Resp:  [16-18] 18  BP: (117-145)/(67-89) 145/86  SpO2:  [97 %-99 %] 99 %  on   ;   Device (Oxygen Therapy): room air  Body mass index is 32.44 kg/m².  Physical Exam  Constitutional:       General: He is not in acute distress.  HENT:      Head: Normocephalic and atraumatic.   Cardiovascular:      Rate and Rhythm: Normal rate and regular rhythm.      Heart sounds: Normal heart sounds.   Pulmonary:      Effort: Pulmonary effort is normal.      Breath sounds: Normal breath sounds.   Abdominal:      General: Bowel sounds are normal. There is no distension.      Palpations: Abdomen is soft.      Tenderness: There is no abdominal tenderness. There is no guarding or rebound.   Genitourinary:     Comments: Soft nontender left scrotal perineal area dressed  Musculoskeletal:         General: No tenderness.      Right lower leg: No edema.      Left lower leg: No edema.   Neurological:      Mental Status: He is alert and oriented to person, place, and time.   Psychiatric:         Mood and Affect: Mood normal.         Behavior: Behavior normal.         Results Review     I reviewed the patient's new clinical results.  Results from last 7 days   Lab Units 05/15/21  0621  0621  1333   WBC 10*3/mm3 8.63 11.70* 14.19*   HEMOGLOBIN g/dL 14.2 13.9 15.8   PLATELETS 10*3/mm3 269 264 342     Results from last 7 days   Lab Units 05/15/21  0621  0605 21  1334   SODIUM mmol/L 136 140 135*   POTASSIUM mmol/L 3.7 3.8 4.3   CHLORIDE mmol/L 105 106 98   CO2 mmol/L 24.4 24.2 25.6   BUN mg/dL 3* 8 11   CREATININE mg/dL 0.58* 0.44* 0.63*   GLUCOSE mg/dL 323* 210*  344*   Estimated Creatinine Clearance: 198.7 mL/min (A) (by C-G formula based on SCr of 0.58 mg/dL (L)).  Results from last 7 days   Lab Units 05/13/21  1334   ALBUMIN g/dL 3.80   BILIRUBIN mg/dL 0.3   ALK PHOS U/L 91   AST (SGOT) U/L 9   ALT (SGPT) U/L 7     Results from last 7 days   Lab Units 05/15/21  0627 05/14/21  0605 05/13/21  1334   CALCIUM mg/dL 8.2* 8.4* 9.5   ALBUMIN g/dL  --   --  3.80     Results from last 7 days   Lab Units 05/13/21  1645 05/13/21  1333   LACTATE mmol/L 1.8 2.5*     COVID19   Date Value Ref Range Status   05/13/2021 Not Detected Not Detected - Ref. Range Final   05/05/2021 Not Detected Not Detected - Ref. Range Final     Hemoglobin A1C   Date/Time Value Ref Range Status   05/15/2021 0627 12.36 (H) 4.80 - 5.60 % Final     Glucose   Date/Time Value Ref Range Status   05/15/2021 1113 268 (H) 70 - 130 mg/dL Final   05/15/2021 0628 326 (H) 70 - 130 mg/dL Final   05/14/2021 2120 273 (H) 70 - 130 mg/dL Final   05/14/2021 1645 314 (H) 70 - 130 mg/dL Final   05/14/2021 1145 240 (H) 70 - 130 mg/dL Final   05/14/2021 0544 233 (H) 70 - 130 mg/dL Final   05/13/2021 2045 188 (H) 70 - 130 mg/dL Final       US Scrotum & Testicles with doppler  Narrative: EXAMINATION: SCROTAL SONOGRAPHY WITH TESTICULAR DOPPLER     HISTORY: 50-year-old male with a history of left testicular pain for a  couple of days with swelling.     FINDINGS: Scrotal sonography with testicular Doppler was performed. The  right testicle measures 4.2 x 3.3 x 1.9 cm and the left testicle  measures 4.1 x 2.6 x 1.8 cm. Normal vascularity and echotexture seen in  both testes. Both epididymides have a normal appearance.     Posterior and inferior in the left hemiscrotum there is a 2.8 x 2.3 x  2.8 cm multifocal echogenic region that extends into the skin and is  most consistent with presence of an abscess with internal gas.     Impression: 1. There is a 2.8 cm gas-containing region in the posterior inferior  left hemiscrotum that is most  consistent with an abscess. This may  represent necrotizing fasciitis of the scrotum, also known as Rene's  gangrene. Surgical evaluation and further evaluation with a CT of the  pelvis is recommended.  2. Normal sonographic appearance of both testes.     This was communicated to Dr. Ra Wu in the emergency room on  05/13/2021 at 2:18 PM.     This report was finalized on 5/13/2021 4:04 PM by Dr. Jonas Candelario M.D.     CT Pelvis With Contrast  Narrative: CT PELVIS WITH IV CONTRAST     HISTORY: Diabetes with testicular and scrotal pain, concern for abscess     TECHNIQUE: Radiation dose reduction techniques were utilized, including  automated exposure control and exposure modulation based on body size.   3 mm images were obtained through the pelvis after the administration of  IV contrast.      COMPARISON: Testicular sonogram 05/13/2021     Impression: FINDINGS AND IMPRESSION:     No adenopathy by size criteria is present within the visualized pelvis.     There is colonic diverticulosis. The appendix is unremarkable. The  bladder has an unremarkable postcontrast CT appearance for its degree of  distention.     No suspicious lytic or blastic osseous lesions are present.     Soft tissue thickening and fat stranding is present within the superior  aspect of the left scrotum with extension into the perineum posteriorly.  Soft tissue thickening and fat stranding extends inferiorly into the  scrotum with discrete soft tissue gas visualized within the scrotum.  Ill-defined fluid layers posterior inferiorly within the scrotum and is  concerning for developing abscess. I confirmed with the ER physician  that the patient has not undergone a recent incision and drainage.  Findings indicate Fourniers gangrene. Urologic consultation is  recommended. The above findings were discussed with Dr. Wu by  telephone by Kiko Edwards at 2:53 PM on   05/13/2021 .     This report was finalized on 5/13/2021 3:03 PM by   Kiko Edwards M.D.       Scheduled Medications  amLODIPine, 10 mg, Oral, Daily  atorvastatin, 40 mg, Oral, Daily  enoxaparin, 40 mg, Subcutaneous, Nightly  insulin glargine, 25 Units, Subcutaneous, Nightly  insulin lispro, 0-9 Units, Subcutaneous, TID AC  losartan, 50 mg, Oral, Q24H  metoprolol tartrate, 25 mg, Oral, BID  pantoprazole, 40 mg, Oral, QAM  piperacillin-tazobactam, 3.375 g, Intravenous, Q8H  senna-docusate sodium, 2 tablet, Oral, BID  vancomycin, 1,750 mg, Intravenous, Q12H    Infusions  lactated ringers, 9 mL/hr, Last Rate: Stopped (05/13/21 2136)  Pharmacy to dose vancomycin,   sodium chloride, 100 mL/hr, Last Rate: 100 mL/hr (05/15/21 1421)    Diet  Diet Regular; Consistent Carbohydrate       Assessment/Plan     Active Hospital Problems    Diagnosis  POA   • **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       50 y.o. 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    Rene's gangrene status post I&D on 5/13/2021-ID and urology were consulted.  Patient is on IV Zosyn and vancomycin.  Cultures without growth to date    Lactic acidosis-resolved    Type 2 diabetes with hyperglycemia-a1c 12.36. still uncontrolled. Increase lantus dose to 25 units.    Essential hypertension-BP adequately controlled on current regimen    Hyperlipidemia  GERD  Obesity  DERIC  History of recurrent scrotal/inguinal abscess status post drainage in 2012      Pharmacy to dose Lovenox for DVT prophylaxis.   Full code.  Discussed with patient.  Anticipate discharge TBKINSEY Osorio MD  [facility]  05/15/21  15:56 EDT    I wore protective equipment throughout this patient encounter including a face mask, gloves  and protective eyewear.  Hand hygiene was performed before donning protective equipment and after removal when leaving the room.

## 2021-05-15 NOTE — PROGRESS NOTES
CC: I have some pain still    POD 2 I&D Scrotum Rene's Gangrene    Patient resting in bed  NAD  A&O x 3  Tolerating pain  Voiding spontaneously    Incision - packing removed, pink tissue with some granulation, slight bleeding  Remains on Vanc and Zosyn    Anaerobic culture - in process    WBC 8.6  Hgb 14.3  Cr 0.44    Plan:  Continue BID dressing changes  Continue abx per ID  Patient to ambulate  Will follow

## 2021-05-16 LAB
ANION GAP SERPL CALCULATED.3IONS-SCNC: 7.1 MMOL/L (ref 5–15)
BUN SERPL-MCNC: 5 MG/DL (ref 6–20)
BUN/CREAT SERPL: 10 (ref 7–25)
CALCIUM SPEC-SCNC: 8.1 MG/DL (ref 8.6–10.5)
CHLORIDE SERPL-SCNC: 108 MMOL/L (ref 98–107)
CO2 SERPL-SCNC: 23.9 MMOL/L (ref 22–29)
CREAT SERPL-MCNC: 0.5 MG/DL (ref 0.76–1.27)
DEPRECATED RDW RBC AUTO: 41.8 FL (ref 37–54)
ERYTHROCYTE [DISTWIDTH] IN BLOOD BY AUTOMATED COUNT: 13.4 % (ref 12.3–15.4)
GFR SERPL CREATININE-BSD FRML MDRD: >150 ML/MIN/1.73
GLUCOSE BLDC GLUCOMTR-MCNC: 209 MG/DL (ref 70–130)
GLUCOSE BLDC GLUCOMTR-MCNC: 259 MG/DL (ref 70–130)
GLUCOSE BLDC GLUCOMTR-MCNC: 289 MG/DL (ref 70–130)
GLUCOSE BLDC GLUCOMTR-MCNC: 336 MG/DL (ref 70–130)
GLUCOSE SERPL-MCNC: 290 MG/DL (ref 65–99)
HCT VFR BLD AUTO: 37.4 % (ref 37.5–51)
HGB BLD-MCNC: 12.3 G/DL (ref 13–17.7)
MCH RBC QN AUTO: 28.3 PG (ref 26.6–33)
MCHC RBC AUTO-ENTMCNC: 32.9 G/DL (ref 31.5–35.7)
MCV RBC AUTO: 86 FL (ref 79–97)
PLATELET # BLD AUTO: 281 10*3/MM3 (ref 140–450)
PMV BLD AUTO: 10.5 FL (ref 6–12)
POTASSIUM SERPL-SCNC: 3.6 MMOL/L (ref 3.5–5.2)
RBC # BLD AUTO: 4.35 10*6/MM3 (ref 4.14–5.8)
SODIUM SERPL-SCNC: 139 MMOL/L (ref 136–145)
WBC # BLD AUTO: 8.42 10*3/MM3 (ref 3.4–10.8)

## 2021-05-16 PROCEDURE — 80048 BASIC METABOLIC PNL TOTAL CA: CPT | Performed by: UROLOGY

## 2021-05-16 PROCEDURE — 85027 COMPLETE CBC AUTOMATED: CPT | Performed by: UROLOGY

## 2021-05-16 PROCEDURE — 63710000001 INSULIN LISPRO (HUMAN) PER 5 UNITS: Performed by: UROLOGY

## 2021-05-16 PROCEDURE — 25010000002 HYDROMORPHONE PER 4 MG: Performed by: UROLOGY

## 2021-05-16 PROCEDURE — 25010000002 VANCOMYCIN 10 G RECONSTITUTED SOLUTION: Performed by: STUDENT IN AN ORGANIZED HEALTH CARE EDUCATION/TRAINING PROGRAM

## 2021-05-16 PROCEDURE — 25010000002 PIPERACILLIN SOD-TAZOBACTAM PER 1 G: Performed by: STUDENT IN AN ORGANIZED HEALTH CARE EDUCATION/TRAINING PROGRAM

## 2021-05-16 PROCEDURE — 82962 GLUCOSE BLOOD TEST: CPT

## 2021-05-16 PROCEDURE — 25010000002 HYDROMORPHONE PER 4 MG: Performed by: NURSE PRACTITIONER

## 2021-05-16 PROCEDURE — 63710000001 INSULIN GLARGINE PER 5 UNITS: Performed by: STUDENT IN AN ORGANIZED HEALTH CARE EDUCATION/TRAINING PROGRAM

## 2021-05-16 PROCEDURE — 25010000002 ENOXAPARIN PER 10 MG: Performed by: INTERNAL MEDICINE

## 2021-05-16 RX ORDER — INSULIN GLARGINE 100 [IU]/ML
30 INJECTION, SOLUTION SUBCUTANEOUS NIGHTLY
Status: DISCONTINUED | OUTPATIENT
Start: 2021-05-16 | End: 2021-05-17 | Stop reason: HOSPADM

## 2021-05-16 RX ORDER — HYDROMORPHONE HCL 110MG/55ML
0.5 PATIENT CONTROLLED ANALGESIA SYRINGE INTRAVENOUS EVERY 4 HOURS PRN
Status: DISCONTINUED | OUTPATIENT
Start: 2021-05-16 | End: 2021-05-17 | Stop reason: HOSPADM

## 2021-05-16 RX ADMIN — INSULIN LISPRO 6 UNITS: 100 INJECTION, SOLUTION INTRAVENOUS; SUBCUTANEOUS at 13:48

## 2021-05-16 RX ADMIN — TAZOBACTAM SODIUM AND PIPERACILLIN SODIUM 3.38 G: 375; 3 INJECTION, SOLUTION INTRAVENOUS at 16:43

## 2021-05-16 RX ADMIN — ATORVASTATIN CALCIUM 40 MG: 20 TABLET, FILM COATED ORAL at 09:18

## 2021-05-16 RX ADMIN — HYDROMORPHONE HYDROCHLORIDE 0.5 MG: 1 INJECTION, SOLUTION INTRAMUSCULAR; INTRAVENOUS; SUBCUTANEOUS at 09:18

## 2021-05-16 RX ADMIN — VANCOMYCIN HYDROCHLORIDE 1750 MG: 10 INJECTION, POWDER, LYOPHILIZED, FOR SOLUTION INTRAVENOUS at 15:24

## 2021-05-16 RX ADMIN — OXYCODONE HYDROCHLORIDE AND ACETAMINOPHEN 2 TABLET: 5; 325 TABLET ORAL at 22:59

## 2021-05-16 RX ADMIN — TAZOBACTAM SODIUM AND PIPERACILLIN SODIUM 3.38 G: 375; 3 INJECTION, SOLUTION INTRAVENOUS at 07:37

## 2021-05-16 RX ADMIN — OXYCODONE HYDROCHLORIDE AND ACETAMINOPHEN 2 TABLET: 5; 325 TABLET ORAL at 01:23

## 2021-05-16 RX ADMIN — OXYCODONE HYDROCHLORIDE AND ACETAMINOPHEN 2 TABLET: 5; 325 TABLET ORAL at 07:37

## 2021-05-16 RX ADMIN — SODIUM CHLORIDE 100 ML/HR: 9 INJECTION, SOLUTION INTRAVENOUS at 09:18

## 2021-05-16 RX ADMIN — PANTOPRAZOLE SODIUM 40 MG: 40 TABLET, DELAYED RELEASE ORAL at 07:37

## 2021-05-16 RX ADMIN — INSULIN LISPRO 7 UNITS: 100 INJECTION, SOLUTION INTRAVENOUS; SUBCUTANEOUS at 16:43

## 2021-05-16 RX ADMIN — INSULIN GLARGINE 30 UNITS: 100 INJECTION, SOLUTION SUBCUTANEOUS at 21:37

## 2021-05-16 RX ADMIN — OXYCODONE HYDROCHLORIDE AND ACETAMINOPHEN 2 TABLET: 5; 325 TABLET ORAL at 13:45

## 2021-05-16 RX ADMIN — DOCUSATE SODIUM 50MG AND SENNOSIDES 8.6MG 2 TABLET: 8.6; 5 TABLET, FILM COATED ORAL at 09:18

## 2021-05-16 RX ADMIN — VANCOMYCIN HYDROCHLORIDE 1750 MG: 10 INJECTION, POWDER, LYOPHILIZED, FOR SOLUTION INTRAVENOUS at 01:23

## 2021-05-16 RX ADMIN — HYDROMORPHONE HYDROCHLORIDE 0.5 MG: 1 INJECTION, SOLUTION INTRAMUSCULAR; INTRAVENOUS; SUBCUTANEOUS at 15:23

## 2021-05-16 RX ADMIN — METOPROLOL TARTRATE 25 MG: 25 TABLET, FILM COATED ORAL at 21:26

## 2021-05-16 RX ADMIN — HYDROMORPHONE HYDROCHLORIDE 0.5 MG: 2 INJECTION, SOLUTION INTRAMUSCULAR; INTRAVENOUS; SUBCUTANEOUS at 21:26

## 2021-05-16 RX ADMIN — INSULIN LISPRO 6 UNITS: 100 INJECTION, SOLUTION INTRAVENOUS; SUBCUTANEOUS at 09:18

## 2021-05-16 RX ADMIN — Medication 3 MG: at 01:23

## 2021-05-16 RX ADMIN — TAZOBACTAM SODIUM AND PIPERACILLIN SODIUM 3.38 G: 375; 3 INJECTION, SOLUTION INTRAVENOUS at 22:59

## 2021-05-16 RX ADMIN — HYDROMORPHONE HYDROCHLORIDE 0.5 MG: 1 INJECTION, SOLUTION INTRAMUSCULAR; INTRAVENOUS; SUBCUTANEOUS at 11:17

## 2021-05-16 RX ADMIN — DOCUSATE SODIUM 50MG AND SENNOSIDES 8.6MG 2 TABLET: 8.6; 5 TABLET, FILM COATED ORAL at 21:26

## 2021-05-16 RX ADMIN — AMLODIPINE BESYLATE 10 MG: 10 TABLET ORAL at 09:18

## 2021-05-16 RX ADMIN — LOSARTAN POTASSIUM 50 MG: 50 TABLET, FILM COATED ORAL at 09:18

## 2021-05-16 RX ADMIN — METOPROLOL TARTRATE 25 MG: 25 TABLET, FILM COATED ORAL at 09:18

## 2021-05-16 RX ADMIN — HYDROMORPHONE HYDROCHLORIDE 0.5 MG: 1 INJECTION, SOLUTION INTRAMUSCULAR; INTRAVENOUS; SUBCUTANEOUS at 05:03

## 2021-05-16 RX ADMIN — OXYCODONE HYDROCHLORIDE AND ACETAMINOPHEN 2 TABLET: 5; 325 TABLET ORAL at 18:52

## 2021-05-16 RX ADMIN — ENOXAPARIN SODIUM 40 MG: 40 INJECTION SUBCUTANEOUS at 21:26

## 2021-05-16 NOTE — PROGRESS NOTES
"  Infectious Diseases Progress Note    Mauricio Barr MD     [facility]  Los: 2 days  Patient Identification:  Name: Graham Meléndez  Age: 50 y.o.  Sex: male  :  1970  MRN: [id number]         Primary Care Physician: Reyes, Rosenberg Acosta, MD            Subjective: Continues to do well.  Denies any fever and chills.  Decreased pain and discomfort in the surgical area.  Interval History: See consultation note.    Objective:    Scheduled Meds:amLODIPine, 10 mg, Oral, Daily  atorvastatin, 40 mg, Oral, Daily  enoxaparin, 40 mg, Subcutaneous, Nightly  insulin glargine, 25 Units, Subcutaneous, Nightly  insulin lispro, 0-9 Units, Subcutaneous, TID AC  losartan, 50 mg, Oral, Q24H  metoprolol tartrate, 25 mg, Oral, BID  pantoprazole, 40 mg, Oral, QAM  piperacillin-tazobactam, 3.375 g, Intravenous, Q8H  senna-docusate sodium, 2 tablet, Oral, BID  vancomycin, 1,750 mg, Intravenous, Q12H      Continuous Infusions:lactated ringers, 9 mL/hr, Last Rate: Stopped (21)  Pharmacy to dose vancomycin,   sodium chloride, 100 mL/hr, Last Rate: 100 mL/hr (05/15/21 142)        Vital signs in last 24 hours:  Temp:  [97.3 °F (36.3 °C)-100.2 °F (37.9 °C)] 100.2 °F (37.9 °C)  Heart Rate:  [73-81] 81  Resp:  [16-18] 18  BP: (117-145)/(67-89) 145/86    Intake/Output:    Intake/Output Summary (Last 24 hours) at 5/15/2021 2038  Last data filed at 5/15/2021 1800  Gross per 24 hour   Intake 2580 ml   Output 4000 ml   Net -1420 ml       Exam:  /86 (BP Location: Left arm, Patient Position: Lying)   Pulse 81   Temp 100.2 °F (37.9 °C) (Oral)   Resp 18   Ht 182.9 cm (72\")   Wt 109 kg (239 lb 3.2 oz)   SpO2 99%   BMI 32.44 kg/m²   Patient is examined using the personal protective equipment as per guidelines from infection control for this particular patient as enacted.  Hand washing was performed before and after patient interaction.  General Appearance:    Alert, cooperative, no distress, appears stated age "   Head:    Normocephalic, without obvious abnormality, atraumatic   Eyes:    PERRL, conjunctivae/corneas clear, EOM's intact, both eyes   Ears:    Normal external ear canals, both ears   Nose:   Nares normal, septum midline, mucosa normal, no drainage    or sinus tenderness   Throat:   Lips, tongue, gums normal; oral mucosa pink and moist   Neck:   Supple, symmetrical, trachea midline, no adenopathy;     thyroid:  no enlargement/tenderness/nodules; no carotid    bruit or JVD   Back:     Symmetric, no curvature, ROM normal, no CVA tenderness   Lungs:     Clear to auscultation bilaterally, respirations unlabored   Chest Wall:    No tenderness or deformity    Heart:    Regular rate and rhythm, S1 and S2 normal, no murmur, rub   or gallop   Abdomen:     Soft nontender left scrotal perineal area: Shows significant resolution of cellulitis and induration.  Decreased tenderness.  Area of I&D appears to be packed with minimal drainage.  No groin adenopathy noted.   Extremities:   Extremities normal, atraumatic, no cyanosis or edema   Pulses:   Pulses palpable in all extremities; symmetric all extremities   Skin:   Skin color normal, Skin is warm and dry,  no rashes or palpable lesions   Neurologic:   CNII-XII intact, motor strength grossly intact, sensation grossly intact to light touch, no focal deficits noted            Data Review:    I reviewed the patient's new clinical results.  Results from last 7 days   Lab Units 05/15/21  0627 05/14/21  0605 05/13/21  1333   WBC 10*3/mm3 8.63 11.70* 14.19*   HEMOGLOBIN g/dL 14.2 13.9 15.8   PLATELETS 10*3/mm3 269 264 342     Results from last 7 days   Lab Units 05/15/21  0627 05/14/21  0605 05/13/21  1334   SODIUM mmol/L 136 140 135*   POTASSIUM mmol/L 3.7 3.8 4.3   CHLORIDE mmol/L 105 106 98   CO2 mmol/L 24.4 24.2 25.6   BUN mg/dL 3* 8 11   CREATININE mg/dL 0.58* 0.44* 0.63*   CALCIUM mg/dL 8.2* 8.4* 9.5   GLUCOSE mg/dL 323* 210* 344*     Microbiology Results (last 10 days)      Procedure Component Value - Date/Time    Wound Culture - Wound, Scrotum [[id number]] Collected: 05/13/21 1757    Lab Status: Preliminary result Specimen: Wound from Scrotum Updated: 05/15/21 0947     Wound Culture Rare Normal Urogenital Mag     Gram Stain Few (2+) WBCs seen      Rare (1+) Gram positive cocci      Rare (1+) Gram negative bacilli    COVID PRE-OP / PRE-PROCEDURE SCREENING ORDER (NO ISOLATION) - Swab, Nasopharynx [[id number]]  (Normal) Collected: 05/13/21 1343    Lab Status: Final result Specimen: Swab from Nasopharynx Updated: 05/13/21 1639    Narrative:      The following orders were created for panel order COVID PRE-OP / PRE-PROCEDURE SCREENING ORDER (NO ISOLATION) - Swab, Nasopharynx.  Procedure                               Abnormality         Status                     ---------                               -----------         ------                     COVID-19,[facility]HOU[facility]...[[id number]]  Normal              Final result                 Please view results for these tests on the individual orders.    COVID-19,[facility]-HOU[facility] CEPHEID, NP SWAB IN TRANSPORT MEDIA 8-12 HR TAT - Swab, Nasopharynx [[id number]]  (Normal) Collected: 05/13/21 1343    Lab Status: Final result Specimen: Swab from Nasopharynx Updated: 05/13/21 1639     COVID19 Not Detected    Narrative:      Fact sheet for providers: https://www.fda.gov/media/379095/download     Fact sheet for patients: https://www.fda.gov/media/448095/download    Blood Culture - Blood, Arm, Left [[id number]] Collected: 05/13/21 1342    Lab Status: Preliminary result Specimen: Blood from Arm, Left Updated: 05/15/21 1400     Blood Culture No growth at 2 days    Blood Culture - Blood, Arm, Right [[id number]] Collected: 05/13/21 1334    Lab Status: Preliminary result Specimen: Blood from Arm, Right Updated: 05/15/21 1345     Blood Culture No growth at 2 days            Assessment:  1-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        Recommendations/Discussions:  · Continue IV fluids and empiric vancomycin and Zosyn while awaiting the intraoperative culture results following I&D.  · Patient would need periodic assessment by urology service to decide if there is further need for I&D.    · Monitor renal function on current combination of antibiotics and de-escalate antibiotic treatment once the culture data is available and his clinical course is stabilized.    · If he continues to do well and no more surgical intervention was performed and his cultures remain negative then would recommend transitioning him to oral Augmentin and doxycycline for total of 14 days from his last surgery.    Mauricio Barr MD  5/15/2021  20:38 EDT    Much of this encounter note is an electronic transcription/translation of spoken language to printed text. The electronic translation of spoken language may permit erroneous, or at times, nonsensical words or phrases to be inadvertently transcribed; Although I have reviewed the note for such errors, some may still exist

## 2021-05-16 NOTE — PLAN OF CARE
Goal Outcome Evaluation:  Plan of Care Reviewed With: patient  Progress: no change  Outcome Summary: Medicated x3 with Dilaudid 0.5mg and x2 with Percocet. Pt tolerated dressing change well tonight. Voiding well. BG remain in 200's. Will continue to monitor.

## 2021-05-16 NOTE — PROGRESS NOTES
Name: Graham Meléndez ADMIT: 2021   : 1970  PCP: Reyes, Rosenberg Acosta, MD    MRN: [id number] LOS: 3 days   AGE/SEX: 50 y.o. male  ROOM: [facility]     Subjective   Subjective     He's doing pretty well today.        Objective   Objective   Vital Signs  Temp:  [97.3 °F (36.3 °C)-98 °F (36.7 °C)] 98 °F (36.7 °C)  Heart Rate:  [61-67] 67  Resp:  [16] 16  BP: (103-137)/(57-82) 137/82  SpO2:  [95 %-99 %] 99 %  on   ;   Device (Oxygen Therapy): room air  Body mass index is 33.13 kg/m².  Physical Exam  Constitutional:       General: He is not in acute distress.  HENT:      Head: Normocephalic and atraumatic.   Cardiovascular:      Rate and Rhythm: Normal rate and regular rhythm.      Heart sounds: Normal heart sounds.   Pulmonary:      Effort: Pulmonary effort is normal.      Breath sounds: Normal breath sounds.   Abdominal:      General: Bowel sounds are normal. There is no distension.      Palpations: Abdomen is soft.      Tenderness: There is no abdominal tenderness. There is no guarding or rebound.   Musculoskeletal:         General: No tenderness.      Right lower leg: No edema.      Left lower leg: No edema.   Neurological:      Mental Status: He is alert and oriented to person, place, and time.   Psychiatric:         Mood and Affect: Mood normal.         Behavior: Behavior normal.         Results Review     I reviewed the patient's new clinical results.  Results from last 7 days   Lab Units 21  0643 05/15/21  0621  0621  1333   WBC 10*3/mm3 8.42 8.63 11.70* 14.19*   HEMOGLOBIN g/dL 12.3* 14.2 13.9 15.8   PLATELETS 10*3/mm3 281 269 264 342     Results from last 7 days   Lab Units 21  0643 05/15/21  0621  0621  1334   SODIUM mmol/L 139 136 140 135*   POTASSIUM mmol/L 3.6 3.7 3.8 4.3   CHLORIDE mmol/L 108* 105 106 98   CO2 mmol/L 23.9 24.4 24.2 25.6   BUN mg/dL 5* 3* 8 11   CREATININE mg/dL 0.50* 0.58* 0.44* 0.63*   GLUCOSE mg/dL 290* 323* 210* 344*    Estimated Creatinine Clearance: 232.6 mL/min (A) (by C-G formula based on SCr of 0.5 mg/dL (L)).  Results from last 7 days   Lab Units 05/13/21  1334   ALBUMIN g/dL 3.80   BILIRUBIN mg/dL 0.3   ALK PHOS U/L 91   AST (SGOT) U/L 9   ALT (SGPT) U/L 7     Results from last 7 days   Lab Units 05/16/21  0643 05/15/21  0627 05/14/21  0605 05/13/21  1334   CALCIUM mg/dL 8.1* 8.2* 8.4* 9.5   ALBUMIN g/dL  --   --   --  3.80     Results from last 7 days   Lab Units 05/13/21  1645 05/13/21  1333   LACTATE mmol/L 1.8 2.5*     COVID19   Date Value Ref Range Status   05/13/2021 Not Detected Not Detected - Ref. Range Final   05/05/2021 Not Detected Not Detected - Ref. Range Final     Hemoglobin A1C   Date/Time Value Ref Range Status   05/15/2021 0627 12.36 (H) 4.80 - 5.60 % Final     Glucose   Date/Time Value Ref Range Status   05/16/2021 1552 336 (H) 70 - 130 mg/dL Final   05/16/2021 1058 259 (H) 70 - 130 mg/dL Final   05/16/2021 0648 289 (H) 70 - 130 mg/dL Final   05/15/2021 2107 255 (H) 70 - 130 mg/dL Final   05/15/2021 1617 395 (H) 70 - 130 mg/dL Final   05/15/2021 1113 268 (H) 70 - 130 mg/dL Final   05/15/2021 0628 326 (H) 70 - 130 mg/dL Final       US Scrotum & Testicles with doppler  Narrative: EXAMINATION: SCROTAL SONOGRAPHY WITH TESTICULAR DOPPLER     HISTORY: 50-year-old male with a history of left testicular pain for a  couple of days with swelling.     FINDINGS: Scrotal sonography with testicular Doppler was performed. The  right testicle measures 4.2 x 3.3 x 1.9 cm and the left testicle  measures 4.1 x 2.6 x 1.8 cm. Normal vascularity and echotexture seen in  both testes. Both epididymides have a normal appearance.     Posterior and inferior in the left hemiscrotum there is a 2.8 x 2.3 x  2.8 cm multifocal echogenic region that extends into the skin and is  most consistent with presence of an abscess with internal gas.     Impression: 1. There is a 2.8 cm gas-containing region in the posterior inferior  left  hemiscrotum that is most consistent with an abscess. This may  represent necrotizing fasciitis of the scrotum, also known as Rene's  gangrene. Surgical evaluation and further evaluation with a CT of the  pelvis is recommended.  2. Normal sonographic appearance of both testes.     This was communicated to Dr. Ra Wu in the emergency room on  05/13/2021 at 2:18 PM.     This report was finalized on 5/13/2021 4:04 PM by Dr. Jonas Candelario M.D.     CT Pelvis With Contrast  Narrative: CT PELVIS WITH IV CONTRAST     HISTORY: Diabetes with testicular and scrotal pain, concern for abscess     TECHNIQUE: Radiation dose reduction techniques were utilized, including  automated exposure control and exposure modulation based on body size.   3 mm images were obtained through the pelvis after the administration of  IV contrast.      COMPARISON: Testicular sonogram 05/13/2021     Impression: FINDINGS AND IMPRESSION:     No adenopathy by size criteria is present within the visualized pelvis.     There is colonic diverticulosis. The appendix is unremarkable. The  bladder has an unremarkable postcontrast CT appearance for its degree of  distention.     No suspicious lytic or blastic osseous lesions are present.     Soft tissue thickening and fat stranding is present within the superior  aspect of the left scrotum with extension into the perineum posteriorly.  Soft tissue thickening and fat stranding extends inferiorly into the  scrotum with discrete soft tissue gas visualized within the scrotum.  Ill-defined fluid layers posterior inferiorly within the scrotum and is  concerning for developing abscess. I confirmed with the ER physician  that the patient has not undergone a recent incision and drainage.  Findings indicate Fourniers gangrene. Urologic consultation is  recommended. The above findings were discussed with Dr. Wu by  telephone by Kiko Edwards at 2:53 PM on   05/13/2021 .     This report was finalized on  5/13/2021 3:03 PM by Dr. Kiko Edwards M.D.       Scheduled Medications  amLODIPine, 10 mg, Oral, Daily  atorvastatin, 40 mg, Oral, Daily  enoxaparin, 40 mg, Subcutaneous, Nightly  insulin glargine, 25 Units, Subcutaneous, Nightly  insulin lispro, 0-9 Units, Subcutaneous, TID AC  losartan, 50 mg, Oral, Q24H  metoprolol tartrate, 25 mg, Oral, BID  pantoprazole, 40 mg, Oral, QAM  piperacillin-tazobactam, 3.375 g, Intravenous, Q8H  senna-docusate sodium, 2 tablet, Oral, BID    Infusions  lactated ringers, 9 mL/hr, Last Rate: Stopped (05/13/21 2136)  Pharmacy to dose vancomycin,   sodium chloride, 100 mL/hr, Last Rate: 100 mL/hr (05/16/21 0918)    Diet  Diet Regular; Consistent Carbohydrate       Assessment/Plan     Active Hospital Problems    Diagnosis  POA   • **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       50 y.o. 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    Rene's gangrene status post I&D on 5/13/2021-ID and urology were consulted.  Patient is on IV Zosyn and vancomycin.  Cultures without growth to date. If cultures remain negative, then plan will be to switch to augmentin and doxycycline for 14 days from 5/13/2021, which would be through 5/27/2021.    Type 2 diabetes with hyperglycemia-a1c 12.36. still uncontrolled. Increase lantus dose to 30 units.    Essential hypertension-BP adequately controlled on current regimen  Lactic acidosis-resolved  Hyperlipidemia  GERD  Obesity  DERIC  History of recurrent scrotal/inguinal abscess status post drainage in 2012      Pharmacy to dose Lovenox for DVT prophylaxis.   Full code.  Discussed with patient.  Anticipate discharge TBD--will need home health at d/c for dressing  changes      Juvencio Osorio MD  [facility]  05/16/21  16:48 EDT    I wore protective equipment throughout this patient encounter including a face mask, gloves and protective eyewear.  Hand hygiene was performed before donning protective equipment and after removal when leaving the room.

## 2021-05-16 NOTE — PROGRESS NOTES
CC: It hurts if you touch it    Resting in bed  NAD  Pain controlled  Urinating spontaneously    Scrotal Incision- BID dressing changes, no bleeding when packing removed, no dusky tissue  Phallus wnl    Anaerobic culture - awaiting  WBC 8.4  Hgb 12.3  Cr 0.50    Plan:  Continue BID dressing changes  Abx per ID  Tight blood glucos control  May need home health to do dressing changes, need to discuss with patient

## 2021-05-17 VITALS
DIASTOLIC BLOOD PRESSURE: 65 MMHG | TEMPERATURE: 97.4 F | SYSTOLIC BLOOD PRESSURE: 121 MMHG | RESPIRATION RATE: 16 BRPM | HEART RATE: 64 BPM | BODY MASS INDEX: 33.72 KG/M2 | WEIGHT: 249 LBS | HEIGHT: 72 IN | OXYGEN SATURATION: 97 %

## 2021-05-17 PROBLEM — N49.3 FOURNIER'S GANGRENE IN MALE: Status: RESOLVED | Noted: 2021-05-13 | Resolved: 2021-05-17

## 2021-05-17 LAB
ANION GAP SERPL CALCULATED.3IONS-SCNC: 9.5 MMOL/L (ref 5–15)
BACTERIA SPEC AEROBE CULT: NORMAL
BUN SERPL-MCNC: 5 MG/DL (ref 6–20)
BUN/CREAT SERPL: 9.3 (ref 7–25)
CALCIUM SPEC-SCNC: 7.9 MG/DL (ref 8.6–10.5)
CHLORIDE SERPL-SCNC: 104 MMOL/L (ref 98–107)
CO2 SERPL-SCNC: 23.5 MMOL/L (ref 22–29)
CREAT SERPL-MCNC: 0.54 MG/DL (ref 0.76–1.27)
DEPRECATED RDW RBC AUTO: 42.5 FL (ref 37–54)
ERYTHROCYTE [DISTWIDTH] IN BLOOD BY AUTOMATED COUNT: 13.4 % (ref 12.3–15.4)
GFR SERPL CREATININE-BSD FRML MDRD: >150 ML/MIN/1.73
GLUCOSE BLDC GLUCOMTR-MCNC: 279 MG/DL (ref 70–130)
GLUCOSE SERPL-MCNC: 307 MG/DL (ref 65–99)
GRAM STN SPEC: NORMAL
HCT VFR BLD AUTO: 38.8 % (ref 37.5–51)
HGB BLD-MCNC: 13 G/DL (ref 13–17.7)
MCH RBC QN AUTO: 29.3 PG (ref 26.6–33)
MCHC RBC AUTO-ENTMCNC: 33.5 G/DL (ref 31.5–35.7)
MCV RBC AUTO: 87.4 FL (ref 79–97)
PLATELET # BLD AUTO: 265 10*3/MM3 (ref 140–450)
PMV BLD AUTO: 10.2 FL (ref 6–12)
POTASSIUM SERPL-SCNC: 3.6 MMOL/L (ref 3.5–5.2)
RBC # BLD AUTO: 4.44 10*6/MM3 (ref 4.14–5.8)
SODIUM SERPL-SCNC: 137 MMOL/L (ref 136–145)
WBC # BLD AUTO: 8.79 10*3/MM3 (ref 3.4–10.8)

## 2021-05-17 PROCEDURE — 25010000002 PIPERACILLIN SOD-TAZOBACTAM PER 1 G: Performed by: STUDENT IN AN ORGANIZED HEALTH CARE EDUCATION/TRAINING PROGRAM

## 2021-05-17 PROCEDURE — 25010000002 HYDROMORPHONE PER 4 MG: Performed by: NURSE PRACTITIONER

## 2021-05-17 PROCEDURE — 63710000001 INSULIN LISPRO (HUMAN) PER 5 UNITS: Performed by: UROLOGY

## 2021-05-17 PROCEDURE — 80048 BASIC METABOLIC PNL TOTAL CA: CPT | Performed by: UROLOGY

## 2021-05-17 PROCEDURE — 85027 COMPLETE CBC AUTOMATED: CPT | Performed by: UROLOGY

## 2021-05-17 PROCEDURE — 25010000002 VANCOMYCIN 10 G RECONSTITUTED SOLUTION: Performed by: STUDENT IN AN ORGANIZED HEALTH CARE EDUCATION/TRAINING PROGRAM

## 2021-05-17 PROCEDURE — 82962 GLUCOSE BLOOD TEST: CPT

## 2021-05-17 RX ORDER — DOXYCYCLINE 100 MG/1
100 CAPSULE ORAL EVERY 12 HOURS SCHEDULED
Qty: 22 CAPSULE | Refills: 0 | Status: SHIPPED | OUTPATIENT
Start: 2021-05-17 | End: 2021-05-28

## 2021-05-17 RX ORDER — DOXYCYCLINE 100 MG/1
100 CAPSULE ORAL EVERY 12 HOURS SCHEDULED
Status: DISCONTINUED | OUTPATIENT
Start: 2021-05-17 | End: 2021-05-17 | Stop reason: HOSPADM

## 2021-05-17 RX ORDER — LANCETS 28 GAUGE
1 EACH MISCELLANEOUS 2 TIMES DAILY
Qty: 100 EACH | Refills: 12 | Status: SHIPPED | OUTPATIENT
Start: 2021-05-17 | End: 2022-05-17

## 2021-05-17 RX ORDER — AMOXICILLIN AND CLAVULANATE POTASSIUM 875; 125 MG/1; MG/1
1 TABLET, FILM COATED ORAL EVERY 12 HOURS SCHEDULED
Qty: 22 TABLET | Refills: 0 | Status: SHIPPED | OUTPATIENT
Start: 2021-05-17 | End: 2021-05-28

## 2021-05-17 RX ORDER — AMOXICILLIN 250 MG
2 CAPSULE ORAL 2 TIMES DAILY
Qty: 28 TABLET | Refills: 0 | Status: SHIPPED | OUTPATIENT
Start: 2021-05-17 | End: 2021-05-24

## 2021-05-17 RX ORDER — PEN NEEDLE, DIABETIC 30 GX3/16"
1 NEEDLE, DISPOSABLE MISCELLANEOUS DAILY
Qty: 100 EACH | Refills: 0 | Status: SHIPPED | OUTPATIENT
Start: 2021-05-17 | End: 2021-06-16

## 2021-05-17 RX ORDER — BLOOD-GLUCOSE METER
1 KIT MISCELLANEOUS 2 TIMES DAILY
Qty: 1 EACH | Refills: 0 | Status: SHIPPED | OUTPATIENT
Start: 2021-05-17 | End: 2021-06-16

## 2021-05-17 RX ORDER — OXYCODONE HYDROCHLORIDE AND ACETAMINOPHEN 5; 325 MG/1; MG/1
2 TABLET ORAL EVERY 4 HOURS PRN
Qty: 60 TABLET | Refills: 0 | Status: SHIPPED | OUTPATIENT
Start: 2021-05-17 | End: 2021-05-22

## 2021-05-17 RX ORDER — AMOXICILLIN AND CLAVULANATE POTASSIUM 875; 125 MG/1; MG/1
1 TABLET, FILM COATED ORAL EVERY 12 HOURS SCHEDULED
Status: DISCONTINUED | OUTPATIENT
Start: 2021-05-17 | End: 2021-05-17 | Stop reason: HOSPADM

## 2021-05-17 RX ADMIN — VANCOMYCIN HYDROCHLORIDE 1750 MG: 10 INJECTION, POWDER, LYOPHILIZED, FOR SOLUTION INTRAVENOUS at 03:55

## 2021-05-17 RX ADMIN — Medication 3 MG: at 01:28

## 2021-05-17 RX ADMIN — HYDROMORPHONE HYDROCHLORIDE 0.5 MG: 2 INJECTION, SOLUTION INTRAMUSCULAR; INTRAVENOUS; SUBCUTANEOUS at 09:11

## 2021-05-17 RX ADMIN — HYDROMORPHONE HYDROCHLORIDE 0.5 MG: 2 INJECTION, SOLUTION INTRAMUSCULAR; INTRAVENOUS; SUBCUTANEOUS at 01:28

## 2021-05-17 RX ADMIN — TAZOBACTAM SODIUM AND PIPERACILLIN SODIUM 3.38 G: 375; 3 INJECTION, SOLUTION INTRAVENOUS at 07:27

## 2021-05-17 RX ADMIN — DOXYCYCLINE 100 MG: 100 CAPSULE ORAL at 12:23

## 2021-05-17 RX ADMIN — METOPROLOL TARTRATE 25 MG: 25 TABLET, FILM COATED ORAL at 09:11

## 2021-05-17 RX ADMIN — LOSARTAN POTASSIUM 50 MG: 50 TABLET, FILM COATED ORAL at 09:11

## 2021-05-17 RX ADMIN — OXYCODONE HYDROCHLORIDE AND ACETAMINOPHEN 2 TABLET: 5; 325 TABLET ORAL at 05:31

## 2021-05-17 RX ADMIN — ATORVASTATIN CALCIUM 40 MG: 20 TABLET, FILM COATED ORAL at 09:11

## 2021-05-17 RX ADMIN — INSULIN LISPRO 6 UNITS: 100 INJECTION, SOLUTION INTRAVENOUS; SUBCUTANEOUS at 07:27

## 2021-05-17 RX ADMIN — AMLODIPINE BESYLATE 10 MG: 10 TABLET ORAL at 09:11

## 2021-05-17 RX ADMIN — PANTOPRAZOLE SODIUM 40 MG: 40 TABLET, DELAYED RELEASE ORAL at 07:27

## 2021-05-17 RX ADMIN — AMOXICILLIN AND CLAVULANATE POTASSIUM 1 TABLET: 875; 125 TABLET, FILM COATED ORAL at 12:24

## 2021-05-17 NOTE — PROGRESS NOTES
Scrotal abscess s/p I&D, poorly controlled diabetes    Comfortable, no issues overnight.    AVSS  Scrotum: no fluctuant mass or erythema. No crepitus or drainage  Wound base clean.    Scrotum culture: urogenital gold.    Plan:  - continue dressing bid  - antibiotic recommendations per ID  - safe for discharge at any point from urology perspective  - recommend home wound nursing to assist with dressing changes

## 2021-05-17 NOTE — PAYOR COMM NOTE
"DISCHARGED    REF #[id number]        Maria D Haywood (50 y.o. Male)     Date of Birth Social Security Number Address Home Phone MRN    1970  [address]  [address]  [place] [address] [phone] [id number]    Yarsanism Marital Status          None Single       Admission Date Admission Type Admitting Provider Attending Provider Department, Room/Bed    5/13/21 Emergency Stingl, Emmy Le MD  [facility] 4 [address], P480/1    Discharge Date Discharge Disposition Discharge Destination        5/17/2021 Home-Health Care Svc              Attending Provider: (none)   Allergies: No Known Allergies    Isolation: None   Infection: None   Code Status: CPR    Ht: 182.9 cm (72\")   Wt: 113 kg (249 lb)    Admission Cmt: None   Principal Problem: 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]                 Active Insurance as of 5/13/2021     Primary Coverage     Payor Plan Insurance Group Employer/Plan Group    [facility] [employer] BY [employer] [id number]     Payor Plan Address Payor Plan Phone Number Payor Plan Fax Number Effective Dates    [address]   1/1/2021 - None Entered    [facility] [address]       Subscriber Name Subscriber Birth Date Member ID       MARIA D HAYWOOD 1970 [id number]                 Emergency Contacts      (Rel.) Home Phone Work Phone Mobile Phone    Melody Haywood (Mother) [phone] -- [phone]    demian foster (Sister) [phone] -- [phone]            Discharge Order (From admission, onward)     Start     Ordered    05/17/21 0854  Discharge patient  Once     Expected Discharge Date: 05/17/21    Discharge Disposition: Home-Health Care Svc    Physician of Record for Attribution - Please select from Treatment Team: JOSE MARIA ORTA [[id number]]    Review needed by CMO to determine Physician of Record: No       Question Answer Comment   Physician of Record for Attribution - Please select from Treatment Team JOSE MARIA ORTA    Review needed by CMO " to determine Physician of Record No        05/17/21 0906

## 2021-05-17 NOTE — DISCHARGE SUMMARY
Patient Name: Graham Meléndez  : 1970  MRN: [id number]    Date of Admission: 2021  Date of Discharge:  2021  Primary Care Physician: Reyes, Rosenberg Acosta, MD      Chief Complaint:   Testicle Pain (Pt c/o left testicle pain x 2 weeks, recently treated for a cyst on left testicle.  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 Meléndez is a 50 y.o. male with a history of uncontrolled DM2, essential hypertension, hyperlipidemia, gerd, obesity, DERIC, recurrent scrotal/inguinal abscess s/p drainage in  who presented to [facility] initially complaining of scrotal pain.  Please see the admitting history and physical for further details.  He was found to have a scrotal abscess and was admitted to the hospital for further evaluation and treatment.      He was seen in consultation by urology and infectious disease. On , he underwent I&D of the scrotal abscess with drainage of purulent material and excision of necrotic areas of skin. He was started on broad spectrum antibiotics and wound care was initiated. Unfortunately, cultures have been negative to date, so infectious disease has recommended that he complete 14 total days of antibiotics from the date of surgery. He has been receiving vanco and zosyn since surgery, and will continue with augmentin and doxycycline for the remainder of his course. He is receiving BID dressing changes to the wound, and these will continue with the help of home health.     His hospitalization was complicated by uncontrolled  diabetes and so he was initiated on insulin. His glucose is still too high on discharge, but he is to resume his non-insulin medications and follow up with his PCP and should consider referral to endocrinology for further treatment to try to prevent similar infections in the future. The patient was seen by diabetes educator while hospitalized.     Day of Discharge     Subjective:  Doing well this morning. No complaints. He has been cleared for discharge.    Physical Exam:  Temp:  [97.4 °F (36.3 °C)-98 °F (36.7 °C)] 97.4 °F (36.3 °C)  Heart Rate:  [62-67] 64  Resp:  [16] 16  BP: (119-137)/(65-82) 121/65  Body mass index is 33.77 kg/m².  Physical Exam  Constitutional:       General: He is not in acute distress.     Appearance: He is obese.   Cardiovascular:      Rate and Rhythm: Normal rate and regular rhythm.      Heart sounds: Normal heart sounds.   Pulmonary:      Effort: Pulmonary effort is normal.      Breath sounds: Normal breath sounds.   Abdominal:      General: Bowel sounds are normal.      Palpations: Abdomen is soft.   Neurological:      Mental Status: He is alert.   Psychiatric:         Mood and Affect: Mood normal.         Behavior: Behavior normal.         Consultants     Consult Orders (all) (From admission, onward)     Start     Ordered    05/13/21 2219  Inpatient Infectious Diseases Consult  Once     Specialty:  Infectious Diseases  Provider:  Mauricio Barr MD    05/13/21 2218 05/13/21 2113  Inpatient Diabetes Educator Consult  Once     Provider:  (Not yet assigned)    05/13/21 2113 05/13/21 1536  [facility] (on-call MD unless specified) Details  Once     Specialty:  Hospitalist  Provider:  (Not yet assigned)    05/13/21 1535    05/13/21 1456  Urology (on-call MD unless specified)  Once     Specialty:  Urology  Provider:  (Not yet assigned)    05/13/21 1455              Procedures     Imaging Results (All)     Procedure Component Value Units Date/Time    US Scrotum & Testicles with doppler [[id number]]  Collected: 05/13/21 1501     Updated: 05/13/21 1608    Narrative:      EXAMINATION: SCROTAL SONOGRAPHY WITH TESTICULAR DOPPLER     HISTORY: 50-year-old male with a history of left testicular pain for a  couple of days with swelling.     FINDINGS: Scrotal sonography with testicular Doppler was performed. The  right testicle measures 4.2 x 3.3 x 1.9 cm and the left testicle  measures 4.1 x 2.6 x 1.8 cm. Normal vascularity and echotexture seen in  both testes. Both epididymides have a normal appearance.     Posterior and inferior in the left hemiscrotum there is a 2.8 x 2.3 x  2.8 cm multifocal echogenic region that extends into the skin and is  most consistent with presence of an abscess with internal gas.       Impression:      1. There is a 2.8 cm gas-containing region in the posterior inferior  left hemiscrotum that is most consistent with an abscess. This may  represent necrotizing fasciitis of the scrotum, also known as Rene's  gangrene. Surgical evaluation and further evaluation with a CT of the  pelvis is recommended.  2. Normal sonographic appearance of both testes.     This was communicated to Dr. Ra Wu in the emergency room on  05/13/2021 at 2:18 PM.     This report was finalized on 5/13/2021 4:04 PM by Dr. Jonas Candelario M.D.       CT Pelvis With Contrast [[id number]] Collected: 05/13/21 1452     Updated: 05/13/21 1506    Narrative:      CT PELVIS WITH IV CONTRAST     HISTORY: Diabetes with testicular and scrotal pain, concern for abscess     TECHNIQUE: Radiation dose reduction techniques were utilized, including  automated exposure control and exposure modulation based on body size.   3 mm images were obtained through the pelvis after the administration of  IV contrast.      COMPARISON: Testicular sonogram 05/13/2021       Impression:      FINDINGS AND IMPRESSION:     No adenopathy by size criteria is present within the visualized pelvis.     There is colonic diverticulosis. The appendix is  unremarkable. The  bladder has an unremarkable postcontrast CT appearance for its degree of  distention.     No suspicious lytic or blastic osseous lesions are present.     Soft tissue thickening and fat stranding is present within the superior  aspect of the left scrotum with extension into the perineum posteriorly.  Soft tissue thickening and fat stranding extends inferiorly into the  scrotum with discrete soft tissue gas visualized within the scrotum.  Ill-defined fluid layers posterior inferiorly within the scrotum and is  concerning for developing abscess. I confirmed with the ER physician  that the patient has not undergone a recent incision and drainage.  Findings indicate Fourniers gangrene. Urologic consultation is  recommended. The above findings were discussed with Dr. Wu by  telephone by Kiko Edwards at 2:53 PM on   05/13/2021 .     This report was finalized on 5/13/2021 3:03 PM by Dr. Kiko Edwards M.D.             Pertinent Labs     Results from last 7 days   Lab Units 05/17/21  0527 05/16/21  0643 05/15/21  0627 05/14/21  0605   WBC 10*3/mm3 8.79 8.42 8.63 11.70*   HEMOGLOBIN g/dL 13.0 12.3* 14.2 13.9   PLATELETS 10*3/mm3 265 281 269 264     Results from last 7 days   Lab Units 05/17/21 0527 05/16/21 0643 05/15/21  0627 05/14/21  0605   SODIUM mmol/L 137 139 136 140   POTASSIUM mmol/L 3.6 3.6 3.7 3.8   CHLORIDE mmol/L 104 108* 105 106   CO2 mmol/L 23.5 23.9 24.4 24.2   BUN mg/dL 5* 5* 3* 8   CREATININE mg/dL 0.54* 0.50* 0.58* 0.44*   GLUCOSE mg/dL 307* 290* 323* 210*   Estimated Creatinine Clearance: 217.2 mL/min (A) (by C-G formula based on SCr of 0.54 mg/dL (L)).  Results from last 7 days   Lab Units 05/13/21  1334   ALBUMIN g/dL 3.80   BILIRUBIN mg/dL 0.3   ALK PHOS U/L 91   AST (SGOT) U/L 9   ALT (SGPT) U/L 7     Results from last 7 days   Lab Units 05/17/21 0527 05/16/21  0643 05/15/21  0627 05/14/21  0605 05/13/21  1334   CALCIUM mg/dL 7.9* 8.1* 8.2* 8.4* 9.5   ALBUMIN g/dL  --   --    --   --  3.80               Invalid input(s): LDLCALC  Results from last 7 days   Lab Units 05/13/21  1757 05/13/21  1342 05/13/21  1334   BLOODCX   --  No growth at 3 days No growth at 3 days   WOUNDCX  Rare Normal Urogenital Mag  --   --        Test Results Pending at Discharge     Pending Labs     Order Current Status    Anaerobic Culture - Wound, Scrotum Preliminary result    Blood Culture - Blood, Arm, Left Preliminary result    Blood Culture - Blood, Arm, Right Preliminary result          Discharge Details        Discharge Medications      New Medications      Instructions Start Date   Accu-Chek Vicky device   1 each, Other, 2 times daily, Use as instructed      Accu-Chek Softclix Lancets lancets   1 each, Other, 2 times daily, Use as instructed      amoxicillin-clavulanate 875-125 MG per tablet  Commonly known as: AUGMENTIN   1 tablet, Oral, Every 12 Hours Scheduled      doxycycline 100 MG capsule  Commonly known as: MONODOX   100 mg, Oral, Every 12 Hours Scheduled      glucose blood test strip  Commonly known as: Accu-Chek Vicky   1 each, Other, 2 times daily, Use as instructed      Insulin Glargine 100 UNIT/ML injection pen  Commonly known as: LANTUS SOLOSTAR  Replaces: insulin glargine 100 UNIT/ML injection   30 Units, Subcutaneous, Nightly      oxyCODONE-acetaminophen 5-325 MG per tablet  Commonly known as: PERCOCET   2 tablets, Oral, Every 4 Hours PRN      sennosides-docusate 8.6-50 MG per tablet  Commonly known as: PERICOLACE   2 tablets, Oral, 2 Times Daily         Continue These Medications      Instructions Start Date   acetaminophen 325 MG tablet  Commonly known as: TYLENOL   325 mg, Oral, Every 4 Hours PRN      amLODIPine 10 MG tablet  Commonly known as: NORVASC   10 mg, Oral, Daily      aspirin 81 MG EC tablet   81 mg, Oral, Daily      atorvastatin 40 MG tablet  Commonly known as: LIPITOR   40 mg, Oral, Daily      esomeprazole 20 MG capsule  Commonly known as: nexIUM   20 mg, Oral, Every Morning  Before Breakfast      Farxiga 10 MG tablet  Generic drug: Dapagliflozin Propanediol   10 mg, Oral, Daily, Pt is noncompliant       linagliptin 5 MG tablet tablet  Commonly known as: TRADJENTA   5 mg, Oral, Daily      Liraglutide 18 MG/3ML solution pen-injector injection  Commonly known as: Victoza   1.2 mg, Subcutaneous, Daily, Pt is not taking this med due to cost       metFORMIN 1000 MG tablet  Commonly known as: GLUCOPHAGE   1,000 mg, Oral, 2 Times Daily With Meals      metoprolol tartrate 25 MG tablet  Commonly known as: LOPRESSOR   25 mg, Oral, 2 Times Daily      olmesartan 20 MG tablet  Commonly known as: BENICAR   20 mg, Oral, Daily      pioglitazone 30 MG tablet  Commonly known as: Actos   30 mg, Oral, Daily      vitamin D 1.25 MG (85482 UT) capsule capsule  Commonly known as: ERGOCALCIFEROL   50,000 Units, Oral, Every 7 Days         Stop These Medications    ciprofloxacin 500 MG tablet  Commonly known as: CIPRO     insulin glargine 100 UNIT/ML injection  Commonly known as: LANT SEMGLEE  Replaced by: Insulin Glargine 100 UNIT/ML injection pen     sulfamethoxazole-trimethoprim 800-160 MG per tablet  Commonly known as: BACTRIM DS,SEPTRA DS            No Known Allergies      Discharge Disposition:  Home-Health Care Svc    Discharge Diet:  Diet Order   Procedures   • Diet Regular; Consistent Carbohydrate       Discharge Activity:   Activity Instructions     Activity as Tolerated            CODE STATUS:    Code Status and Medical Interventions:   Ordered at: 05/13/21 2035     Code Status:    CPR     Medical Interventions (Level of Support Prior to Arrest):    Full       No future appointments.  Additional Instructions for the Follow-ups that You Need to Schedule     Ambulatory Referral to Home Health   As directed      Face to Face Visit Date: 5/17/2021    Follow-up provider for Plan of Care?: I treated the patient in an acute care facility and will not continue treatment after discharge.    Follow-up  provider: REYES, ROSENBERG ACOSTA [[id number]]    Reason/Clinical Findings: perineal cellulitis s/p i&d    Describe mobility limitations that make leaving home difficult: perineal cellulitis s/p i&d    Nursing/Therapeutic Services Requested: Skilled Nursing    Skilled nursing orders: Pain management Medication education Wound care dressing/changes    Frequency: 1 Week 1         Call MD With Problems / Concerns   As directed      Instructions: call your primary care physician if you experience chest pain, shortness of breath, abdominal pain, nausea, vomiting, fevers, sweats, chills, or worsening of your symptoms    Order Comments: Instructions: call your primary care physician if you experience chest pain, shortness of breath, abdominal pain, nausea, vomiting, fevers, sweats, chills, or worsening of your symptoms          Discharge Follow-up with PCP   As directed       Currently Documented PCP:    Reyes, Rosenberg Acosta, MD    PCP Phone Number:    [phone]     Follow Up Details: within a week of discharge         Discharge Follow-up with Specialty: urology as directed   As directed      Specialty: urology as directed           Follow-up Information     Reyes, Rosenberg Acosta, MD .    Specialty: Family Medicine  Why: within a week of discharge  Contact information:  [address] [place] [address]  [phone]                   Additional Instructions for the Follow-ups that You Need to Schedule     Ambulatory Referral to Home Health   As directed      Face to Face Visit Date: 5/17/2021    Follow-up provider for Plan of Care?: I treated the patient in an acute care facility and will not continue treatment after discharge.    Follow-up provider: REYES, ROSENBERG ACOSTA [[id number]]    Reason/Clinical Findings: perineal cellulitis s/p i&d    Describe mobility limitations that make leaving home difficult: perineal cellulitis s/p i&d    Nursing/Therapeutic Services Requested: Skilled Nursing    Skilled nursing  orders: Pain management Medication education Wound care dressing/changes    Frequency: 1 Week 1         Call MD With Problems / Concerns   As directed      Instructions: call your primary care physician if you experience chest pain, shortness of breath, abdominal pain, nausea, vomiting, fevers, sweats, chills, or worsening of your symptoms    Order Comments: Instructions: call your primary care physician if you experience chest pain, shortness of breath, abdominal pain, nausea, vomiting, fevers, sweats, chills, or worsening of your symptoms          Discharge Follow-up with PCP   As directed       Currently Documented PCP:    Reyes, Rosenberg Acosta, MD    PCP Phone Number:    [phone]     Follow Up Details: within a week of discharge         Discharge Follow-up with Specialty: urology as directed   As directed      Specialty: urology as directed           Time Spent on Discharge:  Greater than 30 minutes      Juvencio Osorio MD  [facility]  05/17/21  09:11 EDT

## 2021-05-17 NOTE — PROGRESS NOTES
Discharge Planning Assessment  [facility]     Patient Name: Graham Meléndez  MRN: [id number]  Today's Date: 5/17/2021    Admit Date: 5/13/2021    Discharge Needs Assessment    No documentation.       Discharge Plan     Row Name 05/17/21 1105       Plan    Plan Comments  Spoke with the patient and he states he does not want home health. He states the nurse taught his sister how to do the packing. JUN Velasquez RN, CCP.        Continued Care and Services - Admitted Since 5/13/2021    Coordination has not been started for this encounter.       Expected Discharge Date and Time     Expected Discharge Date Expected Discharge Time    May 17, 2021         Demographic Summary    No documentation.       Functional Status    No documentation.       Psychosocial    No documentation.       Abuse/Neglect    No documentation.       Legal    No documentation.       Substance Abuse    No documentation.       Patient Forms    No documentation.           Nancy Velasquez, RN

## 2021-05-17 NOTE — PROGRESS NOTES
"  Infectious Diseases Progress Note    Mauricio Barr MD     [facility]  [place]: 3 days  Patient Identification:  Name: Graham Meléndez  Age: 50 y.o.  Sex: male  :  1970  MRN: [id number]         Primary Care Physician: Reyes, Rosenberg Acosta, MD            Subjective: Feeling great very minimal pain and discomfort in the groin area and testicular area.  Interval History: See consultation note.    Objective:    Scheduled Meds:amLODIPine, 10 mg, Oral, Daily  atorvastatin, 40 mg, Oral, Daily  enoxaparin, 40 mg, Subcutaneous, Nightly  insulin glargine, 30 Units, Subcutaneous, Nightly  insulin lispro, 0-9 Units, Subcutaneous, TID AC  losartan, 50 mg, Oral, Q24H  metoprolol tartrate, 25 mg, Oral, BID  pantoprazole, 40 mg, Oral, QAM  piperacillin-tazobactam, 3.375 g, Intravenous, Q8H  senna-docusate sodium, 2 tablet, Oral, BID  [START ON 2021] vancomycin, 1,750 mg, Intravenous, Q12H      Continuous Infusions:lactated ringers, 9 mL/hr, Last Rate: Stopped (21)  Pharmacy to dose vancomycin,   sodium chloride, 100 mL/hr, Last Rate: 100 mL/hr (21 0918)        Vital signs in last 24 hours:  Temp:  [97.5 °F (36.4 °C)-98 °F (36.7 °C)] 98 °F (36.7 °C)  Heart Rate:  [61-67] 67  Resp:  [16] 16  BP: (118-137)/(68-82) 137/82    Intake/Output:    Intake/Output Summary (Last 24 hours) at 2021  Last data filed at 2021 1700  Gross per 24 hour   Intake 1730 ml   Output 1600 ml   Net 130 ml       Exam:  /82 (BP Location: Left arm, Patient Position: Lying)   Pulse 67   Temp 98 °F (36.7 °C) (Oral)   Resp 16   Ht 182.9 cm (72\")   Wt 111 kg (244 lb 4.8 oz)   SpO2 99%   BMI 33.13 kg/m²   Patient is examined using the personal protective equipment as per guidelines from infection control for this particular patient as enacted.  Hand washing was performed before and after patient interaction.  General Appearance:    Alert, cooperative, no distress, appears stated age   Head:    " Normocephalic, without obvious abnormality, atraumatic   Eyes:    PERRL, conjunctivae/corneas clear, EOM's intact, both eyes   Ears:    Normal external ear canals, both ears   Nose:   Nares normal, septum midline, mucosa normal, no drainage    or sinus tenderness   Throat:   Lips, tongue, gums normal; oral mucosa pink and moist   Neck:   Supple, symmetrical, trachea midline, no adenopathy;     thyroid:  no enlargement/tenderness/nodules; no carotid    bruit or JVD   Back:     Symmetric, no curvature, ROM normal, no CVA tenderness   Lungs:     Clear to auscultation bilaterally, respirations unlabored   Chest Wall:    No tenderness or deformity    Heart:    Regular rate and rhythm, S1 and S2 normal, no murmur, rub   or gallop   Abdomen:     Soft nontender left scrotal perineal area: Shows significant resolution of cellulitis and induration.  Decreased tenderness.  Area of I&D appears to be packed with minimal drainage.  No groin adenopathy noted.   Extremities:   Extremities normal, atraumatic, no cyanosis or edema   Pulses:   Pulses palpable in all extremities; symmetric all extremities   Skin:   Skin color normal, Skin is warm and dry,  no rashes or palpable lesions   Neurologic:   Grossly nonfocal            Data Review:    I reviewed the patient's new clinical results.  Results from last 7 days   Lab Units 05/16/21  0643 05/15/21  0627 05/14/21  0605 05/13/21  1333   WBC 10*3/mm3 8.42 8.63 11.70* 14.19*   HEMOGLOBIN g/dL 12.3* 14.2 13.9 15.8   PLATELETS 10*3/mm3 281 269 264 342     Results from last 7 days   Lab Units 05/16/21  0643 05/15/21  0627 05/14/21  0605 05/13/21  1334   SODIUM mmol/L 139 136 140 135*   POTASSIUM mmol/L 3.6 3.7 3.8 4.3   CHLORIDE mmol/L 108* 105 106 98   CO2 mmol/L 23.9 24.4 24.2 25.6   BUN mg/dL 5* 3* 8 11   CREATININE mg/dL 0.50* 0.58* 0.44* 0.63*   CALCIUM mg/dL 8.1* 8.2* 8.4* 9.5   GLUCOSE mg/dL 290* 323* 210* 344*     Microbiology Results (last 10 days)     Procedure Component Value -  Date/Time    Anaerobic Culture - Wound, Scrotum [[id number]] Collected: 05/13/21 1757    Lab Status: Preliminary result Specimen: Wound from Scrotum Updated: 05/16/21 0730     Anaerobic Culture Screening for Anaerobes    Wound Culture - Wound, Scrotum [[id number]] Collected: 05/13/21 1757    Lab Status: Preliminary result Specimen: Wound from Scrotum Updated: 05/16/21 0921     Wound Culture Rare Normal Urogenital Mag     Gram Stain Few (2+) WBCs seen      Rare (1+) Gram positive cocci      Rare (1+) Gram negative bacilli    Narrative:      Organism under investigation.      COVID PRE-OP / PRE-PROCEDURE SCREENING ORDER (NO ISOLATION) - Swab, Nasopharynx [[id number]]  (Normal) Collected: 05/13/21 1343    Lab Status: Final result Specimen: Swab from Nasopharynx Updated: 05/13/21 1639    Narrative:      The following orders were created for panel order COVID PRE-OP / PRE-PROCEDURE SCREENING ORDER (NO ISOLATION) - Swab, Nasopharynx.  Procedure                               Abnormality         Status                     ---------                               -----------         ------                     COVID-19,[facility] IN-HOUSE...[[id number]]  Normal              Final result                 Please view results for these tests on the individual orders.    COVID-19,[facility] IN-HOUSE CEPHEID, NP SWAB IN TRANSPORT MEDIA 8-12 HR TAT - Swab, Nasopharynx [[id number]]  (Normal) Collected: 05/13/21 1343    Lab Status: Final result Specimen: Swab from Nasopharynx Updated: 05/13/21 1639     COVID19 Not Detected    Narrative:      Fact sheet for providers: https://www.fda.gov/media/086035/download     Fact sheet for patients: https://www.fda.gov/media/296724/download    Blood Culture - Blood, Arm, Left [[id number]] Collected: 05/13/21 1342    Lab Status: Preliminary result Specimen: Blood from Arm, Left Updated: 05/16/21 1400     Blood Culture No growth at 3 days    Blood Culture - Blood, Arm, Right [[id number]] Collected: 05/13/21 1339     Lab Status: Preliminary result Specimen: Blood from Arm, Right Updated: 05/16/21 1345     Blood Culture No growth at 3 days            Assessment:  1-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        Recommendations/Discussions:  · Continue IV fluids and empiric vancomycin and Zosyn while awaiting the intraoperative culture results following I&D.  · Patient would need periodic assessment by urology service to decide if there is further need for I&D.    · Monitor renal function on current combination of antibiotics and de-escalate antibiotic treatment once the culture data is available and his clinical course is stabilized.    · If he continues to do well and no more surgical intervention was performed and his cultures remain negative then would recommend transitioning him to oral Augmentin and doxycycline for total of 14 days from his last surgery.    Mauricio Barr MD  5/16/2021  21:20 EDT    Much of this encounter note is an electronic transcription/translation of spoken language to printed text. The electronic translation of spoken language may permit erroneous, or at times, nonsensical words or phrases to be inadvertently transcribed; Although I have reviewed the note for such errors, some may still exist

## 2021-05-17 NOTE — PLAN OF CARE
Goal Outcome Evaluation:     Progress: no change  Outcome Summary: Medciated x3 Dilaudid 0.5mg and x3 Percocet. Dressing changed. Scheduled meds given. Monitored and treated BG.

## 2021-05-17 NOTE — PLAN OF CARE
Goal Outcome Evaluation:  Plan of Care Reviewed With: patient, sibling  Progress: no change  Outcome Summary: Pt upset at start of shift that Dilaudid had been DC'd by MD without discussing with him. MD was called and Dilaudid was added back but changed to every 4 hours PRN. Medicated x2 with Dilaudid prior to dressing changed (had to change x2 due to dressing fall into toilet) and x1 with Percocet thus far. Melatonin given and pt appeared to sleep well. Showed sister how to change dressing as she anticipates assisting pt when he is DC'd. Still awaiting full wound culture results . Will continue to [facility].

## 2021-05-18 ENCOUNTER — READMISSION MANAGEMENT (OUTPATIENT)
Dept: CALL CENTER | Facility: HOSPITAL | Age: 51
End: 2021-05-18

## 2021-05-18 LAB
BACTERIA SPEC AEROBE CULT: NORMAL
BACTERIA SPEC AEROBE CULT: NORMAL
BACTERIA SPEC ANAEROBE CULT: ABNORMAL
BACTERIA SPEC ANAEROBE CULT: ABNORMAL

## 2021-05-18 NOTE — PROGRESS NOTES
Case Management Discharge Note      Final Note: The patient was discharged to home and his sister will do the dressing changes and does not want home health. JUN Velasquez RN, CCP.    Provided Post Acute Provider List?: N/A  N/A Provider List Comment: The patient was not provided with a HH/SNF compare list from Medicare[employer]gov because he denies d/c needs at this time  Provided Post Acute Provider Quality & Resource List?: N/A  N/A Quality & Resource List Comment: The patient was not provided with a HH/SNF compare list from Medicare.gov because he denies d/c needs at this time    Selected Continued Care - Discharged on 5/17/2021 Admission date: 5/13/2021 - Discharge disposition: Home-Health Care Svc    Destination    No services have been selected for the patient.              Durable Medical Equipment    No services have been selected for the patient.              Dialysis/Infusion    No services have been selected for the patient.              Home Medical Care    No services have been selected for the patient.              Therapy    No services have been selected for the patient.              Community Resources    No services have been selected for the patient.                  Transportation Services  Private: Car    Final Discharge Disposition Code: 01 - home or self-care

## 2021-05-18 NOTE — OUTREACH NOTE
Prep Survey      Responses   [facility] facility patient discharged from?  [place]   Is LACE score < 7 ?  No   Emergency Room discharge w/ pulse ox?  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  General Surgery   Does the patient have Home health ordered?  No   Is there a DME ordered?  No   Prep survey completed?  Yes          Livier Meaodws RN

## 2021-05-24 ENCOUNTER — READMISSION MANAGEMENT (OUTPATIENT)
Dept: CALL CENTER | Facility: HOSPITAL | Age: 51
End: 2021-05-24

## 2021-05-24 NOTE — OUTREACH NOTE
General Surgery Week 1 Survey      Responses   [facility] patient discharged from?  [place]   Does the patient have one of the following disease processes/diagnoses(primary or secondary)?  General Surgery   Week 1 attempt successful?  No   Unsuccessful attempts  Attempt 1          Jennifer Dillard LPN

## 2021-05-27 ENCOUNTER — READMISSION MANAGEMENT (OUTPATIENT)
Dept: CALL CENTER | Facility: HOSPITAL | Age: 51
End: 2021-05-27

## 2021-06-02 ENCOUNTER — READMISSION MANAGEMENT (OUTPATIENT)
Dept: CALL CENTER | Facility: HOSPITAL | Age: 51
End: 2021-06-02

## 2021-06-11 ENCOUNTER — READMISSION MANAGEMENT (OUTPATIENT)
Dept: CALL CENTER | Facility: HOSPITAL | Age: 51
End: 2021-06-11

## 2021-06-11 NOTE — OUTREACH NOTE
General Surgery Week 4 Survey      Responses   [facility] patient discharged from?  [place]   Does the patient have one of the following disease processes/diagnoses(primary or secondary)?  General Surgery   Week 4 attempt successful?  No          Liana Olea RN

## 2023-08-02 NOTE — NURSING NOTE
"1445 Dilaudid 1 mg given to pt for c/o facial pain.  1455 Pt called nurse in the room and stated that he did not receive his dose of pain med because \"he did not get that shaky feeling\".  Explained to pt that the med was scanned and given.  Informed pt that he could have his oral pain pill 1 hour after the IV dose.    1515  Pt's sister called the charge nurse Arlette.  Sister states that the pt was not getting the sensation from IV pain med as prior doses.  Charge nurse explained pain meds to sister.  Charge nurse also spoke with pt.  " No...

## (undated) DEVICE — PREMIUM WET SKIN PREP TRAY: Brand: MEDLINE INDUSTRIES, INC.

## (undated) DEVICE — CATH DIAG IMPULSE FR4 5F 100CM

## (undated) DEVICE — ELECTRD NDL EZ CLN STD 2.75IN

## (undated) DEVICE — SUT VIC 0 CT1 36IN J946H

## (undated) DEVICE — SUT MNCRYL PLS ANTIB UD 4/0 PS2 18IN

## (undated) DEVICE — CATH DIAG IMPULSE FL3.5 5F 100CM

## (undated) DEVICE — PK CATH CARD 40

## (undated) DEVICE — TR BAND RADIAL ARTERY COMPRESSION DEVICE: Brand: TR BAND

## (undated) DEVICE — GLIDESHEATH SLENDER STAINLESS STEEL KIT: Brand: GLIDESHEATH SLENDER

## (undated) DEVICE — SUT GUT CHRM 3/0 PS2 27IN 1638H

## (undated) DEVICE — GLV SURG BIOGEL LTX PF 7

## (undated) DEVICE — SUT SILK 3/0 TIES 18IN A184H

## (undated) DEVICE — SUT SILK 2/0 SH 30IN K833H

## (undated) DEVICE — SUT VIC 3/0 PS2 27IN J427H

## (undated) DEVICE — DRAPE,REIN 53X77,STERILE: Brand: MEDLINE

## (undated) DEVICE — BANDAGE,GAUZE,BULKEE II,4.5"X4.1YD,STRL: Brand: MEDLINE

## (undated) DEVICE — PANTY KNIT WASHABLE LG/XL BRN/GRN LF

## (undated) DEVICE — KT MANIFLD CARDIAC

## (undated) DEVICE — SUT SILK 2/0 SUTUPAK TIES 24IN SA75H

## (undated) DEVICE — GW EMR FIX EXCHG J STD .035 3MM 260CM

## (undated) DEVICE — PENCL ES MEGADINE EZ/CLEAN BUTN W/HOLSTR 10FT

## (undated) DEVICE — LOU MINOR PROCEDURE: Brand: MEDLINE INDUSTRIES, INC.

## (undated) DEVICE — ADHS SKIN SURG TISS VISC PREMIERPRO EXOFIN HI/VISC FAST/DRY

## (undated) DEVICE — CATH VENT MIV RADL PIG ST TIP 5F 110CM

## (undated) DEVICE — SUT SILK 0 SH 30IN K834H